# Patient Record
Sex: FEMALE | Race: WHITE | NOT HISPANIC OR LATINO | URBAN - METROPOLITAN AREA
[De-identification: names, ages, dates, MRNs, and addresses within clinical notes are randomized per-mention and may not be internally consistent; named-entity substitution may affect disease eponyms.]

---

## 2023-08-14 ENCOUNTER — INPATIENT (INPATIENT)
Facility: HOSPITAL | Age: 83
LOS: 3 days | Discharge: HOME HEALTH | DRG: 177 | End: 2023-08-18
Attending: STUDENT IN AN ORGANIZED HEALTH CARE EDUCATION/TRAINING PROGRAM | Admitting: STUDENT IN AN ORGANIZED HEALTH CARE EDUCATION/TRAINING PROGRAM
Payer: MEDICARE

## 2023-08-14 VITALS
SYSTOLIC BLOOD PRESSURE: 94 MMHG | OXYGEN SATURATION: 86 % | RESPIRATION RATE: 19 BRPM | TEMPERATURE: 98 F | DIASTOLIC BLOOD PRESSURE: 69 MMHG | WEIGHT: 125 LBS | HEIGHT: 60 IN | HEART RATE: 100 BPM

## 2023-08-14 LAB
ALBUMIN SERPL ELPH-MCNC: 3.3 G/DL — LOW (ref 3.4–5)
ALP SERPL-CCNC: 51 U/L — SIGNIFICANT CHANGE UP (ref 40–120)
ALT FLD-CCNC: 14 U/L — SIGNIFICANT CHANGE UP (ref 12–42)
ANION GAP SERPL CALC-SCNC: 13 MMOL/L — SIGNIFICANT CHANGE UP (ref 9–16)
APPEARANCE UR: ABNORMAL
APTT BLD: 34 SEC — SIGNIFICANT CHANGE UP (ref 24.5–35.6)
AST SERPL-CCNC: 22 U/L — SIGNIFICANT CHANGE UP (ref 15–37)
BASOPHILS # BLD AUTO: 0.02 K/UL — SIGNIFICANT CHANGE UP (ref 0–0.2)
BASOPHILS NFR BLD AUTO: 0.4 % — SIGNIFICANT CHANGE UP (ref 0–2)
BILIRUB SERPL-MCNC: 0.3 MG/DL — SIGNIFICANT CHANGE UP (ref 0.2–1.2)
BILIRUB UR-MCNC: NEGATIVE — SIGNIFICANT CHANGE UP
BUN SERPL-MCNC: 14 MG/DL — SIGNIFICANT CHANGE UP (ref 7–23)
CALCIUM SERPL-MCNC: 9.3 MG/DL — SIGNIFICANT CHANGE UP (ref 8.5–10.5)
CHLORIDE SERPL-SCNC: 101 MMOL/L — SIGNIFICANT CHANGE UP (ref 96–108)
CO2 SERPL-SCNC: 22 MMOL/L — SIGNIFICANT CHANGE UP (ref 22–31)
COLOR SPEC: YELLOW — SIGNIFICANT CHANGE UP
CREAT SERPL-MCNC: 1.1 MG/DL — SIGNIFICANT CHANGE UP (ref 0.5–1.3)
DIFF PNL FLD: ABNORMAL
EGFR: 50 ML/MIN/1.73M2 — LOW
EOSINOPHIL # BLD AUTO: 0 K/UL — SIGNIFICANT CHANGE UP (ref 0–0.5)
EOSINOPHIL NFR BLD AUTO: 0 % — SIGNIFICANT CHANGE UP (ref 0–6)
GLUCOSE SERPL-MCNC: 114 MG/DL — HIGH (ref 70–99)
GLUCOSE UR QL: NEGATIVE MG/DL — SIGNIFICANT CHANGE UP
HCT VFR BLD CALC: 34.3 % — LOW (ref 34.5–45)
HGB BLD-MCNC: 9.7 G/DL — LOW (ref 11.5–15.5)
IMM GRANULOCYTES NFR BLD AUTO: 0.6 % — SIGNIFICANT CHANGE UP (ref 0–0.9)
INR BLD: 1.14 — SIGNIFICANT CHANGE UP (ref 0.85–1.18)
KETONES UR-MCNC: ABNORMAL MG/DL
LACTATE BLDV-MCNC: 1.1 MMOL/L — SIGNIFICANT CHANGE UP (ref 0.5–2)
LACTATE BLDV-MCNC: 2.1 MMOL/L — HIGH (ref 0.5–2)
LEUKOCYTE ESTERASE UR-ACNC: ABNORMAL
LYMPHOCYTES # BLD AUTO: 0.49 K/UL — LOW (ref 1–3.3)
LYMPHOCYTES # BLD AUTO: 9.8 % — LOW (ref 13–44)
MCHC RBC-ENTMCNC: 21 PG — LOW (ref 27–34)
MCHC RBC-ENTMCNC: 28.3 GM/DL — LOW (ref 32–36)
MCV RBC AUTO: 74.1 FL — LOW (ref 80–100)
MONOCYTES # BLD AUTO: 0.77 K/UL — SIGNIFICANT CHANGE UP (ref 0–0.9)
MONOCYTES NFR BLD AUTO: 15.4 % — HIGH (ref 2–14)
NEUTROPHILS # BLD AUTO: 3.68 K/UL — SIGNIFICANT CHANGE UP (ref 1.8–7.4)
NEUTROPHILS NFR BLD AUTO: 73.8 % — SIGNIFICANT CHANGE UP (ref 43–77)
NITRITE UR-MCNC: NEGATIVE — SIGNIFICANT CHANGE UP
NRBC # BLD: 0 /100 WBCS — SIGNIFICANT CHANGE UP (ref 0–0)
NT-PROBNP SERPL-SCNC: 525 PG/ML — HIGH
PCO2 BLDV: 42 MMHG — SIGNIFICANT CHANGE UP (ref 39–42)
PH BLDV: 7.36 — SIGNIFICANT CHANGE UP (ref 7.32–7.43)
PH UR: 6 — SIGNIFICANT CHANGE UP (ref 5–8)
PLATELET # BLD AUTO: 291 K/UL — SIGNIFICANT CHANGE UP (ref 150–400)
PO2 BLDV: <35 MMHG — SIGNIFICANT CHANGE UP (ref 25–45)
POTASSIUM SERPL-MCNC: 3.4 MMOL/L — LOW (ref 3.5–5.3)
POTASSIUM SERPL-SCNC: 3.4 MMOL/L — LOW (ref 3.5–5.3)
PROT SERPL-MCNC: 7.9 G/DL — SIGNIFICANT CHANGE UP (ref 6.4–8.2)
PROT UR-MCNC: 30 MG/DL
PROTHROM AB SERPL-ACNC: 12.5 SEC — SIGNIFICANT CHANGE UP (ref 9.5–13)
RBC # BLD: 4.63 M/UL — SIGNIFICANT CHANGE UP (ref 3.8–5.2)
RBC # FLD: 17.3 % — HIGH (ref 10.3–14.5)
SAO2 % BLDV: 48.6 % — LOW (ref 67–88)
SARS-COV-2 RNA SPEC QL NAA+PROBE: DETECTED
SODIUM SERPL-SCNC: 136 MMOL/L — SIGNIFICANT CHANGE UP (ref 132–145)
SP GR SPEC: 1.01 — SIGNIFICANT CHANGE UP (ref 1–1.03)
TROPONIN I, HIGH SENSITIVITY RESULT: 10.3 NG/L — SIGNIFICANT CHANGE UP
UROBILINOGEN FLD QL: 1 MG/DL — SIGNIFICANT CHANGE UP (ref 0.2–1)
WBC # BLD: 4.99 K/UL — SIGNIFICANT CHANGE UP (ref 3.8–10.5)
WBC # FLD AUTO: 4.99 K/UL — SIGNIFICANT CHANGE UP (ref 3.8–10.5)

## 2023-08-14 PROCEDURE — 99285 EMERGENCY DEPT VISIT HI MDM: CPT

## 2023-08-14 PROCEDURE — 71045 X-RAY EXAM CHEST 1 VIEW: CPT | Mod: 26

## 2023-08-14 RX ORDER — DEXAMETHASONE 0.5 MG/5ML
6 ELIXIR ORAL ONCE
Refills: 0 | Status: COMPLETED | OUTPATIENT
Start: 2023-08-14 | End: 2023-08-14

## 2023-08-14 RX ORDER — ACETAMINOPHEN 500 MG
975 TABLET ORAL ONCE
Refills: 0 | Status: COMPLETED | OUTPATIENT
Start: 2023-08-14 | End: 2023-08-14

## 2023-08-14 RX ORDER — SODIUM CHLORIDE 9 MG/ML
1750 INJECTION INTRAMUSCULAR; INTRAVENOUS; SUBCUTANEOUS ONCE
Refills: 0 | Status: COMPLETED | OUTPATIENT
Start: 2023-08-14 | End: 2023-08-14

## 2023-08-14 RX ADMIN — SODIUM CHLORIDE 1750 MILLILITER(S): 9 INJECTION INTRAMUSCULAR; INTRAVENOUS; SUBCUTANEOUS at 19:59

## 2023-08-14 RX ADMIN — Medication 975 MILLIGRAM(S): at 19:59

## 2023-08-14 RX ADMIN — Medication 100 MILLIGRAM(S): at 20:32

## 2023-08-14 RX ADMIN — Medication 6 MILLIGRAM(S): at 22:24

## 2023-08-14 NOTE — ED ADULT NURSE REASSESSMENT NOTE - NS ED NURSE REASSESS COMMENT FT1
While at the bedside assisting Pt to bedside commode I observed cardiac monitor showing -170 lasting appx 30-45 seconds, then againg -190 lasting appx 30-45 seconds resolving to SR 86-90. MD made aware and at the bedside. Pt denies any complaints during episodes.

## 2023-08-14 NOTE — ED PROVIDER NOTE - OBJECTIVE STATEMENT
81 y/o F w/hx CVA (residual gait disturbance and LLE weakness, ambulates with walker), HTN, HLD, hypothyroidism, p/w cough, severe fatigue/malaise and poor appetite/reduced PO intake for 3 days with night sweats, no documented fever. Cough is productive with some thick sputum. Denies CP/SOB. No abd pain. Normal stooling. No urinary sx. + home COVID test today and + known contact of son. Pt is unclear whether she's been vaccinated against COVID but has had COVID 1x prior, which pt states was a mild case and did not require hospitalization. No hx lung disease, inhaler use, smoking, or prior O2 requirement. Denies LE pain/swelling.

## 2023-08-14 NOTE — ED PROVIDER NOTE - CARE PLAN
1 Principal Discharge DX:	Acute respiratory failure, unspecified whether with hypoxia or hypercapnia  Secondary Diagnosis:	2019 novel coronavirus disease (COVID-19)   Principal Discharge DX:	Acute respiratory failure, unspecified whether with hypoxia or hypercapnia  Secondary Diagnosis:	2019 novel coronavirus disease (COVID-19)  Secondary Diagnosis:	Supraventricular arrhythmia

## 2023-08-14 NOTE — ED PROVIDER NOTE - CLINICAL SUMMARY MEDICAL DECISION MAKING FREE TEXT BOX
+ COVID w/hypoxic respiratory failure, responding well to 3L O2 via NC, not working for breath, lactate downtrending, admitting to [facility] at [facility]. + COVID w/hypoxic respiratory failure, responding well to 3L O2 via NC, not working for breath, lactate downtrending, admitting to RM[facility] at [facility].    ** After admission to [facility], pt developed a tachy narrow-complex arrhythmia with a rate in the 170s for approximately 1 minute that broke with pt bearing down. Unable to capture on EKG. Repeat EKG shows pt back in normal sinus rhythm. Pt has no known hx of arrhythmia or cardiovascular disease beyond HTN. However, pt does indicate that from time to time she will get a strange chest discomfort (often at rest) with a feeling of lightheadedness that will last for 1-2min for approximately the last 6 months. Discussed with both hospitalist and intensivist at [facility], dispo changed to med-tele.

## 2023-08-14 NOTE — ED ADULT NURSE NOTE - NSFALLRISKINTERV_ED_ALL_ED

## 2023-08-14 NOTE — ED ADULT NURSE NOTE - OBJECTIVE STATEMENT
Pt with at home covid test, reports decreased PO intake x3-4 days and inability to take home meds. Pt with productive cough and SOB. Pt placed on 2L NC for hypoxia, reports this is her second time with covid and denies any vaccinations. Placed on all monitors.

## 2023-08-14 NOTE — ED PROVIDER NOTE - PHYSICAL EXAMINATION
VITAL SIGNS: I have reviewed nursing notes and confirm.  CONSTITUTIONAL: thin elderly female lying calmly in stretcher on 3L O2 via NC, speaking clearly in complete sentences, in no acute distress.  SKIN: Skin exam is warm and dry, no acute rash.  HEAD: Normocephalic; atraumatic.  EYES: PERRL, EOM intact; conjunctiva and sclera clear.  ENT: No nasal discharge; airway clear.  NECK: Supple; non tender.  CARD: S1, S2 normal; no murmurs, gallops, or rubs. Regular rate and rhythm.  RESP: Unlabored. + scattered rhonchi that clear with cough, no wheezing, good excursion  ABD: soft; non-distended; non-tender  EXT: Normal ROM. No cyanosis or edema. Non-ttp all ext, distal pulses intact  NEURO: Alert, oriented. Grossly unremarkable.  PSYCH: Cooperative, appropriate.

## 2023-08-15 DIAGNOSIS — D50.9 IRON DEFICIENCY ANEMIA, UNSPECIFIED: ICD-10-CM

## 2023-08-15 DIAGNOSIS — J96.01 ACUTE RESPIRATORY FAILURE WITH HYPOXIA: ICD-10-CM

## 2023-08-15 DIAGNOSIS — U07.1 COVID-19: ICD-10-CM

## 2023-08-15 DIAGNOSIS — I47.1 SUPRAVENTRICULAR TACHYCARDIA: ICD-10-CM

## 2023-08-15 DIAGNOSIS — R82.71 BACTERIURIA: ICD-10-CM

## 2023-08-15 DIAGNOSIS — I10 ESSENTIAL (PRIMARY) HYPERTENSION: ICD-10-CM

## 2023-08-15 DIAGNOSIS — E03.9 HYPOTHYROIDISM, UNSPECIFIED: ICD-10-CM

## 2023-08-15 DIAGNOSIS — N39.0 URINARY TRACT INFECTION, SITE NOT SPECIFIED: ICD-10-CM

## 2023-08-15 DIAGNOSIS — E78.5 HYPERLIPIDEMIA, UNSPECIFIED: ICD-10-CM

## 2023-08-15 DIAGNOSIS — Z98.890 OTHER SPECIFIED POSTPROCEDURAL STATES: Chronic | ICD-10-CM

## 2023-08-15 DIAGNOSIS — Z29.9 ENCOUNTER FOR PROPHYLACTIC MEASURES, UNSPECIFIED: ICD-10-CM

## 2023-08-15 DIAGNOSIS — I63.9 CEREBRAL INFARCTION, UNSPECIFIED: ICD-10-CM

## 2023-08-15 DIAGNOSIS — Z90.710 ACQUIRED ABSENCE OF BOTH CERVIX AND UTERUS: Chronic | ICD-10-CM

## 2023-08-15 DIAGNOSIS — R10.9 UNSPECIFIED ABDOMINAL PAIN: ICD-10-CM

## 2023-08-15 DIAGNOSIS — M81.0 AGE-RELATED OSTEOPOROSIS WITHOUT CURRENT PATHOLOGICAL FRACTURE: ICD-10-CM

## 2023-08-15 LAB
ALBUMIN SERPL ELPH-MCNC: 3.3 G/DL — SIGNIFICANT CHANGE UP (ref 3.3–5)
ALP SERPL-CCNC: 50 U/L — SIGNIFICANT CHANGE UP (ref 40–120)
ALT FLD-CCNC: 9 U/L — LOW (ref 10–45)
ANION GAP SERPL CALC-SCNC: 10 MMOL/L — SIGNIFICANT CHANGE UP (ref 5–17)
AST SERPL-CCNC: 14 U/L — SIGNIFICANT CHANGE UP (ref 10–40)
BILIRUB SERPL-MCNC: 0.2 MG/DL — SIGNIFICANT CHANGE UP (ref 0.2–1.2)
BUN SERPL-MCNC: 18 MG/DL — SIGNIFICANT CHANGE UP (ref 7–23)
CALCIUM SERPL-MCNC: 9.2 MG/DL — SIGNIFICANT CHANGE UP (ref 8.4–10.5)
CHLORIDE SERPL-SCNC: 109 MMOL/L — HIGH (ref 96–108)
CO2 SERPL-SCNC: 21 MMOL/L — LOW (ref 22–31)
CREAT SERPL-MCNC: 0.61 MG/DL — SIGNIFICANT CHANGE UP (ref 0.5–1.3)
EGFR: 88 ML/MIN/1.73M2 — SIGNIFICANT CHANGE UP
FERRITIN SERPL-MCNC: 14 NG/ML — SIGNIFICANT CHANGE UP (ref 13–330)
GLUCOSE SERPL-MCNC: 127 MG/DL — HIGH (ref 70–99)
HCT VFR BLD CALC: 33.1 % — LOW (ref 34.5–45)
HGB BLD-MCNC: 9.5 G/DL — LOW (ref 11.5–15.5)
IRON SATN MFR SERPL: 17 UG/DL — LOW (ref 30–160)
IRON SATN MFR SERPL: 6 % — LOW (ref 14–50)
MAGNESIUM SERPL-MCNC: 2 MG/DL — SIGNIFICANT CHANGE UP (ref 1.6–2.6)
MCHC RBC-ENTMCNC: 21.4 PG — LOW (ref 27–34)
MCHC RBC-ENTMCNC: 28.7 GM/DL — LOW (ref 32–36)
MCV RBC AUTO: 74.7 FL — LOW (ref 80–100)
NRBC # BLD: 0 /100 WBCS — SIGNIFICANT CHANGE UP (ref 0–0)
PHOSPHATE SERPL-MCNC: 3 MG/DL — SIGNIFICANT CHANGE UP (ref 2.5–4.5)
PLATELET # BLD AUTO: 291 K/UL — SIGNIFICANT CHANGE UP (ref 150–400)
POTASSIUM SERPL-MCNC: 3.3 MMOL/L — LOW (ref 3.5–5.3)
POTASSIUM SERPL-SCNC: 3.3 MMOL/L — LOW (ref 3.5–5.3)
PROT SERPL-MCNC: 6.3 G/DL — SIGNIFICANT CHANGE UP (ref 6–8.3)
RBC # BLD: 4.43 M/UL — SIGNIFICANT CHANGE UP (ref 3.8–5.2)
RBC # FLD: 17.3 % — HIGH (ref 10.3–14.5)
SODIUM SERPL-SCNC: 140 MMOL/L — SIGNIFICANT CHANGE UP (ref 135–145)
TIBC SERPL-MCNC: 272 UG/DL — SIGNIFICANT CHANGE UP (ref 220–430)
TRANSFERRIN SERPL-MCNC: 225 MG/DL — SIGNIFICANT CHANGE UP (ref 200–360)
UIBC SERPL-MCNC: 255 UG/DL — SIGNIFICANT CHANGE UP (ref 110–370)
WBC # BLD: 3.64 K/UL — LOW (ref 3.8–10.5)
WBC # FLD AUTO: 3.64 K/UL — LOW (ref 3.8–10.5)

## 2023-08-15 PROCEDURE — 99233 SBSQ HOSP IP/OBS HIGH 50: CPT | Mod: GC

## 2023-08-15 RX ORDER — ENOXAPARIN SODIUM 100 MG/ML
40 INJECTION SUBCUTANEOUS EVERY 24 HOURS
Refills: 0 | Status: DISCONTINUED | OUTPATIENT
Start: 2023-08-15 | End: 2023-08-18

## 2023-08-15 RX ORDER — ASPIRIN/CALCIUM CARB/MAGNESIUM 324 MG
81 TABLET ORAL DAILY
Refills: 0 | Status: DISCONTINUED | OUTPATIENT
Start: 2023-08-15 | End: 2023-08-18

## 2023-08-15 RX ORDER — LISINOPRIL 2.5 MG/1
1 TABLET ORAL
Refills: 0 | DISCHARGE

## 2023-08-15 RX ORDER — LEVOTHYROXINE SODIUM 125 MCG
88 TABLET ORAL EVERY 24 HOURS
Refills: 0 | Status: DISCONTINUED | OUTPATIENT
Start: 2023-08-15 | End: 2023-08-18

## 2023-08-15 RX ORDER — SODIUM CHLORIDE 9 MG/ML
1000 INJECTION, SOLUTION INTRAVENOUS
Refills: 0 | Status: DISCONTINUED | OUTPATIENT
Start: 2023-08-15 | End: 2023-08-18

## 2023-08-15 RX ORDER — FAMOTIDINE 10 MG/ML
1 INJECTION INTRAVENOUS
Refills: 0 | DISCHARGE

## 2023-08-15 RX ORDER — CEFTRIAXONE 500 MG/1
1000 INJECTION, POWDER, FOR SOLUTION INTRAMUSCULAR; INTRAVENOUS EVERY 24 HOURS
Refills: 0 | Status: COMPLETED | OUTPATIENT
Start: 2023-08-15 | End: 2023-08-17

## 2023-08-15 RX ORDER — RALOXIFENE HYDROCHLORIDE 60 MG/1
1 TABLET, COATED ORAL
Refills: 0 | DISCHARGE

## 2023-08-15 RX ORDER — LISINOPRIL 2.5 MG/1
5 TABLET ORAL DAILY
Refills: 0 | Status: DISCONTINUED | OUTPATIENT
Start: 2023-08-15 | End: 2023-08-15

## 2023-08-15 RX ORDER — DEXTROSE 50 % IN WATER 50 %
15 SYRINGE (ML) INTRAVENOUS ONCE
Refills: 0 | Status: DISCONTINUED | OUTPATIENT
Start: 2023-08-15 | End: 2023-08-18

## 2023-08-15 RX ORDER — ROSUVASTATIN CALCIUM 5 MG/1
1 TABLET ORAL
Refills: 0 | DISCHARGE

## 2023-08-15 RX ORDER — DEXTROSE 50 % IN WATER 50 %
25 SYRINGE (ML) INTRAVENOUS ONCE
Refills: 0 | Status: DISCONTINUED | OUTPATIENT
Start: 2023-08-15 | End: 2023-08-18

## 2023-08-15 RX ORDER — AMITRIPTYLINE HCL 25 MG
1 TABLET ORAL
Refills: 0 | DISCHARGE

## 2023-08-15 RX ORDER — LISINOPRIL 2.5 MG/1
5 TABLET ORAL EVERY 24 HOURS
Refills: 0 | Status: DISCONTINUED | OUTPATIENT
Start: 2023-08-15 | End: 2023-08-18

## 2023-08-15 RX ORDER — FAMOTIDINE 10 MG/ML
20 INJECTION INTRAVENOUS DAILY
Refills: 0 | Status: DISCONTINUED | OUTPATIENT
Start: 2023-08-15 | End: 2023-08-18

## 2023-08-15 RX ORDER — GLUCAGON INJECTION, SOLUTION 0.5 MG/.1ML
1 INJECTION, SOLUTION SUBCUTANEOUS ONCE
Refills: 0 | Status: DISCONTINUED | OUTPATIENT
Start: 2023-08-15 | End: 2023-08-18

## 2023-08-15 RX ORDER — DEXAMETHASONE 0.5 MG/5ML
6 ELIXIR ORAL DAILY
Refills: 0 | Status: DISCONTINUED | OUTPATIENT
Start: 2023-08-15 | End: 2023-08-17

## 2023-08-15 RX ORDER — ASPIRIN/CALCIUM CARB/MAGNESIUM 324 MG
1 TABLET ORAL
Refills: 0 | DISCHARGE

## 2023-08-15 RX ORDER — DEXTROSE 50 % IN WATER 50 %
12.5 SYRINGE (ML) INTRAVENOUS ONCE
Refills: 0 | Status: DISCONTINUED | OUTPATIENT
Start: 2023-08-15 | End: 2023-08-18

## 2023-08-15 RX ORDER — REMDESIVIR 5 MG/ML
100 INJECTION INTRAVENOUS EVERY 24 HOURS
Refills: 0 | Status: DISCONTINUED | OUTPATIENT
Start: 2023-08-16 | End: 2023-08-18

## 2023-08-15 RX ORDER — INSULIN LISPRO 100/ML
VIAL (ML) SUBCUTANEOUS
Refills: 0 | Status: DISCONTINUED | OUTPATIENT
Start: 2023-08-15 | End: 2023-08-18

## 2023-08-15 RX ORDER — LEVOTHYROXINE SODIUM 125 MCG
1 TABLET ORAL
Refills: 0 | DISCHARGE

## 2023-08-15 RX ORDER — REMDESIVIR 5 MG/ML
200 INJECTION INTRAVENOUS EVERY 24 HOURS
Refills: 0 | Status: COMPLETED | OUTPATIENT
Start: 2023-08-15 | End: 2023-08-15

## 2023-08-15 RX ORDER — ATORVASTATIN CALCIUM 80 MG/1
20 TABLET, FILM COATED ORAL AT BEDTIME
Refills: 0 | Status: DISCONTINUED | OUTPATIENT
Start: 2023-08-15 | End: 2023-08-18

## 2023-08-15 RX ORDER — REMDESIVIR 5 MG/ML
INJECTION INTRAVENOUS
Refills: 0 | Status: DISCONTINUED | OUTPATIENT
Start: 2023-08-16 | End: 2023-08-18

## 2023-08-15 RX ADMIN — CEFTRIAXONE 100 MILLIGRAM(S): 500 INJECTION, POWDER, FOR SOLUTION INTRAMUSCULAR; INTRAVENOUS at 18:57

## 2023-08-15 RX ADMIN — Medication 88 MICROGRAM(S): at 10:26

## 2023-08-15 RX ADMIN — ENOXAPARIN SODIUM 40 MILLIGRAM(S): 100 INJECTION SUBCUTANEOUS at 10:27

## 2023-08-15 RX ADMIN — FAMOTIDINE 20 MILLIGRAM(S): 10 INJECTION INTRAVENOUS at 10:26

## 2023-08-15 RX ADMIN — LISINOPRIL 5 MILLIGRAM(S): 2.5 TABLET ORAL at 10:26

## 2023-08-15 RX ADMIN — REMDESIVIR 200 MILLIGRAM(S): 5 INJECTION INTRAVENOUS at 17:17

## 2023-08-15 RX ADMIN — ATORVASTATIN CALCIUM 20 MILLIGRAM(S): 80 TABLET, FILM COATED ORAL at 21:05

## 2023-08-15 RX ADMIN — Medication 81 MILLIGRAM(S): at 10:26

## 2023-08-15 RX ADMIN — Medication 6 MILLIGRAM(S): at 21:03

## 2023-08-15 NOTE — H&P ADULT - HISTORY OF PRESENT ILLNESS
HPI:    Pt is a 81 y/o F w/hx CVA (residual gait disturbance and LLE weakness, ambulates with walker), HTN, HLD, hypothyroidism, p/w cough, severe fatigue/malaise and poor appetite/reduced PO intake for 3 days with night sweats, no documented fever. Cough is productive with some thick sputum. Denies CP/SOB. No abd pain. Normal stooling. No urinary sx. + home COVID test today and + known contact of son. Pt is unclear whether she's been vaccinated against COVID but has had COVID 1x prior, which pt states was a mild case and did not require hospitalization. No hx lung disease, inhaler use, smoking, or prior O2 requirement. Denies LE pain/swelling.  While in the ED, pt developed a tachy narrow-complex arrhythmia with a rate in the 170s for approximately 1 minute that broke with pt bearing down. Unable to capture on EKG. Repeat EKG shows pt back in normal sinus rhythm. Pt has no known hx of arrhythmia or cardiovascular disease beyond HTN. However, pt does indicate that from time to time she will get a strange chest discomfort (often at rest) with a feeling of lightheadedness that will last for 1-2min for approximately the last 6 months.    In the ED:  vitals: T 98.4, , BP 94/69, RR 19, O2 86% on RA  labs: Pro-, lactate 2.1  UA positive  COVID positive  Imaging: EKG: tachy narrow complex arrhythmia with rate in the 170s for approximately 1 minute that broke with pt doing valsava manuver. Repeat EKG showing NSR  CXR: Left basilar focal atelectasis  Intervention: Acetaminophen 975mg, tessalon perle 100mg, dexamethasone 6mg, 1.75L NS     HPI:    Pt is a 81 y/o F w/hx CVA (2015 w/ residual gait disturbance and LLE weakness, ambulates with walker), HTN, HLD, hypothyroidism, p/w cough, severe fatigue/malaise and poor appetite/reduced PO intake for 3 days with night sweats, no documented fever. Cough is productive with some thick sputum. Denies CP/SOB. No abd pain. Normal stooling. No urinary sx. + home COVID test today and + known contact of son. Pt is unclear whether she's been vaccinated against COVID but has had COVID 1x prior, which pt states was a mild case and did not require hospitalization. No hx lung disease, inhaler use, smoking, or prior O2 requirement. Denies LE pain/swelling.  While in the ED, pt developed a tachy narrow-complex arrhythmia with a rate in the 170s for approximately 1 minute that broke with pt bearing down. Unable to capture on EKG. Repeat EKG shows pt back in normal sinus rhythm. Pt has no known hx of arrhythmia or cardiovascular disease beyond HTN. However, pt does indicate that from time to time she will get a strange chest discomfort (often at rest) with a feeling of lightheadedness that will last for 1-2min for approximately the last 6 months.    In the ED:  vitals: T 98.4, , BP 94/69, RR 19, O2 86% on RA  labs: Pro-, lactate 2.1  UA positive  COVID positive  Imaging: EKG: tachy narrow complex arrhythmia with rate in the 170s for approximately 1 minute that broke with pt doing valsava manuver. Repeat EKG showing NSR  CXR: Left basilar focal atelectasis  Intervention: Acetaminophen 975mg, tessalon perle 100mg, dexamethasone 6mg, 1.75L NS

## 2023-08-15 NOTE — PROGRESS NOTE ADULT - PROBLEM SELECTOR PLAN 9
H/o hypothyroidism. Home meds: Levothyroxine 88mg qd  - c/w home med H/o hypothyroidism. Home meds: Levothyroxine 88mg qd    Plan  - c/w home med

## 2023-08-15 NOTE — PROGRESS NOTE ADULT - PROBLEM SELECTOR PLAN 7
h/o HTN. Home meds: lisinopril 5mg qd  - c/w home med h/o HTN. Home meds: lisinopril 5mg qd    Plan  - c/w home med

## 2023-08-15 NOTE — H&P ADULT - PROBLEM SELECTOR PLAN 11
Fluids: s/p 1.75 L  Electrolytes: Mg >2, K >4  Nutrition: consistent carb diet  Prophylaxis: lovenox  Activity: fall risk  GI: none  C: FC  Dispo: 7lach

## 2023-08-15 NOTE — PROCEDURE NOTE - NSINFORMCONSENT_GEN_A_CORE
Benefits, risks, and possible complications of procedure explained to patient/caregiver who verbalized understanding and gave verbal consent. Niacinamide Counseling: I recommended taking niacin or niacinamide, also know as vitamin B3, twice daily. Recent evidence suggests that taking vitamin B3 (500 mg twice daily) can reduce the risk of actinic keratoses and non-melanoma skin cancers. Side effects of vitamin B3 include flushing and headache.

## 2023-08-15 NOTE — H&P ADULT - PROBLEM SELECTOR PLAN 4
Hgb 9.7 on admission. unknown baseline.  - f/u iron panel  - transfuse if <7  - active T&S UA positive, leukocyte esterase, WBC, and bacteria. Pt asymptomatic,  denies dysuria or increase in urinary frequency.  - continue to monitor

## 2023-08-15 NOTE — H&P ADULT - PROBLEM SELECTOR PLAN 10
Fluids: s/p 1.75 L  Electrolytes: Mg >2, K >4  Nutrition:   Prophylaxis: lovenox  Activity:  GI: none  C:   Dispo: Pt has h/o six abdominal surgeries including hysterectomy. She reports chronic pain for which she takes Amitriptyline 25mg  - f/u EKG and consider restarting if QTc normal and pt has abdominal pain

## 2023-08-15 NOTE — H&P ADULT - PROBLEM SELECTOR PLAN 5
UA positive, leukocyte esterase, WBC, and bacteria. Pt c/o UA positive, leukocyte esterase, WBC, and bacteria. Pt asymptomatic,  denies dysuria or increase in urinary frequency.  - continue to monitor Hgb 9.7 on admission. unknown baseline.  - f/u iron panel  - transfuse if <7  - active T&S

## 2023-08-15 NOTE — PHYSICAL THERAPY INITIAL EVALUATION ADULT - ADDITIONAL COMMENTS
As per pt, PTA she was mostly independent with functional mobility, ADLs, and IADLs with use of rollator. Pt lives with son and he cooks/cleans for her. Son accompanies pt to all appointments. Pt was previously doing OPPT for LLE residual CVA deficits (CVA 6 years ago). Has a shower tub with a shower chair and grab bars.

## 2023-08-15 NOTE — PROGRESS NOTE ADULT - PROBLEM SELECTOR PLAN 5
gb 9.7 on admission. Unknown baseline.  -    Plan  - f/u iron panel  - transfuse if <7  - active T&S Hgb 9.7 on admission. Unknown baseline.    Plan  - f/u iron panel- likely DEANNA   - transfuse if <7  - active T&S

## 2023-08-15 NOTE — H&P ADULT - PROBLEM SELECTOR PLAN 6
Pt with residual gait disturbance and LLE weakness, ambulates with walker. Home meds:  - c/w home med Pt with residual gait disturbance and LLE weakness, ambulates with walker. Home meds: Rosuvastain 20 and aspirin 81  - c/w home med

## 2023-08-15 NOTE — PROGRESS NOTE ADULT - PROBLEM SELECTOR PLAN 10
Pt has h/o six abdominal surgeries including hysterectomy. She reports chronic pain for which she takes Amitriptyline 25mg  - f/u EKG and consider restarting if QTc normal and pt has abdominal pain Pt has h/o six abdominal surgeries including hysterectomy. She reports chronic pain for which she takes Amitriptyline 25mg  Pt. currently not complaining of any abdominal pain     Plan  - f/u EKG and consider restarting if QTc normal and pt has abdominal pain

## 2023-08-15 NOTE — H&P ADULT - NSHPPHYSICALEXAM_GEN_ALL_CORE
GENERAL: NAD, lying in bed comfortably  HEAD:  Atraumatic, normocephalic  EYES: EOMI, PERRLA, conjunctiva and sclera clear  NECK: Supple, trachea midline, no JVD  HEART: Regular rate and rhythm, no murmurs, rubs, or gallops  LUNGS: Unlabored respirations.  Clear to auscultation bilaterally, no crackles, wheezing, or rhonchi  ABDOMEN: Soft, nontender, nondistended, +BS  EXTREMITIES: 2+ peripheral pulses bilaterally. No clubbing, cyanosis, or edema  NERVOUS SYSTEM:  A&Ox3, moving all extremities, no focal deficits   SKIN: No rashes or lesions

## 2023-08-15 NOTE — H&P ADULT - NSHPLABSRESULTS_GEN_ALL_CORE
LABS:                         9.7    4.99  )-----------( 291      ( 14 Aug 2023 19:38 )             34.3     08-14    136  |  101  |  14  ----------------------------<  114<H>  3.4<L>   |  22  |  1.10    Ca    9.3      14 Aug 2023 19:38    TPro  7.9  /  Alb  3.3<L>  /  TBili  0.3  /  DBili  x   /  AST  22  /  ALT  14  /  AlkPhos  51  08-14    PT/INR - ( 14 Aug 2023 19:38 )   PT: 12.5 sec;   INR: 1.14          PTT - ( 14 Aug 2023 19:38 )  PTT:34.0 sec  Urinalysis Basic - ( 14 Aug 2023 19:38 )    Color: Yellow / Appearance: Cloudy / S.010 / pH: x  Gluc: 114 mg/dL / Ketone: Trace mg/dL  / Bili: Negative / Urobili: 1.0 mg/dL   Blood: x / Protein: 30 mg/dL / Nitrite: Negative   Leuk Esterase: Large / RBC: 3 /HPF / WBC >30 /HPF   Sq Epi: x / Non Sq Epi: x / Bacteria: Too Numerous to count /HPF                RADIOLOGY, EKG & ADDITIONAL TESTS: Reviewed.

## 2023-08-15 NOTE — PROGRESS NOTE ADULT - PROBLEM SELECTOR PLAN 3
EKG in ED showing tachy narrow complex arrhythmia with rate in the 170s for approximately 1 minute that broke with pt doing valsava manuver. Repeat EKG showing NSR.  Possibly 2/2 COVID infection and hypoxia     Plan   - f/u TTE  - consider EP consult if patient experiences arrhythmia again

## 2023-08-15 NOTE — H&P ADULT - ASSESSMENT
Pt is a 81 y/o F w/hx CVA (residual gait disturbance and LLE weakness, ambulates with walker), HTN, HLD, hypothyroidism, p/w cough, severe fatigue/malaise likely 2/2 to COVID infection. Pt also developed a tachy narrow complex arrhythmia at ED which broke with valsava maneuver. Pt being admitted to [facility] for closer monitoring of arrythmia and COVID infection.

## 2023-08-15 NOTE — PATIENT PROFILE ADULT - FUNCTIONAL ASSESSMENT - DAILY ACTIVITY 4.
3 = A little assistance
Comment: Improvement s/p 4 months on Accutane with no adverse s/e. Plan to continue at current dose.
Detail Level: Zone

## 2023-08-15 NOTE — PROGRESS NOTE ADULT - PROBLEM SELECTOR PLAN 1
Patient had a + home COVID test and known sick contact of her COVID + son. Pt is unsure if she ever got COVID vaccination   Pt. SpO2 86% on RA upon presentation to ED, tested positive for COVID  CXR: Left basilar focal atelectasis    Plan   - c/w Decadron 6mg   - c/w Remdesevir taper  - On 4L NC, wean as tolerated  - supportive care

## 2023-08-15 NOTE — PROGRESS NOTE ADULT - SUBJECTIVE AND OBJECTIVE BOX
**INCOMPLETE NOTE    OVERNIGHT EVENTS:    SUBJECTIVE:  Patient seen and examined at bedside.    Vital Signs Last 12 Hrs  T(F): 98.3 (08-15-23 @ 05:31), Max: 98.5 (08-15-23 @ 04:12)  HR: 70 (08-15-23 @ 05:31) (67 - 88)  BP: 151/66 (08-15-23 @ 05:31) (111/56 - 161/66)  BP(mean): 95 (08-15-23 @ 05:31) (95 - 95)  RR: 20 (08-15-23 @ 05:31) (16 - 20)  SpO2: 96% (08-15-23 @ 05:31) (93% - 96%)  I&O's Summary      PHYSICAL EXAM:  Constitutional: NAD, comfortable in bed.  HEENT: NC/AT, PERRLA, EOMI, no conjunctival pallor or scleral icterus, MMM  Neck: Supple, no JVD  Respiratory: CTA B/L. No w/r/r.   Cardiovascular: RRR, normal S1 and S2, no m/r/g.   Gastrointestinal: +BS, soft NTND, no guarding or rebound tenderness, no palpable masses   Extremities: wwp; no cyanosis, clubbing or edema.   Vascular: Pulses equal and strong throughout.   Neurological: AAOx3, no CN deficits, strength and sensation intact throughout.   Skin: No gross skin abnormalities or rashes        LABS:                        9.7    4.99  )-----------( 291      ( 14 Aug 2023 19:38 )             34.3     08-    136  |  101  |  14  ----------------------------<  114<H>  3.4<L>   |  22  |  1.10    Ca    9.3      14 Aug 2023 19:38    TPro  7.9  /  Alb  3.3<L>  /  TBili  0.3  /  DBili  x   /  AST  22  /  ALT  14  /  AlkPhos  51  08-14    PT/INR - ( 14 Aug 2023 19:38 )   PT: 12.5 sec;   INR: 1.14          PTT - ( 14 Aug 2023 19:38 )  PTT:34.0 sec  Urinalysis Basic - ( 14 Aug 2023 19:38 )    Color: Yellow / Appearance: Cloudy / S.010 / pH: x  Gluc: 114 mg/dL / Ketone: Trace mg/dL  / Bili: Negative / Urobili: 1.0 mg/dL   Blood: x / Protein: 30 mg/dL / Nitrite: Negative   Leuk Esterase: Large / RBC: 3 /HPF / WBC >30 /HPF   Sq Epi: x / Non Sq Epi: x / Bacteria: Too Numerous to count /HPF          RADIOLOGY & ADDITIONAL TESTS:    MEDICATIONS  (STANDING):  aspirin  chewable 81 milliGRAM(s) Oral daily  atorvastatin 20 milliGRAM(s) Oral at bedtime  dexAMETHasone     Tablet 6 milliGRAM(s) Oral daily  enoxaparin Injectable 40 milliGRAM(s) SubCutaneous every 24 hours  famotidine    Tablet 20 milliGRAM(s) Oral daily  levothyroxine 88 MICROGram(s) Oral every 24 hours  lisinopril 5 milliGRAM(s) Oral every 24 hours    MEDICATIONS  (PRN):   ****** TRANSFER FROM Located within [facility] TO [facility] ******    HOSPITAL COURSE:  Pt is a 83 y/o F w/hx CVA (residual gait disturbance and LLE weakness, ambulates with walker), HTN, HLD, hypothyroidism, p/w cough, severe fatigue/malaise and found to be COVID positive. Initial vitals vitals: T 98.4, , BP 94/69, RR 19, O2 86% on RA. CXR showed Left basilar focal atelectasis. Patient initially placed on NC 2L but increased to 4L NC.   Pt was admitted to [facility] for closer monitoring of arrythmia and COVID infection. She was started on Decadron and Remdesivir for severe COVID.     Course wa c/b Pt also developed a tachy narrow complex arrhythmia at ED which broke with valsava maneuver.      SUBJECTIVE:  Patient seen and examined at bedside.    Vital Signs Last 12 Hrs  T(F): 98.3 (08-15-23 @ 05:31), Max: 98.5 (08-15-23 @ 04:12)  HR: 70 (08-15-23 @ 05:31) (67 - 88)  BP: 151/66 (08-15-23 @ 05:31) (111/56 - 161/66)  BP(mean): 95 (08-15-23 @ 05:31) (95 - 95)  RR: 20 (08-15-23 @ 05:31) (16 - 20)  SpO2: 96% (08-15-23 @ 05:31) (93% - 96%)  I&O's Summary      PHYSICAL EXAM:  Constitutional: NAD, comfortable in bed.  HEENT: NC/AT, PERRLA, EOMI, no conjunctival pallor or scleral icterus, MMM  Neck: Supple, no JVD  Respiratory: CTA B/L. No w/r/r.   Cardiovascular: RRR, normal S1 and S2, no m/r/g.   Gastrointestinal: +BS, soft NTND, no guarding or rebound tenderness, no palpable masses   Extremities: wwp; no cyanosis, clubbing or edema.   Vascular: Pulses equal and strong throughout.   Neurological: AAOx3, no CN deficits, strength and sensation intact throughout.   Skin: No gross skin abnormalities or rashes        LABS:                        9.7    4.99  )-----------( 291      ( 14 Aug 2023 19:38 )             34.3     08-14    136  |  101  |  14  ----------------------------<  114<H>  3.4<L>   |  22  |  1.10    Ca    9.3      14 Aug 2023 19:38    TPro  7.9  /  Alb  3.3<L>  /  TBili  0.3  /  DBili  x   /  AST  22  /  ALT  14  /  AlkPhos  51  08-14    PT/INR - ( 14 Aug 2023 19:38 )   PT: 12.5 sec;   INR: 1.14          PTT - ( 14 Aug 2023 19:38 )  PTT:34.0 sec  Urinalysis Basic - ( 14 Aug 2023 19:38 )    Color: Yellow / Appearance: Cloudy / S.010 / pH: x  Gluc: 114 mg/dL / Ketone: Trace mg/dL  / Bili: Negative / Urobili: 1.0 mg/dL   Blood: x / Protein: 30 mg/dL / Nitrite: Negative   Leuk Esterase: Large / RBC: 3 /HPF / WBC >30 /HPF   Sq Epi: x / Non Sq Epi: x / Bacteria: Too Numerous to count /HPF          RADIOLOGY & ADDITIONAL TESTS:    MEDICATIONS  (STANDING):  aspirin  chewable 81 milliGRAM(s) Oral daily  atorvastatin 20 milliGRAM(s) Oral at bedtime  dexAMETHasone     Tablet 6 milliGRAM(s) Oral daily  enoxaparin Injectable 40 milliGRAM(s) SubCutaneous every 24 hours  famotidine    Tablet 20 milliGRAM(s) Oral daily  levothyroxine 88 MICROGram(s) Oral every 24 hours  lisinopril 5 milliGRAM(s) Oral every 24 hours    MEDICATIONS  (PRN):   HOSPITAL COURSE:  Pt is a 81 y/o F w/hx CVA (residual gait disturbance and LLE weakness, ambulates with walker), HTN, HLD, hypothyroidism, p/w cough, severe fatigue/malaise and found to be COVID positive. Initial vitals vitals: T 98.4, , BP 94/69, RR 19, O2 86% on RA. CXR showed Left basilar focal atelectasis. Patient initially placed on NC 2L but increased to 4L NC.   Pt was admitted to [facility] for closer monitoring of arrythmia and COVID infection. She was started on Decadron and Remdesivir for severe COVID. Course was c/b a tachy narrow complex arrhythmia at ED which broke with valsava maneuver.      SUBJECTIVE:  Patient seen and examined at bedside.    Vital Signs Last 12 Hrs  T(F): 98.3 (08-15-23 @ 05:31), Max: 98.5 (08-15-23 @ 04:12)  HR: 70 (08-15-23 @ 05:31) (67 - 88)  BP: 151/66 (08-15-23 @ 05:31) (111/56 - 161/66)  BP(mean): 95 (08-15-23 @ 05:31) (95 - 95)  RR: 20 (08-15-23 @ 05:31) (16 - 20)  SpO2: 96% (08-15-23 @ 05:31) (93% - 96%)  I&O's Summary      PHYSICAL EXAM:  Constitutional: NAD, comfortable in bed.  HEENT: NC/AT, PERRLA, EOMI, no conjunctival pallor or scleral icterus, MMM  Neck: Supple, no JVD  Respiratory: CTA B/L. No w/r/r.   Cardiovascular: RRR, normal S1 and S2, no m/r/g.   Gastrointestinal: +BS, soft NTND, no guarding or rebound tenderness, no palpable masses   Extremities: wwp; no cyanosis, clubbing or edema.   Vascular: Pulses equal and strong throughout.   Neurological: AAOx3, no CN deficits, strength and sensation intact throughout.   Skin: No gross skin abnormalities or rashes        LABS:                        9.7    4.99  )-----------( 291      ( 14 Aug 2023 19:38 )             34.3     08-14    136  |  101  |  14  ----------------------------<  114<H>  3.4<L>   |  22  |  1.10    Ca    9.3      14 Aug 2023 19:38    TPro  7.9  /  Alb  3.3<L>  /  TBili  0.3  /  DBili  x   /  AST  22  /  ALT  14  /  AlkPhos  51  08-14    PT/INR - ( 14 Aug 2023 19:38 )   PT: 12.5 sec;   INR: 1.14          PTT - ( 14 Aug 2023 19:38 )  PTT:34.0 sec  Urinalysis Basic - ( 14 Aug 2023 19:38 )    Color: Yellow / Appearance: Cloudy / S.010 / pH: x  Gluc: 114 mg/dL / Ketone: Trace mg/dL  / Bili: Negative / Urobili: 1.0 mg/dL   Blood: x / Protein: 30 mg/dL / Nitrite: Negative   Leuk Esterase: Large / RBC: 3 /HPF / WBC >30 /HPF   Sq Epi: x / Non Sq Epi: x / Bacteria: Too Numerous to count /HPF          RADIOLOGY & ADDITIONAL TESTS:    MEDICATIONS  (STANDING):  aspirin  chewable 81 milliGRAM(s) Oral daily  atorvastatin 20 milliGRAM(s) Oral at bedtime  dexAMETHasone     Tablet 6 milliGRAM(s) Oral daily  enoxaparin Injectable 40 milliGRAM(s) SubCutaneous every 24 hours  famotidine    Tablet 20 milliGRAM(s) Oral daily  levothyroxine 88 MICROGram(s) Oral every 24 hours  lisinopril 5 milliGRAM(s) Oral every 24 hours    MEDICATIONS  (PRN):   HOSPITAL COURSE:  Pt is a 81 y/o F w/hx CVA (residual gait disturbance and LLE weakness, ambulates with walker), HTN, HLD, hypothyroidism, p/w cough, severe fatigue/malaise and found to be COVID positive. Initial vitals: T 98.4, , BP 94/69, RR 19, O2 86% on RA. CXR showed Left basilar focal atelectasis. Patient initially placed on NC 2L but increased to 4L NC. She was started on Decadron and Remdesivir for severe COVID. Course was c/b a tachy narrow complex arrhythmia at [facility] which broke with valsava maneuver.  Pt was admitted to [facility] for closer monitoring of arrythmia and COVID infection. Now in NSR and stable for transfer to [facility].       SUBJECTIVE:  Patient seen and examined at bedside. Endorsed no acute complaints.     Vital Signs Last 12 Hrs  T(F): 98.3 (08-15-23 @ 05:31), Max: 98.5 (08-15-23 @ 04:12)  HR: 70 (08-15-23 @ 05:31) (67 - 88)  BP: 151/66 (-15- @ 05:31) (111/56 - 161/66)  BP(mean): 95 (-15- @ 05:31) (95 - 95)  RR: 20 (08-15-23 @ 05:31) (16 - 20)  SpO2: 96% (08-15-23 @ 05:31) (93% - 96%)  I&O's Summary      PHYSICAL EXAM:  HEAD:  Atraumatic, normocephalic  EYES: EOMI, PERRLA, conjunctiva and sclera clear  NECK: Supple, trachea midline, no JVD  HEART: Regular rate and rhythm, no murmurs, rubs, or gallops  LUNGS: Unlabored respirations.  Clear to auscultation bilaterally, no crackles, wheezing, or rhonchi  ABDOMEN: Soft, nontender, nondistended, +BS  EXTREMITIES: 2+ peripheral pulses bilaterally. No clubbing, cyanosis, or edema  NERVOUS SYSTEM:  A&Ox3, moving all extremities, no focal deficits   SKIN: No rashes or lesions        LABS:                        9.7    4.99  )-----------( 291      ( 14 Aug 2023 19:38 )             34.3     08-14    136  |  101  |  14  ----------------------------<  114<H>  3.4<L>   |  22  |  1.10    Ca    9.3      14 Aug 2023 19:38    TPro  7.9  /  Alb  3.3<L>  /  TBili  0.3  /  DBili  x   /  AST  22  /  ALT  14  /  AlkPhos  51  08-14    PT/INR - ( 14 Aug 2023 19:38 )   PT: 12.5 sec;   INR: 1.14          PTT - ( 14 Aug 2023 19:38 )  PTT:34.0 sec  Urinalysis Basic - ( 14 Aug 2023 19:38 )    Color: Yellow / Appearance: Cloudy / S.010 / pH: x  Gluc: 114 mg/dL / Ketone: Trace mg/dL  / Bili: Negative / Urobili: 1.0 mg/dL   Blood: x / Protein: 30 mg/dL / Nitrite: Negative   Leuk Esterase: Large / RBC: 3 /HPF / WBC >30 /HPF   Sq Epi: x / Non Sq Epi: x / Bacteria: Too Numerous to count /HPF          RADIOLOGY & ADDITIONAL TESTS:    MEDICATIONS  (STANDING):  aspirin  chewable 81 milliGRAM(s) Oral daily  atorvastatin 20 milliGRAM(s) Oral at bedtime  dexAMETHasone     Tablet 6 milliGRAM(s) Oral daily  enoxaparin Injectable 40 milliGRAM(s) SubCutaneous every 24 hours  famotidine    Tablet 20 milliGRAM(s) Oral daily  levothyroxine 88 MICROGram(s) Oral every 24 hours  lisinopril 5 milliGRAM(s) Oral every 24 hours    MEDICATIONS  (PRN):   HOSPITAL COURSE:  Pt is a 81 y/o F w/hx CVA (residual gait disturbance and LLE weakness, ambulates with walker), HTN, HLD, hypothyroidism, p/w cough, severe fatigue/malaise and found to be COVID positive. Initial vitals: T 98.4, , BP 94/69, RR 19, O2 86% on RA. CXR showed Left basilar focal atelectasis. Patient initially placed on NC 2L but increased to 4L NC. She was started on Decadron and Remdesivir for severe COVID. Course was c/b a tachy narrow complex arrhythmia at [facility] which broke with valsava maneuver.  Pt was admitted to [facility] for closer monitoring of arrythmia and COVID infection. Now in NSR and stable for transfer to [facility].       SUBJECTIVE:  Patient seen and examined at bedside. Endorsed lower abdominal pain, worse with palpation, suprapubic fullness and inability to urinate. Denies chest pain, SOB, n/v/d, constipation.     Vital Signs Last 12 Hrs  T(F): 98.3 (08-15-23 @ 05:31), Max: 98.5 (08-15-23 @ 04:12)  HR: 70 (08-15-23 @ 05:31) (67 - 88)  BP: 151/66 (08-15-23 @ 05:31) (111/56 - 161/66)  BP(mean): 95 (08-15-23 @ 05:31) (95 - 95)  RR: 20 (08-15-23 @ 05:31) (16 - 20)  SpO2: 96% (08-15-23 @ 05:31) (93% - 96%)  I&O's Summary      PHYSICAL EXAM:  HEAD:  Atraumatic, normocephalic  EYES: EOMI, PERRLA, conjunctiva and sclera clear  NECK: Supple, trachea midline, no JVD  HEART: Regular rate and rhythm, no murmurs, rubs, or gallops  LUNGS: Unlabored respirations.  Clear to auscultation bilaterally, no crackles, wheezing, or rhonchi  ABDOMEN: Soft, nontender, nondistended, +BS  EXTREMITIES: 2+ peripheral pulses bilaterally. No clubbing, cyanosis, or edema  NERVOUS SYSTEM:  A&Ox3, moving all extremities, no focal deficits   SKIN: No rashes or lesions        LABS:                        9.7    4.99  )-----------( 291      ( 14 Aug 2023 19:38 )             34.3     08-14    136  |  101  |  14  ----------------------------<  114<H>  3.4<L>   |  22  |  1.10    Ca    9.3      14 Aug 2023 19:38    TPro  7.9  /  Alb  3.3<L>  /  TBili  0.3  /  DBili  x   /  AST  22  /  ALT  14  /  AlkPhos  51  08-14    PT/INR - ( 14 Aug 2023 19:38 )   PT: 12.5 sec;   INR: 1.14          PTT - ( 14 Aug 2023 19:38 )  PTT:34.0 sec  Urinalysis Basic - ( 14 Aug 2023 19:38 )    Color: Yellow / Appearance: Cloudy / S.010 / pH: x  Gluc: 114 mg/dL / Ketone: Trace mg/dL  / Bili: Negative / Urobili: 1.0 mg/dL   Blood: x / Protein: 30 mg/dL / Nitrite: Negative   Leuk Esterase: Large / RBC: 3 /HPF / WBC >30 /HPF   Sq Epi: x / Non Sq Epi: x / Bacteria: Too Numerous to count /HPF          RADIOLOGY & ADDITIONAL TESTS:    MEDICATIONS  (STANDING):  aspirin  chewable 81 milliGRAM(s) Oral daily  atorvastatin 20 milliGRAM(s) Oral at bedtime  dexAMETHasone     Tablet 6 milliGRAM(s) Oral daily  enoxaparin Injectable 40 milliGRAM(s) SubCutaneous every 24 hours  famotidine    Tablet 20 milliGRAM(s) Oral daily  levothyroxine 88 MICROGram(s) Oral every 24 hours  lisinopril 5 milliGRAM(s) Oral every 24 hours    MEDICATIONS  (PRN):   HOSPITAL COURSE:  Pt is a 83 y/o F w/hx CVA (residual gait disturbance and LLE weakness, ambulates with walker), HTN, HLD, hypothyroidism, p/w cough, severe fatigue/malaise and found to be COVID positive. Initial vitals: T 98.4, , BP 94/69, RR 19, O2 86% on RA. CXR showed Left basilar focal atelectasis. Patient initially placed on NC 2L but increased to 4L NC. She was started on Decadron and Remdesivir for severe COVID, though now tapered off oxygen to room air. Course was c/b a tachy narrow complex arrhythmia at ED which broke with valsava maneuver.  Pt was admitted to [facility] for closer monitoring of arrythmia and COVID infection. Now in NSR and stable for transfer to [facility]. Having urinary retention with + UA, unable to place guerrero. Urology consulted      SUBJECTIVE:  Patient seen and examined at bedside. Endorsed lower abdominal pain, worse with palpation, suprapubic fullness and inability to urinate. Denies chest pain, SOB, n/v/d, constipation.     Vital Signs Last 12 Hrs  T(F): 98.3 (08-15-23 @ 05:31), Max: 98.5 (08-15-23 @ 04:12)  HR: 70 (08-15-23 @ 05:31) (67 - 88)  BP: 151/66 (08-15-23 @ 05:31) (111/56 - 161/66)  BP(mean): 95 (08-15-23 @ 05:31) (95 - 95)  RR: 20 (08-15-23 @ 05:31) (16 - 20)  SpO2: 96% (08-15-23 @ 05:31) (93% - 96%)  I&O's Summary      PHYSICAL EXAM:  HEAD:  Atraumatic, normocephalic  EYES: EOMI, PERRLA, conjunctiva and sclera clear  NECK: Supple, trachea midline, no JVD  HEART: Regular rate and rhythm, no murmurs, rubs, or gallops  LUNGS: Unlabored respirations.  Clear to auscultation bilaterally, no crackles, wheezing, or rhonchi  ABDOMEN: Soft, nontender, nondistended, +BS  EXTREMITIES: 2+ peripheral pulses bilaterally. No clubbing, cyanosis, or edema  NERVOUS SYSTEM:  A&Ox3, moving all extremities, no focal deficits   SKIN: No rashes or lesions        LABS:                        9.7    4.99  )-----------( 291      ( 14 Aug 2023 19:38 )             34.3     08-14    136  |  101  |  14  ----------------------------<  114<H>  3.4<L>   |  22  |  1.10    Ca    9.3      14 Aug 2023 19:38    TPro  7.9  /  Alb  3.3<L>  /  TBili  0.3  /  DBili  x   /  AST  22  /  ALT  14  /  AlkPhos  51  08-14    PT/INR - ( 14 Aug 2023 19:38 )   PT: 12.5 sec;   INR: 1.14          PTT - ( 14 Aug 2023 19:38 )  PTT:34.0 sec  Urinalysis Basic - ( 14 Aug 2023 19:38 )    Color: Yellow / Appearance: Cloudy / S.010 / pH: x  Gluc: 114 mg/dL / Ketone: Trace mg/dL  / Bili: Negative / Urobili: 1.0 mg/dL   Blood: x / Protein: 30 mg/dL / Nitrite: Negative   Leuk Esterase: Large / RBC: 3 /HPF / WBC >30 /HPF   Sq Epi: x / Non Sq Epi: x / Bacteria: Too Numerous to count /HPF          RADIOLOGY & ADDITIONAL TESTS:    MEDICATIONS  (STANDING):  aspirin  chewable 81 milliGRAM(s) Oral daily  atorvastatin 20 milliGRAM(s) Oral at bedtime  dexAMETHasone     Tablet 6 milliGRAM(s) Oral daily  enoxaparin Injectable 40 milliGRAM(s) SubCutaneous every 24 hours  famotidine    Tablet 20 milliGRAM(s) Oral daily  levothyroxine 88 MICROGram(s) Oral every 24 hours  lisinopril 5 milliGRAM(s) Oral every 24 hours    MEDICATIONS  (PRN):

## 2023-08-15 NOTE — H&P ADULT - PROBLEM SELECTOR PLAN 9
H/o hypothyroidism. Home meds: Levothyroxine  - c/w home med H/o hypothyroidism. Home meds: Levothyroxine 88mg qd  - c/w home med

## 2023-08-15 NOTE — PROGRESS NOTE ADULT - PROBLEM SELECTOR PLAN 4
UA positive, leukocyte esterase, WBC, and bacteria.   Pt asymptomatic,  denies dysuria or increase in urinary frequency.    Plan  - continue to monitor

## 2023-08-15 NOTE — PHYSICAL THERAPY INITIAL EVALUATION ADULT - PERTINENT HX OF CURRENT PROBLEM, REHAB EVAL
81 y/o F w/hx CVA (2015 w/ residual gait disturbance and LLE weakness, ambulates with walker), HTN, HLD, hypothyroidism, p/w cough, severe fatigue/malaise and poor appetite/reduced PO intake for 3 days with night sweats, no documented fever. + home COVID test and + known contact of son. Pt is unclear whether she's been vaccinated against COVID but has had COVID 1x prior, which pt states was a mild case and did not require hospitalization. While in the ED, pt developed a tachy narrow-complex arrhythmia with a rate in the 170s for approximately 1 minute that broke with pt bearing down. Repeat EKG shows pt back in normal sinus rhythm.

## 2023-08-15 NOTE — PROGRESS NOTE ADULT - ASSESSMENT
Pt is a 81 y/o F w/hx CVA (residual gait disturbance and LLE weakness, ambulates with walker), HTN, HLD, hypothyroidism, p/w cough, severe fatigue/malaise likely 2/2 to COVID infection. Pt also developed a tachy narrow complex arrhythmia at ED which broke with valsava maneuver. Pt being admitted to [facility] for closer monitoring of arrythmia and COVID infection.

## 2023-08-15 NOTE — H&P ADULT - PROBLEM SELECTOR PLAN 3
i/s/o COVID infection and hypoxia. EKG showing tachy narrow complex arrhythmia with rate in the 170s for approximately 1 minute that broke with pt doing valsava manuver. Repeat EKG showing NSR.  - repeat EKG  - EP consult in the AM i/s/o COVID infection and hypoxia. EKG showing tachy narrow complex arrhythmia with rate in the 170s for approximately 1 minute that broke with pt doing valsava manuver. Repeat EKG showing NSR.  - repeat EKG  - EP consult in the AM  - f/u TTE

## 2023-08-15 NOTE — PHYSICAL THERAPY INITIAL EVALUATION ADULT - GAIT DEVIATIONS NOTED, PT EVAL
decreased cyndy/decreased velocity of limb motion/decreased step length/decreased weight-shifting ability

## 2023-08-15 NOTE — H&P ADULT - PROBLEM SELECTOR PLAN 8
H/o hyperlipidemia. Home meds:  - c/w home med H/o hyperlipidemia. Home meds: rosuvastatin 20mg  - c/w home med

## 2023-08-15 NOTE — PROGRESS NOTE ADULT - PROBLEM SELECTOR PLAN 8
H/o hyperlipidemia. Home meds: rosuvastatin 20mg  - c/w home med H/o hyperlipidemia. Home meds: rosuvastatin 20mg    Plan  - c/w home med

## 2023-08-15 NOTE — H&P ADULT - PROBLEM SELECTOR PLAN 1
COVID positive on admission. In the ED, satting 86% now on 2L NC. Pt had COVID previously but was not hospitalized. CXR: Left basilar focal atelectasis  - Decadron 6mg qd  - supportive care  - hold remdesivir/ as pt has non-severe COVID  - On 2L NC, wean off O2 as tolerated COVID positive on admission. In the ED, satting 86% now on 2L NC. Pt had COVID previously but was not hospitalized. Pt is unsure if she ever got vaccinated for COVID. CXR: Left basilar focal atelectasis  - Decadron 6mg qd  - supportive care  - hold remdesivir/ as pt has non-severe COVID  - On 2L NC, wean off O2 as tolerated

## 2023-08-15 NOTE — PATIENT PROFILE ADULT - FALL HARM RISK - HARM RISK INTERVENTIONS

## 2023-08-15 NOTE — PROGRESS NOTE ADULT - PROBLEM SELECTOR PLAN 6
Pt with residual gait disturbance and LLE weakness, ambulates with walker. Home meds: Rosuvastain 20 and aspirin 81  - c/w home med Pt with residual gait disturbance and LLE weakness, ambulates with walker.  Home meds: Rosuvastain 20 and aspirin 81    Plan  - c/w home med

## 2023-08-16 ENCOUNTER — TRANSCRIPTION ENCOUNTER (OUTPATIENT)
Age: 83
End: 2023-08-16

## 2023-08-16 DIAGNOSIS — N39.0 URINARY TRACT INFECTION, SITE NOT SPECIFIED: ICD-10-CM

## 2023-08-16 DIAGNOSIS — E87.6 HYPOKALEMIA: ICD-10-CM

## 2023-08-16 DIAGNOSIS — K59.00 CONSTIPATION, UNSPECIFIED: ICD-10-CM

## 2023-08-16 LAB
A1C WITH ESTIMATED AVERAGE GLUCOSE RESULT: 5.4 % — SIGNIFICANT CHANGE UP (ref 4–5.6)
ALBUMIN SERPL ELPH-MCNC: 3 G/DL — LOW (ref 3.3–5)
ALP SERPL-CCNC: 42 U/L — SIGNIFICANT CHANGE UP (ref 40–120)
ALT FLD-CCNC: 7 U/L — LOW (ref 10–45)
ANION GAP SERPL CALC-SCNC: 8 MMOL/L — SIGNIFICANT CHANGE UP (ref 5–17)
ANISOCYTOSIS BLD QL: SLIGHT — SIGNIFICANT CHANGE UP
AST SERPL-CCNC: 16 U/L — SIGNIFICANT CHANGE UP (ref 10–40)
BASOPHILS # BLD AUTO: 0 K/UL — SIGNIFICANT CHANGE UP (ref 0–0.2)
BASOPHILS NFR BLD AUTO: 0 % — SIGNIFICANT CHANGE UP (ref 0–2)
BILIRUB SERPL-MCNC: <0.2 MG/DL — SIGNIFICANT CHANGE UP (ref 0.2–1.2)
BUN SERPL-MCNC: 14 MG/DL — SIGNIFICANT CHANGE UP (ref 7–23)
BURR CELLS BLD QL SMEAR: PRESENT — SIGNIFICANT CHANGE UP
CALCIUM SERPL-MCNC: 8.9 MG/DL — SIGNIFICANT CHANGE UP (ref 8.4–10.5)
CHLORIDE SERPL-SCNC: 110 MMOL/L — HIGH (ref 96–108)
CO2 SERPL-SCNC: 22 MMOL/L — SIGNIFICANT CHANGE UP (ref 22–31)
CREAT SERPL-MCNC: 0.59 MG/DL — SIGNIFICANT CHANGE UP (ref 0.5–1.3)
EGFR: 89 ML/MIN/1.73M2 — SIGNIFICANT CHANGE UP
ELLIPTOCYTES BLD QL SMEAR: SLIGHT — SIGNIFICANT CHANGE UP
EOSINOPHIL # BLD AUTO: 0 K/UL — SIGNIFICANT CHANGE UP (ref 0–0.5)
EOSINOPHIL NFR BLD AUTO: 0 % — SIGNIFICANT CHANGE UP (ref 0–6)
ESTIMATED AVERAGE GLUCOSE: 108 MG/DL — SIGNIFICANT CHANGE UP (ref 68–114)
GIANT PLATELETS BLD QL SMEAR: PRESENT — SIGNIFICANT CHANGE UP
GLUCOSE BLDC GLUCOMTR-MCNC: 138 MG/DL — HIGH (ref 70–99)
GLUCOSE BLDC GLUCOMTR-MCNC: 94 MG/DL — SIGNIFICANT CHANGE UP (ref 70–99)
GLUCOSE SERPL-MCNC: 113 MG/DL — HIGH (ref 70–99)
HCT VFR BLD CALC: 30.1 % — LOW (ref 34.5–45)
HCT VFR BLD CALC: 32 % — LOW (ref 34.5–45)
HGB BLD-MCNC: 8.4 G/DL — LOW (ref 11.5–15.5)
HGB BLD-MCNC: 9.3 G/DL — LOW (ref 11.5–15.5)
HYPOCHROMIA BLD QL: SLIGHT — SIGNIFICANT CHANGE UP
LYMPHOCYTES # BLD AUTO: 0.32 K/UL — LOW (ref 1–3.3)
LYMPHOCYTES # BLD AUTO: 8.9 % — LOW (ref 13–44)
MAGNESIUM SERPL-MCNC: 2 MG/DL — SIGNIFICANT CHANGE UP (ref 1.6–2.6)
MANUAL SMEAR VERIFICATION: SIGNIFICANT CHANGE UP
MCHC RBC-ENTMCNC: 21 PG — LOW (ref 27–34)
MCHC RBC-ENTMCNC: 21.1 PG — LOW (ref 27–34)
MCHC RBC-ENTMCNC: 27.9 GM/DL — LOW (ref 32–36)
MCHC RBC-ENTMCNC: 29.1 GM/DL — LOW (ref 32–36)
MCV RBC AUTO: 72.7 FL — LOW (ref 80–100)
MCV RBC AUTO: 75.3 FL — LOW (ref 80–100)
MICROCYTES BLD QL: SLIGHT — SIGNIFICANT CHANGE UP
MONOCYTES # BLD AUTO: 0.06 K/UL — SIGNIFICANT CHANGE UP (ref 0–0.9)
MONOCYTES NFR BLD AUTO: 1.8 % — LOW (ref 2–14)
NEUTROPHILS # BLD AUTO: 3.19 K/UL — SIGNIFICANT CHANGE UP (ref 1.8–7.4)
NEUTROPHILS NFR BLD AUTO: 89.3 % — HIGH (ref 43–77)
NRBC # BLD: 0 /100 WBCS — SIGNIFICANT CHANGE UP (ref 0–0)
OVALOCYTES BLD QL SMEAR: SLIGHT — SIGNIFICANT CHANGE UP
PHOSPHATE SERPL-MCNC: 4.2 MG/DL — SIGNIFICANT CHANGE UP (ref 2.5–4.5)
PLAT MORPH BLD: NORMAL — SIGNIFICANT CHANGE UP
PLATELET # BLD AUTO: 262 K/UL — SIGNIFICANT CHANGE UP (ref 150–400)
PLATELET # BLD AUTO: 300 K/UL — SIGNIFICANT CHANGE UP (ref 150–400)
POIKILOCYTOSIS BLD QL AUTO: SIGNIFICANT CHANGE UP
POLYCHROMASIA BLD QL SMEAR: SLIGHT — SIGNIFICANT CHANGE UP
POTASSIUM SERPL-MCNC: 3.8 MMOL/L — SIGNIFICANT CHANGE UP (ref 3.5–5.3)
POTASSIUM SERPL-SCNC: 3.8 MMOL/L — SIGNIFICANT CHANGE UP (ref 3.5–5.3)
PROT SERPL-MCNC: 5.6 G/DL — LOW (ref 6–8.3)
RBC # BLD: 4 M/UL — SIGNIFICANT CHANGE UP (ref 3.8–5.2)
RBC # BLD: 4.4 M/UL — SIGNIFICANT CHANGE UP (ref 3.8–5.2)
RBC # FLD: 17.3 % — HIGH (ref 10.3–14.5)
RBC # FLD: 17.4 % — HIGH (ref 10.3–14.5)
RBC BLD AUTO: ABNORMAL
SMUDGE CELLS # BLD: PRESENT — SIGNIFICANT CHANGE UP
SODIUM SERPL-SCNC: 140 MMOL/L — SIGNIFICANT CHANGE UP (ref 135–145)
WBC # BLD: 3.57 K/UL — LOW (ref 3.8–10.5)
WBC # BLD: 4.38 K/UL — SIGNIFICANT CHANGE UP (ref 3.8–10.5)
WBC # FLD AUTO: 3.57 K/UL — LOW (ref 3.8–10.5)
WBC # FLD AUTO: 4.38 K/UL — SIGNIFICANT CHANGE UP (ref 3.8–10.5)

## 2023-08-16 PROCEDURE — 99232 SBSQ HOSP IP/OBS MODERATE 35: CPT | Mod: GC

## 2023-08-16 RX ORDER — POTASSIUM CHLORIDE 20 MEQ
40 PACKET (EA) ORAL ONCE
Refills: 0 | Status: DISCONTINUED | OUTPATIENT
Start: 2023-08-16 | End: 2023-08-16

## 2023-08-16 RX ORDER — SENNA PLUS 8.6 MG/1
2 TABLET ORAL AT BEDTIME
Refills: 0 | Status: DISCONTINUED | OUTPATIENT
Start: 2023-08-16 | End: 2023-08-18

## 2023-08-16 RX ORDER — ACETAMINOPHEN 500 MG
650 TABLET ORAL ONCE
Refills: 0 | Status: COMPLETED | OUTPATIENT
Start: 2023-08-16 | End: 2023-08-16

## 2023-08-16 RX ORDER — POTASSIUM CHLORIDE 20 MEQ
40 PACKET (EA) ORAL ONCE
Refills: 0 | Status: COMPLETED | OUTPATIENT
Start: 2023-08-16 | End: 2023-08-16

## 2023-08-16 RX ORDER — POLYETHYLENE GLYCOL 3350 17 G/17G
17 POWDER, FOR SOLUTION ORAL DAILY
Refills: 0 | Status: DISCONTINUED | OUTPATIENT
Start: 2023-08-16 | End: 2023-08-18

## 2023-08-16 RX ADMIN — SENNA PLUS 2 TABLET(S): 8.6 TABLET ORAL at 21:31

## 2023-08-16 RX ADMIN — Medication 88 MICROGRAM(S): at 11:14

## 2023-08-16 RX ADMIN — LISINOPRIL 5 MILLIGRAM(S): 2.5 TABLET ORAL at 07:17

## 2023-08-16 RX ADMIN — ATORVASTATIN CALCIUM 20 MILLIGRAM(S): 80 TABLET, FILM COATED ORAL at 21:31

## 2023-08-16 RX ADMIN — Medication 40 MILLIEQUIVALENT(S): at 07:17

## 2023-08-16 RX ADMIN — CEFTRIAXONE 100 MILLIGRAM(S): 500 INJECTION, POWDER, FOR SOLUTION INTRAMUSCULAR; INTRAVENOUS at 19:33

## 2023-08-16 RX ADMIN — FAMOTIDINE 20 MILLIGRAM(S): 10 INJECTION INTRAVENOUS at 18:52

## 2023-08-16 RX ADMIN — Medication 81 MILLIGRAM(S): at 18:52

## 2023-08-16 RX ADMIN — Medication 650 MILLIGRAM(S): at 20:20

## 2023-08-16 RX ADMIN — ENOXAPARIN SODIUM 40 MILLIGRAM(S): 100 INJECTION SUBCUTANEOUS at 18:52

## 2023-08-16 RX ADMIN — REMDESIVIR 200 MILLIGRAM(S): 5 INJECTION INTRAVENOUS at 19:34

## 2023-08-16 RX ADMIN — Medication 650 MILLIGRAM(S): at 19:33

## 2023-08-16 RX ADMIN — Medication 6 MILLIGRAM(S): at 07:17

## 2023-08-16 NOTE — PROGRESS NOTE ADULT - SUBJECTIVE AND OBJECTIVE BOX
***INCOMPLETE NOTE***    INTERVAL EVENTS:   No acute events overnight.    SUBJECTIVE / INTERVAL HPI:   Patient seen and examined at bedside.     ROS: negative unless otherwise stated above.    VITAL SIGNS:  Vital Signs Last 24 Hrs  T(C): 36.5 (16 Aug 2023 05:34), Max: 37.3 (15 Aug 2023 14:29)  T(F): 97.7 (16 Aug 2023 05:34), Max: 99.1 (15 Aug 2023 14:29)  HR: 63 (16 Aug 2023 05:34) (63 - 96)  BP: 150/71 (16 Aug 2023 05:34) (112/56 - 150/71)  BP(mean): 111 (15 Aug 2023 20:30) (75 - 111)  RR: 18 (16 Aug 2023 05:34) (18 - 37)  SpO2: 94% (16 Aug 2023 05:34) (93% - 100%)    Parameters below as of 16 Aug 2023 05:34  Patient On (Oxygen Delivery Method): room air          08-15-23 @ 07:01  -  08-16-23 @ 06:54  --------------------------------------------------------  IN: 562 mL / OUT: 1500 mL / NET: -938 mL        MEDICATIONS:  MEDICATIONS  (STANDING):  aspirin  chewable 81 milliGRAM(s) Oral daily  atorvastatin 20 milliGRAM(s) Oral at bedtime  cefTRIAXone   IVPB 1000 milliGRAM(s) IV Intermittent every 24 hours  dexAMETHasone     Tablet 6 milliGRAM(s) Oral daily  dextrose 5%. 1000 milliLiter(s) (50 mL/Hr) IV Continuous <Continuous>  dextrose 5%. 1000 milliLiter(s) (100 mL/Hr) IV Continuous <Continuous>  dextrose 50% Injectable 25 Gram(s) IV Push once  dextrose 50% Injectable 12.5 Gram(s) IV Push once  dextrose 50% Injectable 25 Gram(s) IV Push once  enoxaparin Injectable 40 milliGRAM(s) SubCutaneous every 24 hours  famotidine    Tablet 20 milliGRAM(s) Oral daily  glucagon  Injectable 1 milliGRAM(s) IntraMuscular once  insulin lispro (ADMELOG) corrective regimen sliding scale   SubCutaneous three times a day before meals  levothyroxine 88 MICROGram(s) Oral every 24 hours  lisinopril 5 milliGRAM(s) Oral every 24 hours  polyethylene glycol 3350 17 Gram(s) Oral daily  potassium chloride    Tablet ER 40 milliEquivalent(s) Oral once  remdesivir  IVPB   IV Intermittent   remdesivir  IVPB 100 milliGRAM(s) IV Intermittent every 24 hours  senna 2 Tablet(s) Oral at bedtime    MEDICATIONS  (PRN):  dextrose Oral Gel 15 Gram(s) Oral once PRN Blood Glucose LESS THAN 70 milliGRAM(s)/deciliter      ALLERGIES:  Allergies    No Known Allergies    Intolerances        LABS:                        9.5    3.64  )-----------( 291      ( 15 Aug 2023 12:10 )             33.1     08-15    140  |  109<H>  |  18  ----------------------------<  127<H>  3.3<L>   |  21<L>  |  0.61    Ca    9.2      15 Aug 2023 12:10  Phos  3.0     08-15  Mg     2.0     08-15    TPro  6.3  /  Alb  3.3  /  TBili  0.2  /  DBili  x   /  AST  14  /  ALT  9<L>  /  AlkPhos  50  08-15    PT/INR - ( 14 Aug 2023 19:38 )   PT: 12.5 sec;   INR: 1.14          PTT - ( 14 Aug 2023 19:38 )  PTT:34.0 sec  Urinalysis Basic - ( 15 Aug 2023 12:10 )    Color: x / Appearance: x / SG: x / pH: x  Gluc: 127 mg/dL / Ketone: x  / Bili: x / Urobili: x   Blood: x / Protein: x / Nitrite: x   Leuk Esterase: x / RBC: x / WBC x   Sq Epi: x / Non Sq Epi: x / Bacteria: x      CAPILLARY BLOOD GLUCOSE          RADIOLOGY & ADDITIONAL TESTS: Reviewed. ***INCOMPLETE NOTE***    INTERVAL EVENTS:    • Developed symptomatic bacteriuria, started on CTX   • UCx ordered, results pending   • EKG ordered to monitor SVT, pending    SUBJECTIVE / INTERVAL HPI:   Patient seen and examined at bedside.     ROS: negative unless otherwise stated above.    VITAL SIGNS:  Vital Signs Last 24 Hrs  T(C): 36.5 (16 Aug 2023 05:34), Max: 37.3 (15 Aug 2023 14:29)  T(F): 97.7 (16 Aug 2023 05:34), Max: 99.1 (15 Aug 2023 14:29)  HR: 63 (16 Aug 2023 05:34) (63 - 96)  BP: 150/71 (16 Aug 2023 05:34) (112/56 - 150/71)  BP(mean): 111 (15 Aug 2023 20:30) (75 - 111)  RR: 18 (16 Aug 2023 05:34) (18 - 37)  SpO2: 94% (16 Aug 2023 05:34) (93% - 100%)    Parameters below as of 16 Aug 2023 05:34  Patient On (Oxygen Delivery Method): room air          08-15-23 @ 07:01  -  08-16-23 @ 06:54  --------------------------------------------------------  IN: 562 mL / OUT: 1500 mL / NET: -938 mL        MEDICATIONS:  MEDICATIONS  (STANDING):  aspirin  chewable 81 milliGRAM(s) Oral daily  atorvastatin 20 milliGRAM(s) Oral at bedtime  cefTRIAXone   IVPB 1000 milliGRAM(s) IV Intermittent every 24 hours  dexAMETHasone     Tablet 6 milliGRAM(s) Oral daily  dextrose 5%. 1000 milliLiter(s) (50 mL/Hr) IV Continuous <Continuous>  dextrose 5%. 1000 milliLiter(s) (100 mL/Hr) IV Continuous <Continuous>  dextrose 50% Injectable 25 Gram(s) IV Push once  dextrose 50% Injectable 12.5 Gram(s) IV Push once  dextrose 50% Injectable 25 Gram(s) IV Push once  enoxaparin Injectable 40 milliGRAM(s) SubCutaneous every 24 hours  famotidine    Tablet 20 milliGRAM(s) Oral daily  glucagon  Injectable 1 milliGRAM(s) IntraMuscular once  insulin lispro (ADMELOG) corrective regimen sliding scale   SubCutaneous three times a day before meals  levothyroxine 88 MICROGram(s) Oral every 24 hours  lisinopril 5 milliGRAM(s) Oral every 24 hours  polyethylene glycol 3350 17 Gram(s) Oral daily  potassium chloride    Tablet ER 40 milliEquivalent(s) Oral once  remdesivir  IVPB   IV Intermittent   remdesivir  IVPB 100 milliGRAM(s) IV Intermittent every 24 hours  senna 2 Tablet(s) Oral at bedtime    MEDICATIONS  (PRN):  dextrose Oral Gel 15 Gram(s) Oral once PRN Blood Glucose LESS THAN 70 milliGRAM(s)/deciliter      ALLERGIES:  Allergies    No Known Allergies    Intolerances        LABS:                        9.5    3.64  )-----------( 291      ( 15 Aug 2023 12:10 )             33.1     08-15    140  |  109<H>  |  18  ----------------------------<  127<H>  3.3<L>   |  21<L>  |  0.61    Ca    9.2      15 Aug 2023 12:10  Phos  3.0     08-15  Mg     2.0     08-15    TPro  6.3  /  Alb  3.3  /  TBili  0.2  /  DBili  x   /  AST  14  /  ALT  9<L>  /  AlkPhos  50  08-15    PT/INR - ( 14 Aug 2023 19:38 )   PT: 12.5 sec;   INR: 1.14          PTT - ( 14 Aug 2023 19:38 )  PTT:34.0 sec  Urinalysis Basic - ( 15 Aug 2023 12:10 )    Color: x / Appearance: x / SG: x / pH: x  Gluc: 127 mg/dL / Ketone: x  / Bili: x / Urobili: x   Blood: x / Protein: x / Nitrite: x   Leuk Esterase: x / RBC: x / WBC x   Sq Epi: x / Non Sq Epi: x / Bacteria: x      CAPILLARY BLOOD GLUCOSE          RADIOLOGY & ADDITIONAL TESTS: Reviewed. INTERVAL EVENTS:    • Developed symptomatic bacteriuria, started on CTX   • UCx ordered, results pending   • EKG ordered to monitor SVT, pending    SUBJECTIVE / INTERVAL HPI:   Patient seen and examined at bedside.     ROS: negative unless otherwise stated above.    VITAL SIGNS:  Vital Signs Last 24 Hrs  T(C): 36.5 (16 Aug 2023 05:34), Max: 37.3 (15 Aug 2023 14:29)  T(F): 97.7 (16 Aug 2023 05:34), Max: 99.1 (15 Aug 2023 14:29)  HR: 63 (16 Aug 2023 05:34) (63 - 96)  BP: 150/71 (16 Aug 2023 05:34) (112/56 - 150/71)  BP(mean): 111 (15 Aug 2023 20:30) (75 - 111)  RR: 18 (16 Aug 2023 05:34) (18 - 37)  SpO2: 94% (16 Aug 2023 05:34) (93% - 100%)    Parameters below as of 16 Aug 2023 05:34  Patient On (Oxygen Delivery Method): room air          08-15-23 @ 07:01  -  08-16-23 @ 06:54  --------------------------------------------------------  IN: 562 mL / OUT: 1500 mL / NET: -938 mL        MEDICATIONS:  MEDICATIONS  (STANDING):  aspirin  chewable 81 milliGRAM(s) Oral daily  atorvastatin 20 milliGRAM(s) Oral at bedtime  cefTRIAXone   IVPB 1000 milliGRAM(s) IV Intermittent every 24 hours  dexAMETHasone     Tablet 6 milliGRAM(s) Oral daily  dextrose 5%. 1000 milliLiter(s) (50 mL/Hr) IV Continuous <Continuous>  dextrose 5%. 1000 milliLiter(s) (100 mL/Hr) IV Continuous <Continuous>  dextrose 50% Injectable 25 Gram(s) IV Push once  dextrose 50% Injectable 12.5 Gram(s) IV Push once  dextrose 50% Injectable 25 Gram(s) IV Push once  enoxaparin Injectable 40 milliGRAM(s) SubCutaneous every 24 hours  famotidine    Tablet 20 milliGRAM(s) Oral daily  glucagon  Injectable 1 milliGRAM(s) IntraMuscular once  insulin lispro (ADMELOG) corrective regimen sliding scale   SubCutaneous three times a day before meals  levothyroxine 88 MICROGram(s) Oral every 24 hours  lisinopril 5 milliGRAM(s) Oral every 24 hours  polyethylene glycol 3350 17 Gram(s) Oral daily  potassium chloride    Tablet ER 40 milliEquivalent(s) Oral once  remdesivir  IVPB   IV Intermittent   remdesivir  IVPB 100 milliGRAM(s) IV Intermittent every 24 hours  senna 2 Tablet(s) Oral at bedtime    MEDICATIONS  (PRN):  dextrose Oral Gel 15 Gram(s) Oral once PRN Blood Glucose LESS THAN 70 milliGRAM(s)/deciliter      ALLERGIES:  Allergies    No Known Allergies    Intolerances        LABS:                        9.5    3.64  )-----------( 291      ( 15 Aug 2023 12:10 )             33.1     08-15    140  |  109<H>  |  18  ----------------------------<  127<H>  3.3<L>   |  21<L>  |  0.61    Ca    9.2      15 Aug 2023 12:10  Phos  3.0     08-15  Mg     2.0     08-15    TPro  6.3  /  Alb  3.3  /  TBili  0.2  /  DBili  x   /  AST  14  /  ALT  9<L>  /  AlkPhos  50  08-15    PT/INR - ( 14 Aug 2023 19:38 )   PT: 12.5 sec;   INR: 1.14          PTT - ( 14 Aug 2023 19:38 )  PTT:34.0 sec  Urinalysis Basic - ( 15 Aug 2023 12:10 )    Color: x / Appearance: x / SG: x / pH: x  Gluc: 127 mg/dL / Ketone: x  / Bili: x / Urobili: x   Blood: x / Protein: x / Nitrite: x   Leuk Esterase: x / RBC: x / WBC x   Sq Epi: x / Non Sq Epi: x / Bacteria: x      CAPILLARY BLOOD GLUCOSE          RADIOLOGY & ADDITIONAL TESTS: Reviewed.

## 2023-08-16 NOTE — PROGRESS NOTE ADULT - PROBLEM SELECTOR PLAN 3
EKG in ED showing tachy narrow complex arrhythmia with rate in the 170s for approximately 1 minute that broke with pt doing valsava manuver. Repeat EKG showing NSR.  Possibly 2/2 COVID infection and hypoxia     Plan   - f/u TTE  - consider EP consult if patient experiences arrhythmia again COVID positive at home and in the ED    Plan   - as above

## 2023-08-16 NOTE — DISCHARGE NOTE PROVIDER - CARE PROVIDERS DIRECT ADDRESSES
,[email or web address] ,DirectAddress_Unknown ,DirectAddress_Unknown,DirectAddress_Unknown

## 2023-08-16 NOTE — PROGRESS NOTE ADULT - PROBLEM SELECTOR PLAN 12
Fluids: s/p 1.75 L  Electrolytes: Replete to Mg >2, K >4  Nutrition: consistent carb diet  Prophylaxis: lovenox  Activity: fall risk  GI: none  C: FC  Dispo: [facility] Pt has h/o six abdominal surgeries including hysterectomy. She reports chronic pain for which she takes Amitriptyline 25mg  Pt. currently not complaining of any abdominal pain     Plan  - f/u EKG and consider restarting if QTc normal and pt has abdominal pain

## 2023-08-16 NOTE — DISCHARGE NOTE PROVIDER - NSDCMRMEDTOKEN_GEN_ALL_CORE_FT
amitriptyline 25 mg oral tablet: 1 orally once a day  aspirin 81 mg oral capsule: 1 orally once a day  famotidine 20 mg oral tablet: 1 orally once a day  levothyroxine 88 mcg (0.088 mg) oral tablet: 1 orally once a day  lisinopril 5 mg oral tablet: 1 tab(s) orally once a day  raloxifene 60 mg oral tablet: 1 tab(s) orally once a day  rosuvastatin 20 mg oral tablet: 1 orally once a day   amitriptyline 25 mg oral tablet: 1 orally once a day  aspirin 81 mg oral capsule: 1 orally once a day  cephalexin 500 mg oral capsule: 1 cap(s) orally every 12 hours  famotidine 20 mg oral tablet: 1 orally once a day  levothyroxine 88 mcg (0.088 mg) oral tablet: 1 orally once a day  lisinopril 5 mg oral tablet: 1 tab(s) orally once a day  raloxifene 60 mg oral tablet: 1 tab(s) orally once a day  rosuvastatin 20 mg oral tablet: 1 orally once a day

## 2023-08-16 NOTE — PROGRESS NOTE ADULT - PROBLEM SELECTOR PLAN 8
H/o hyperlipidemia. Home meds: rosuvastatin 20mg  - c/w home med h/o HTN. Home meds: lisinopril 5mg qd  - c/w home med

## 2023-08-16 NOTE — PROGRESS NOTE ADULT - PROBLEM SELECTOR PLAN 10
Pt has h/o six abdominal surgeries including hysterectomy. She reports chronic pain for which she takes Amitriptyline 25mg  - f/u EKG and consider restarting if QTc normal and pt has abdominal pain H/o hypothyroidism. Home meds: Levothyroxine 88mg qd  - c/w home med

## 2023-08-16 NOTE — PROGRESS NOTE ADULT - ASSESSMENT
Pt is a 83 y/o F w/hx CVA (residual gait disturbance and LLE weakness, ambulates with walker), HTN, HLD, hypothyroidism, p/w cough, severe fatigue/malaise likely 2/2 to COVID infection. Pt also developed a tachy narrow complex arrhythmia at ED which broke with valsava maneuver. Pt being admitted to [facility] for closer monitoring of arrythmia and COVID infection.   Pt is a 81 y/o F w/hx CVA (residual gait disturbance and LLE weakness, ambulates with walker), HTN, HLD, hypothyroidism, p/w cough, severe fatigue/malaise found to be COVID+. Pt also developed a tachy narrow complex arrhythmia at [facility] which broke with valsava maneuver and did not recur. Pt now in NSR and with symptoms of dysuria and increased urinary frequency. Transferred to [facility] for management of urinary tract infection and continued treatment of COVID.

## 2023-08-16 NOTE — DISCHARGE NOTE PROVIDER - PROVIDER TOKENS
PROVIDER:[TOKEN:[[id number]]] PROVIDER:[TOKEN:[[id number]],SCHEDULEDAPPT:[08/23/2023],SCHEDULEDAPPTTIME:[01:00 PM]] PROVIDER:[TOKEN:[[id number]],SCHEDULEDAPPT:[08/23/2023],SCHEDULEDAPPTTIME:[01:00 PM]],PROVIDER:[TOKEN:[[id number]],SCHEDULEDAPPT:[08/30/2023],SCHEDULEDAPPTTIME:[12:20 PM]]

## 2023-08-16 NOTE — PROGRESS NOTE ADULT - PROBLEM SELECTOR PLAN 8
H/o hyperlipidemia. Home meds: rosuvastatin 20mg    Plan  - c/w home med h/o HTN. Home meds: lisinopril 5mg qd. Creatinine 0.61.     Plan  - Continue with home meds Pt with residual gait disturbance and LLE weakness, ambulates with walker.  Home meds: Rosuvastatin 20 mg and aspirin 81 mg    Plan  - Continue with home meds

## 2023-08-16 NOTE — PROGRESS NOTE ADULT - PROBLEM SELECTOR PLAN 7
h/o HTN. Home meds: lisinopril 5mg qd    Plan  - c/w home med Pt with residual gait disturbance and LLE weakness, ambulates with walker.  Home meds: Rosuvastatin 20 mg and aspirin 81 mg    Plan  - Continue with home meds EKG in ED showing tachy narrow complex arrhythmia with rate in the 170s for approximately 1 minute that broke with pt doing valsava manuver. Repeat EKG showing NSR. Possibly 2/2 COVID infection and hypoxia     Plan   - consider EP consult if patient experiences arrhythmia again

## 2023-08-16 NOTE — PROGRESS NOTE ADULT - PROBLEM SELECTOR PLAN 4
UA positive, leukocyte esterase, WBC, and bacteria.   Pt asymptomatic,  denies dysuria or increase in urinary frequency.    Plan  - continue to monitor Hgb 9.7 on admission. Unknown baseline. Iron studies WNL.     Plan  - Continue to monitor H/H  - transfuse if <7  - active T&S Hgb 9.7 on admission. Unknown baseline. Iron studies obtained- transferrin, ferritin, and TIBC WNL. Total iron level low at 17. Patient not complaining of fatigue, dizziness.     Plan  - Continue to monitor H/H  - transfuse if <7  - active T&S

## 2023-08-16 NOTE — PROGRESS NOTE ADULT - PROBLEM SELECTOR PLAN 9
H/o hypothyroidism. Home meds: Levothyroxine 88mg qd  - c/w home med H/o hyperlipidemia. Home meds: rosuvastatin 20mg  - c/w home med

## 2023-08-16 NOTE — DISCHARGE NOTE PROVIDER - NSDCFUADDAPPT_GEN_ALL_CORE_FT
Please follow up with your primary care provider within the next two weeks as we discussed.  (1) Please follow up with your primary care provider within the next two weeks as we discussed.     Please bring your Insurance card, Photo ID and Discharge paperwork to the following appointment:    (2) Please follow up with your Urology Provider, Dr. Brayden Silva at [address] [place] [address] on 08/23/2023 at 1:00pm.    Appointment was scheduled by Ms. MAIA Zamora, Referral Coordinator.   (1) Please follow up with your primary care provider within the next two weeks as we discussed.     Please confirm with your Insurance if you are required to obtain a referral from your PCP to follow up with your specialty appointments listed below.    Please bring your Insurance card, Current List of Medication, Photo ID and Discharge paperwork to the following appointments:      (2) Please follow up with your Urology Provider, Dr. Brayden Silva at [address], [facility] [address] [address] on 08/23/2023 at 1:00pm.    Appointment was scheduled by Ms. MAIA Zamora, Referral Coordinator.    (3) Please follow up with your Cardiology Provider, Dr. Manny Davis at [address] [place] [address] on 08/30/2023 at 12:20pm.    Appointment was scheduled by Ms. MAIA Zamora, Referral Coordinator.

## 2023-08-16 NOTE — DISCHARGE NOTE PROVIDER - HOSPITAL COURSE
Pt is a 81 y/o F w/hx CVA (residual gait disturbance and LLE weakness, ambulates with walker), HTN, HLD, hypothyroidism, p/w cough, severe fatigue/malaise likely 2/2 to COVID infection. Pt also developed a tachy narrow complex arrhythmia at ED which broke with valsava maneuver. Pt being admitted to [facility] for closer monitoring of arrythmia and COVID infection.      #Acute respiratory failure with hypoxia.   ·COVID positive on admission. In the ED, satting 86% now on 2L NC. Pt had COVID previously but was not hospitalized. Pt is unsure if she ever got vaccinated for COVID. CXR: Left basilar focal atelectasis  -Decadron 6mg qd  - supportive care  - hold remdesivir/ as pt has non-severe COVID  - On 2L NC, wean off O2 as tolerated.    #COVID-19 virus infection.   ·  Plan: COVID positive at home and in the ED  -plan as above.    #Narrow complex tachycardia.   ·i/s/o COVID infection and hypoxia. EKG showing tachy narrow complex arrhythmia with rate in the 170s for approximately 1 minute that broke with pt doing valsava manuver. Repeat EKG showing NSR.  - repeat EKG  - EP consult in the AM  - f/u TTE.    #Asymptomatic bacteriuria.   ·UA positive, leukocyte esterase, WBC, and bacteria. Pt asymptomatic,  denies dysuria or increase in urinary frequency.  - continue to monitor.    #Microcytic anemia.   ·Hgb 9.7 on admission. unknown baseline.  - f/u iron panel  - transfuse if <7  - active T&S.    #CVA (cerebrovascular accident).   ·Pt with residual gait disturbance and LLE weakness, ambulates with walker. Home meds: Rosuvastain 20 and aspirin 81  - c/w home med.    #Hypertension.   · h/o HTN. Home meds: lisinopril 5mg qd  - c/w home med.    #Hyperlipidemia.   ·H/o hyperlipidemia. Home meds: rosuvastatin 20mg  - c/w home med.    #Hypothyroidism.   ·H/o hypothyroidism. Home meds: Levothyroxine 88mg qd  - c/w home med.    #Abdominal pain.   ·Pt has h/o six abdominal surgeries including hysterectomy. She reports chronic pain for which she takes Amitriptyline 25mg  - f/u EKG and consider restarting if QTc normal and pt has abdominal pain.    Patient was discharged to:    New medications:   Changes to old medications:  Medications that were stopped:    Items to follow up as outpatient:    Physical exam at the time of discharge:              Pt is a 83 y/o F w/hx CVA (residual gait disturbance and LLE weakness, ambulates with walker), HTN, HLD, hypothyroidism, p/w cough, severe fatigue/malaise likely 2/2 to COVID infection. Pt also developed a tachy narrow complex arrhythmia at ED which broke with valsava maneuver. Pt being admitted to [facility] for closer monitoring of arrythmia and COVID infection.      #Acute respiratory failure with hypoxia.   ·COVID positive on admission. In the ED, satting 86% now on 2L NC. Pt had COVID previously but was not hospitalized. Pt is unsure if she ever got vaccinated for COVID. CXR: Left basilar focal atelectasis. Decadron 6mg. Held remdesivir as pt has non-severe COVID. Was on 6L weaned downed to RA. Ambulatory saturations 94-97%.     #COVID-19 virus infection.   COVID positive at home and in the ED    #Narrow complex tachycardia.   ·i/s/o COVID infection and hypoxia. EKG showing tachy narrow complex arrhythmia with rate in the 170s for approximately 1 minute that broke with pt doing valsava manuver. Repeat EKG showing NSR    #Urinary Retention  Patient with urinary retention, guerrero was placed by urology secondarily to difficult anatomy. TOV completed on day prior to discharge as to which was passed.     #Asymptomatic bacteriuria.   ·UA positive, leukocyte esterase, WBC, and bacteria. Pt asymptomatic,  denies dysuria or increase in urinary frequency.     #Microcytic anemia.   ·Hgb 9.7 on admission. unknown baseline. Seemingly iron deficiency anemia but iso of active infection, no iron supplementation provided.     #CVA (cerebrovascular accident).   ·Pt with residual gait disturbance and LLE weakness, ambulates with walker. Home meds: Rosuvastain 20 and aspirin 81  - c/w home meds.    #Hypertension.   · h/o HTN. Home meds: lisinopril 5mg qd  - c/w home meds.    #Hyperlipidemia.   ·H/o hyperlipidemia. Home meds: rosuvastatin 20mg  - c/w home meds.    #Hypothyroidism.   ·H/o hypothyroidism. Home meds: Levothyroxine 88mg qd  - c/w home med.    #Abdominal pain.   ·Pt has h/o six abdominal surgeries including hysterectomy. She reports chronic pain for which she takes Amitriptyline 25mg  - Continue with home med    Patient was discharged to: Home    New medications: None    Changes to old medications: None    Medications that were stopped: None    Items to follow up as outpatient: Primary Care Follow up     Physical exam at the time of discharge:   GENERAL: NAD, lying in bed comfortably  HEAD:  Atraumatic, normocephalic  EYES: EOMI, PERRLA, conjunctiva and sclera clear  NECK: Supple, trachea midline, no JVD  HEART: Regular rate and rhythm, no murmurs, rubs, or gallops  LUNGS: Unlabored respirations.  Clear to auscultation bilaterally, no crackles, wheezing, or rhonchi  ABDOMEN: Soft, nontender, nondistended, +BS  EXTREMITIES: 2+ peripheral pulses bilaterally. No clubbing, cyanosis, or edema  NERVOUS SYSTEM:  A&Ox3, moving all extremities, no focal deficits   SKIN: No rashes or lesions             Pt is a 81 y/o F w/hx CVA (residual gait disturbance and LLE weakness, ambulates with walker), HTN, HLD, hypothyroidism, p/w cough, severe fatigue/malaise likely 2/2 to COVID infection. Pt also developed a tachy narrow complex arrhythmia at ED which broke with valsava maneuver. Pt being admitted to [facility] for closer monitoring of arrythmia and COVID infection.      #Acute respiratory failure with hypoxia.   ·COVID positive on admission. In the ED, satting 86% now on 2L NC. Pt had COVID previously but was not hospitalized. Pt is unsure if she ever got vaccinated for COVID. CXR: Left basilar focal atelectasis. Decadron 6mg. Held remdesivir as pt has non-severe COVID. Was on 6L weaned downed to RA. Ambulatory saturations 94-97%.     #COVID-19 virus infection.   COVID positive at home and in the ED    #Narrow complex tachycardia.   ·i/s/o COVID infection and hypoxia. EKG showing tachy narrow complex arrhythmia with rate in the 170s for approximately 1 minute that broke with pt doing valsava manuver. Repeat EKG showing NSR    #Urinary Retention  Patient with urinary retention, guerrero was placed by urology secondarily to difficult anatomy. T    #Asymptomatic bacteriuria.   ·UA positive, leukocyte esterase, WBC, and bacteria. Pt asymptomatic,  denies dysuria or increase in urinary frequency.     #Microcytic anemia.   ·Hgb 9.7 on admission. unknown baseline. Seemingly iron deficiency anemia but iso of active infection, no iron supplementation provided.     #CVA (cerebrovascular accident).   ·Pt with residual gait disturbance and LLE weakness, ambulates with walker. Home meds: Rosuvastain 20 and aspirin 81  - c/w home meds.    #Hypertension.   · h/o HTN. Home meds: lisinopril 5mg qd  - c/w home meds.    #Hyperlipidemia.   ·H/o hyperlipidemia. Home meds: rosuvastatin 20mg  - c/w home meds.    #Hypothyroidism.   ·H/o hypothyroidism. Home meds: Levothyroxine 88mg qd  - c/w home med.    #Abdominal pain.   ·Pt has h/o six abdominal surgeries including hysterectomy. She reports chronic pain for which she takes Amitriptyline 25mg  - Continue with home med    Patient was discharged to: Home    New medications: None    Changes to old medications: None    Medications that were stopped: None    Items to follow up as outpatient: Primary Care Follow up     Physical exam at the time of discharge:   GENERAL: NAD, lying in bed comfortably  HEAD:  Atraumatic, normocephalic  EYES: EOMI, PERRLA, conjunctiva and sclera clear  NECK: Supple, trachea midline, no JVD  HEART: Regular rate and rhythm, no murmurs, rubs, or gallops  LUNGS: Unlabored respirations.  Clear to auscultation bilaterally, no crackles, wheezing, or rhonchi  ABDOMEN: Soft, nontender, nondistended, +BS  EXTREMITIES: 2+ peripheral pulses bilaterally. No clubbing, cyanosis, or edema  NERVOUS SYSTEM:  A&Ox3, moving all extremities, no focal deficits   SKIN: No rashes or lesions             Pt is a 83 y/o F w/hx CVA (residual gait disturbance and LLE weakness, ambulates with walker), HTN, HLD, hypothyroidism, p/w cough, severe fatigue/malaise likely 2/2 to COVID infection. Pt also developed a tachy narrow complex arrhythmia at ED which broke with valsava maneuver. Pt being admitted to [facility] for closer monitoring of arrythmia and COVID infection.      #Acute respiratory failure with hypoxia.   ·COVID positive on admission. In the ED, satting 86% now on 2L NC. Pt had COVID previously but was not hospitalized. Pt is unsure if she ever got vaccinated for COVID. CXR: Left basilar focal atelectasis. Decadron 6mg. Held remdesivir as pt has non-severe COVID. Was on 6L weaned downed to RA. Ambulatory saturations 94-97%.     #COVID-19 virus infection.   COVID positive at home and in the ED    #Narrow complex tachycardia.   ·i/s/o COVID infection and hypoxia. EKG showing tachy narrow complex arrhythmia with rate in the 170s for approximately 1 minute that broke with pt doing valsava manuver. Repeat EKG showing NSR    #Urinary Retention  Patient with urinary retention, guerrero was placed by urology secondarily to difficult anatomy. T    #Asymptomatic bacteriuria.   UA positive, leukocyte esterase, WBC, and bacteria. Pt asymptomatic,  denies dysuria or increase in urinary frequency.     #Microcytic anemia.   Hgb 9.7 on admission. unknown baseline. Seemingly iron deficiency anemia but iso of active infection, no iron supplementation provided.     #CVA (cerebrovascular accident).   ·Pt with residual gait disturbance and LLE weakness, ambulates with walker. Home meds: Rosuvastain 20 and aspirin 81  - c/w home meds.    #Hypertension.   · h/o HTN. Home meds: lisinopril 5mg qd  - c/w home meds.    #Hyperlipidemia.   ·H/o hyperlipidemia. Home meds: rosuvastatin 20mg  - c/w home meds.    #Hypothyroidism.   ·H/o hypothyroidism. Home meds: Levothyroxine 88mg qd  - c/w home med.    #Abdominal pain.   ·Pt has h/o six abdominal surgeries including hysterectomy. She reports chronic pain for which she takes Amitriptyline 25mg  - Continue with home med    Patient was discharged to: Home    New medications: None    Changes to old medications: None    Medications that were stopped: None    Items to follow up as outpatient: Primary Care Follow up     Physical exam at the time of discharge:   GENERAL: NAD, lying in bed comfortably  HEAD:  Atraumatic, normocephalic  EYES: EOMI, PERRLA, conjunctiva and sclera clear  NECK: Supple, trachea midline, no JVD  HEART: Regular rate and rhythm, no murmurs, rubs, or gallops  LUNGS: Unlabored respirations.  Clear to auscultation bilaterally, no crackles, wheezing, or rhonchi  ABDOMEN: Soft, nontender, nondistended, +BS  EXTREMITIES: 2+ peripheral pulses bilaterally. No clubbing, cyanosis, or edema  NERVOUS SYSTEM:  A&Ox3, moving all extremities, no focal deficits   SKIN: No rashes or lesions

## 2023-08-16 NOTE — DISCHARGE NOTE PROVIDER - CARE PROVIDER_API CALL
Yuliya Ventura  Urology  [address] [address]  Phone: [phone]  Fax: [phone]  Follow Up Time:    Brayden Silva  Urology  [address] [place] [address]  Phone: [phone]  Fax: [phone]  Scheduled Appointment: 08/23/2023 01:00 PM   Brayden Silva  Urology  [address], [address]  [place] [address]  Phone: [phone]  Fax: [phone]  Scheduled Appointment: 08/23/2023 01:00 PM    Manny Davis  Cardiology  [address] [address]  Phone: [phone]  Fax: [phone]  Scheduled Appointment: 08/30/2023 12:20 PM

## 2023-08-16 NOTE — PROGRESS NOTE ADULT - PROBLEM SELECTOR PLAN 11
Fluids: s/p 1.75 L  Electrolytes: Mg >2, K >4  Nutrition: consistent carb diet  Prophylaxis: lovenox  Activity: fall risk  GI: none  C: FC  Dispo: 7[facility]ach Fluids: s/p 1.75 L  Electrolytes: Mg >2, K >4  Nutrition: consistent carb diet  Prophylaxis: lovenox  Activity: fall risk  GI: none  C: FC  Dispo: Pt has h/o six abdominal surgeries including hysterectomy. She reports chronic pain for which she takes Amitriptyline 25mg  - f/u EKG and consider restarting if QTc normal and pt has abdominal pain

## 2023-08-16 NOTE — PROGRESS NOTE ADULT - PROBLEM SELECTOR PLAN 4
UA positive, leukocyte esterase, WBC, and bacteria. Pt asymptomatic,  denies dysuria or increase in urinary frequency.  - continue to monitor UA positive, leukocyte esterase, WBC, and bacteria. Pt asymptomatic,  denies dysuria, but experienced worsening incontinence in recent days.   - Flores placed for obstructive symptoms i/s/o COVID infection and hypoxia. EKG showing tachy narrow complex arrhythmia with rate in the 170s for approximately 1 minute that broke with pt doing valsava manuver. Repeat EKG showing NSR.  - Patient w/o arrhythmia since initial episode.

## 2023-08-16 NOTE — PROGRESS NOTE ADULT - PROBLEM SELECTOR PLAN 2
COVID positive at home and in the ED  -plan as above COVID positive at home and in the ED.   - Assess pt's functional status with ambulatory vitals COVID positive on admission. In the ED, satting 86% now on 2L NC. Pt had COVID previously but was not hospitalized. Pt is unsure if she ever got vaccinated for COVID. CXR: Left basilar focal atelectasis  - C/w Decadron 6mg qd  - supportive care  - Hold remdesivir/ as pt has non-severe COVID  - On 2L NC, wean off O2 as tolerated

## 2023-08-16 NOTE — PROGRESS NOTE ADULT - NSPROGADDITIONALINFOA_GEN_ALL_CORE
#Prophylactic measure  Fluids: s/p 1.75 L  Electrolytes: Mg >2, K >4  Nutrition: consistent carb diet  Prophylaxis: lovenox  Activity: fall risk  GI: none  C: FC  Dispo: 7[facility]ach. #Prophylactic measure  Fluids: s/p 1.75 L  Electrolytes: Mg >2, K >4  Nutrition: consistent carb diet  Prophylaxis: lovenox  Activity: fall risk  GI: none  C: FC  Dispo: [facility]OMI

## 2023-08-16 NOTE — PROGRESS NOTE ADULT - PROBLEM SELECTOR PLAN 7
h/o HTN. Home meds: lisinopril 5mg qd  - c/w home med Pt with residual gait disturbance and LLE weakness, ambulates with walker. Home meds: Rosuvastain 20 and aspirin 81  - c/w home med

## 2023-08-16 NOTE — PROGRESS NOTE ADULT - PROBLEM SELECTOR PLAN 9
H/o hypothyroidism. Home meds: Levothyroxine 88mg qd    Plan  - c/w home med H/o hyperlipidemia. Home meds: rosuvastatin 20mg    Plan  - Continue with home meds h/o HTN. Home meds: lisinopril 5mg qd. Creatinine 0.61.     Plan  - Continue with home meds

## 2023-08-16 NOTE — PROGRESS NOTE ADULT - PROBLEM SELECTOR PLAN 12
Fluids: s/p 1.75 L  Electrolytes: Mg >2, K >4  Nutrition: consistent carb diet  Prophylaxis: lovenox  Activity: fall risk  GI: none  C: FC  Dispo:

## 2023-08-16 NOTE — PROGRESS NOTE ADULT - PROBLEM SELECTOR PLAN 11
Fluids: s/p 1.75 L  Electrolytes: Mg >2, K >4  Nutrition: consistent carb diet  Prophylaxis: lovenox  Activity: fall risk  GI: none  C: FC  Dispo: 7[facility] Pt has h/o six abdominal surgeries including hysterectomy. She reports chronic pain for which she takes Amitriptyline 25mg  Pt. currently not complaining of any abdominal pain     Plan  - f/u EKG and consider restarting if QTc normal and pt has abdominal pain H/o hypothyroidism. Home meds: Levothyroxine 88mg qd    Plan  - Continue with home meds

## 2023-08-16 NOTE — PROGRESS NOTE ADULT - PROBLEM SELECTOR PLAN 5
Hgb 9.7 on admission. Unknown baseline.    Plan  - f/u iron panel- likely DEANNA   - transfuse if <7  - active T&S Patient with K of 3.3 on 08/15.   - Replete with KCl Patient endorses constipation with last BM 3-4 days ago. Typically has a bowel movement daily. On exam, abdomen is soft, non-tender, and non-distended with bowel sounds present in all four quadrants. Started patient on Senna and Miralax.   - Continue with bowel regimen

## 2023-08-16 NOTE — PROGRESS NOTE ADULT - PROBLEM SELECTOR PLAN 2
COVID positive at home and in the ED    Plan   - as above Patient had a + home COVID test and known sick contact of her COVID + son. Pt is unsure if she ever got COVID vaccination. SpO2 86% on RA upon presentation to ED, tested positive for COVID  CXR: Left basilar focal atelectasis    Plan   - Continue with Decadron 6mg   - Continue with Remdesevir taper. Received 1 loading dose  mg on 08/15. Continue with IV Remdesivir 100 mg for subsequent 4 days (5 days total treatment)  - Continue with supportive care

## 2023-08-16 NOTE — DISCHARGE NOTE PROVIDER - NSDCCPCAREPLAN_GEN_ALL_CORE_FT
PRINCIPAL DISCHARGE DIAGNOSIS  Diagnosis: Acute respiratory failure, unspecified whether with hypoxia or hypercapnia  Assessment and Plan of Treatment: You presented to the hospital with COVID pneumonia. Secondarily to that you required incresaed oxygen as compared to your baseline. We provided you with supplemental oxygen as well as steroids to help reduce the inflammation in your lungs that is caused by COVID and you improved back to your baseline, no longer requiring any supplemental oxygen.      SECONDARY DISCHARGE DIAGNOSES  Diagnosis: 2019 novel coronavirus disease (COVID-19)  Assessment and Plan of Treatment: You presented to the hospital with COVID pneumonia. We treated you with steroids to help reduce the inflammation occurring inside of your lungs. You are going home on no supplemental oxygen.    Diagnosis: Supraventricular arrhythmia  Assessment and Plan of Treatment: As a result of COVID you developed an arrythmia that caused you to have an elevated heart rate. This was resolved through a natural technique called the valsalva maneuver. You were put on the telemetry unit secondarily to ensure that we monitored your heart and your condition closely. Once we saw that you were back in a normal rhythym for an extended period of time as the covid began resolving, we brought you down to the medical floors where you were treated.    Diagnosis: Urinary retention  Assessment and Plan of Treatment: You were found to have urinary retention during your stay and consequently had a guerrero that was placed by urology. We removed the guerrero and you passed our trial void which allowed us to comfortably send you home without a guerrero.    Diagnosis: Microcytic anemia  Assessment and Plan of Treatment: Anemia is the medical term for when a person has too few red blood cells. Red blood cells are the cells in your blood that carry oxygen. If you have too few red blood cells, your body does not get all the oxygen it needs. Most people with anemia have no symptoms. They find out they have it after their doctor does blood tests for another reason. People who do have symptoms might feel tired or weak, especially if they try to exercise or have headaches. If you experience these symptoms you should see your primary care provider for further evaluation.  You were found to have low iron levels within the blood which is contributing to your anemia. In the setting of infection we won't typically provide you with iron but advise that you follow up closely with your primary care provider to determine if iron supplementation is necessary.       Diagnosis: Hypertension  Assessment and Plan of Treatment: You have a known history of high blood pressure prior to your admission. To manage this you are on a medication called lisinopril at 5mg daily as to which we recommend that you continue with. High blood pressure can cause damage to your heart and kidneys and increases your risk of heart attack and stroke. To avoid this, It is important that you continue to take this medication when you are discharged so that you can continue to control your blood pressure. Additionally be sure to follow up with your primary care physician on a regular basis to make sure your blood pressure continues to be well controlled. If you experience symptoms such as but not limited to: sudden onset blurry vision, nausea, vomiting, chest pain, shortness of breath, or palpitations, please go to the nearest emergency room.      Diagnosis: CVA (cerebrovascular accident)  Assessment and Plan of Treatment: You have a history of CVA, aka stroke, and consequently are taking rosuvastatin 20 mg once at night and aspirin 81 mg daily. Please continue with these home medications.    Diagnosis: Hyperlipidemia  Assessment and Plan of Treatment: You have a known history of high cholesterol prior to your admission. To manage this you are on a medication called rosuvastatin at 20 mg to be taken once at night, please continue taking. High cholesterol is bad because it can cause damage to your heart and puts you at risk for heart attack and stroke.  It is important that you continue to take this medication when you are discharged so that you can continue to control your level of cholesterol. Additionally be sure to follow up with your primary care physician on a regular basis to make sure your triglyceride and cholesterol levels continue to be well controlled.      Diagnosis: Hypothyroidism  Assessment and Plan of Treatment: You were noted to have a history of hypothyroidism. Please continue to take your levothyroxine 88 mcg daily. It is important that you take this medication on an empty stomach, preferably 60 minutes before a meal.  Please follow-up with your primary care physician. Should you start to experience symptoms, such as, but not limited to:  fatigue/tiredness, feeling cold, poor memory/concentration, constipation, indigestion, dry skin, hair loss, slower heart rate, and weight gain with poor appetite, please let your primary care physician know. This may indicate your levothyroxine dosage may need to be readjusted.      Diagnosis: Abdominal pain  Assessment and Plan of Treatment: You have chronic abdominal pain secondarily to multiple surgeries. You are on amitryptilline 25 mg daily for pain control. Please continue taking this medication.     PRINCIPAL DISCHARGE DIAGNOSIS  Diagnosis: Acute respiratory failure, unspecified whether with hypoxia or hypercapnia  Assessment and Plan of Treatment: You presented to the hospital with COVID pneumonia. Secondarily to that you required incresaed oxygen as compared to your baseline. We provided you with supplemental oxygen as well as steroids to help reduce the inflammation in your lungs that is caused by COVID and you improved back to your baseline, no longer requiring any supplemental oxygen.      SECONDARY DISCHARGE DIAGNOSES  Diagnosis: 2019 novel coronavirus disease (COVID-19)  Assessment and Plan of Treatment: You presented to the hospital with COVID pneumonia. We treated you with steroids to help reduce the inflammation occurring inside of your lungs. You are going home on no supplemental oxygen.    Diagnosis: Supraventricular arrhythmia  Assessment and Plan of Treatment: As a result of COVID you developed an arrythmia that caused you to have an elevated heart rate. This was resolved through a natural technique called the valsalva maneuver. You were put on the telemetry unit secondarily to ensure that we monitored your heart and your condition closely. Once we saw that you were back in a normal rhythym for an extended period of time as the covid began resolving, we brought you down to the medical floors where you were treated. You had one incidental finding during your time on the floors that resolved spontaneously. Please follow up with the cardiology appointment that we provided you.    Diagnosis: Urinary retention  Assessment and Plan of Treatment: You were found to have urinary retention during your stay and consequently had a guerrero that was placed by urology. We removed the guerrero in hopes that you would pass our trial void however you were found to continue retaining urine. Urology came by and replaced the guerrero that you will be going home with. Nursing explained how to empty your bag at bedside. We scheduled an appointment [facility] on 8/23 for you to follow up which is necessary for further guidance and understanding of how long you might need the guerrero.  You also developed a UTI during your stay secondarily to urinary retention as to which we provided a three day course of antibiotics that you finished.    Diagnosis: Microcytic anemia  Assessment and Plan of Treatment: Anemia is the medical term for when a person has too few red blood cells. Red blood cells are the cells in your blood that carry oxygen. If you have too few red blood cells, your body does not get all the oxygen it needs. Most people with anemia have no symptoms. They find out they have it after their doctor does blood tests for another reason. People who do have symptoms might feel tired or weak, especially if they try to exercise or have headaches. If you experience these symptoms you should see your primary care provider for further evaluation.  You were found to have low iron levels within the blood which is contributing to your anemia. In the setting of infection we won't typically provide you with iron but advise that you follow up closely with your primary care provider to determine if iron supplementation is necessary.       Diagnosis: Hypertension  Assessment and Plan of Treatment: You have a known history of high blood pressure prior to your admission. To manage this you are on a medication called lisinopril at 5mg daily as to which we recommend that you continue with. High blood pressure can cause damage to your heart and kidneys and increases your risk of heart attack and stroke. To avoid this, It is important that you continue to take this medication when you are discharged so that you can continue to control your blood pressure. Additionally be sure to follow up with your primary care physician on a regular basis to make sure your blood pressure continues to be well controlled. If you experience symptoms such as but not limited to: sudden onset blurry vision, nausea, vomiting, chest pain, shortness of breath, or palpitations, please go to the nearest emergency room.      Diagnosis: CVA (cerebrovascular accident)  Assessment and Plan of Treatment: You have a history of CVA, aka stroke, and consequently are taking rosuvastatin 20 mg once at night and aspirin 81 mg daily. Please continue with these home medications.    Diagnosis: Hyperlipidemia  Assessment and Plan of Treatment: You have a known history of high cholesterol prior to your admission. To manage this you are on a medication called rosuvastatin at 20 mg to be taken once at night, please continue taking. High cholesterol is bad because it can cause damage to your heart and puts you at risk for heart attack and stroke.  It is important that you continue to take this medication when you are discharged so that you can continue to control your level of cholesterol. Additionally be sure to follow up with your primary care physician on a regular basis to make sure your triglyceride and cholesterol levels continue to be well controlled.      Diagnosis: Hypothyroidism  Assessment and Plan of Treatment: You were noted to have a history of hypothyroidism. Please continue to take your levothyroxine 88 mcg daily. It is important that you take this medication on an empty stomach, preferably 60 minutes before a meal.  Please follow-up with your primary care physician. Should you start to experience symptoms, such as, but not limited to:  fatigue/tiredness, feeling cold, poor memory/concentration, constipation, indigestion, dry skin, hair loss, slower heart rate, and weight gain with poor appetite, please let your primary care physician know. This may indicate your levothyroxine dosage may need to be readjusted.      Diagnosis: Abdominal pain  Assessment and Plan of Treatment: You have chronic abdominal pain secondarily to multiple surgeries. You are on amitryptilline 25 mg daily for pain control. Please continue taking this medication.

## 2023-08-16 NOTE — DISCHARGE NOTE PROVIDER - NSDCCPGOAL_GEN_ALL_CORE_FT
To get better and follow your care plan as instructed. [Poor] : ~his/her~ current health as poor [Good] : ~his/her~  mood as  good [One fall no injury in past year] : Patient reported one fall in the past year without injury [0] : 1) Little interest or pleasure doing things: Not at all (0) [Patient reported mammogram was normal] : Patient reported mammogram was normal [None] : None [Alone] : lives alone [Retired] : retired [College] : College [Significant Other] : lives with significant other [Feels Safe at Home] : Feels safe at home [Fully functional (bathing, dressing, toileting, transferring, walking, feeding)] : Fully functional (bathing, dressing, toileting, transferring, walking, feeding) [Reports changes in hearing] : Reports changes in hearing [Reports changes in vision] : Reports changes in vision [Reports changes in dental health] : Reports changes in dental health [Smoke Detector] : smoke detector [Carbon Monoxide Detector] : carbon monoxide detector [Safety elements used in home] : safety elements used in home [Seat Belt] :  uses seat belt [Sunscreen] : uses sunscreen [Designated Healthcare Proxy] : Designated healthcare proxy [Name: ___] : Health Care Proxy's Name: [unfilled]  [Relationship: ___] : Relationship: [unfilled] [1] : 2) Feeling down, depressed, or hopeless for several days (1) [# Of Children ___] : has [unfilled] children [With Patient/Caregiver] : With Patient/Caregiver [Aggressive treatment] : aggressive treatment [I will adhere to the patient's wishes as expressed in the advance directive except as noted below.] : I will adhere to the patient's wishes as expressed in the advance directive except as noted below [FreeTextEntry1] : Falling, Pt reports not feeling well. 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 [Change in mental status noted] : No change in mental status noted [[id number]] : 1 [Language] : denies difficulty with language [Behavior] : denies difficulty with behavior [Learning/Retaining New Information] : denies difficulty learning/retaining new information [Reasoning] : denies difficulty with reasoning [Handling Complex Tasks] : denies difficulty handling complex tasks [Reports normal functional visual acuity (ie: able to read med bottle)] : Reports poor functional visual acuity.  [Spatial Ability and Orientation] : denies difficulty with spatial ability and orientation [Travel to Developing Areas] : does not  travel to developing areas [TB Exposure] : is not being exposed to tuberculosis [Guns at Home] : no guns at home [MammogramDate] : 11/18 [Caregiver Concerns] : does not have caregiver concerns [BoneDensityComments] : years ago [ColonoscopyComments] : years ago [PapSmearComments] : years ago [AdvancecareDate] : 05/19

## 2023-08-16 NOTE — PROGRESS NOTE ADULT - PROBLEM SELECTOR PLAN 1
COVID positive on admission. In the ED, satting 86% now on 2L NC. Pt had COVID previously but was not hospitalized. Pt is unsure if she ever got vaccinated for COVID. CXR: Left basilar focal atelectasis  - Decadron 6mg qd  - supportive care  - hold remdesivir/ as pt has non-severe COVID  - On 2L NC, wean off O2 as tolerated COVID positive on admission. In the ED, satting 86% now on 2L NC. Pt had COVID previously but was not hospitalized. Pt is unsure if she ever got vaccinated for COVID. CXR: Left basilar focal atelectasis  - C/w Decadron 6mg qd  - supportive care  - Hold remdesivir/ as pt has non-severe COVID  - On 2L NC, wean off O2 as tolerated Flores placed for retention after resistance on straight cath. Removed per urology. Passed TOV attempted prior to discharge on 8/16, f/u bladder scan showed 114mL retention.     • Straight cath in AM

## 2023-08-16 NOTE — PROGRESS NOTE ADULT - SUBJECTIVE AND OBJECTIVE BOX
***TRANSFER FROM [facility]***  HOSPITAL COURSE:  Pt is a 81 y/o F w/hx CVA (residual gait disturbance and LLE weakness, ambulates with walker), HTN, HLD, hypothyroidism, p/w cough, severe fatigue/malaise and found to be COVID positive. Initial vitals: T 98.4, , BP 94/69, RR 19, O2 86% on RA. CXR showed Left basilar focal atelectasis. Patient initially placed on NC 2L but increased to 4L NC, now tapered off oxygen to room air. She was started on Decadron and Remdesivir for severe COVID. Course was c/b a tachy narrow complex arrhythmia at [facility] which broke with valsava maneuver.  Pt was admitted to [facility] for closer monitoring of arrythmia and COVID infection. This morning, patient with +UA leukocyte esterase, WBCs, and bacteria but asymptomatic. As day progressed, she started having urinary retention and complaints of increased urinary frequency and dysuria. Urology was consulted to place a Flores catheter and she was started on CTX for management of UTI.  Now in NSR and stable, ready for transfer to [facility].     SUBJECTIVE:  Patient seen and examined at bedside. Endorsed lower abdominal pain, worse with palpation, suprapubic fullness and inability to urinate. Denies chest pain, SOB, n/v/d, constipation.     Vital Signs Last 24 Hrs  T(C): 36.4 (15 Aug 2023 23:04), Max: 37.3 (15 Aug 2023 14:29)  T(F): 97.6 (15 Aug 2023 23:04), Max: 99.1 (15 Aug 2023 14:29)  HR: 63 (15 Aug 2023 23:04) (63 - 96)  BP: 140/58 (15 Aug 2023 23:04) (112/56 - 151/66)  BP(mean): 111 (15 Aug 2023 20:30) (75 - 111)  RR: 18 (15 Aug 2023 23:04) (16 - 37)  SpO2: 94% (15 Aug 2023 23:04) (93% - 100%)    Parameters below as of 15 Aug 2023 23:04  Patient On (Oxygen Delivery Method): room air      I&O's Summary    15 Aug 2023 07:01  -  16 Aug 2023 01:43  --------------------------------------------------------  IN: 562 mL / OUT: 700 mL / NET: -138 mL      PHYSICAL EXAM:  HEAD:  Atraumatic, normocephalic  EYES: EOMI, PERRLA, conjunctiva and sclera clear  NECK: Supple, trachea midline, no JVD  HEART: Regular rate and rhythm, no murmurs, rubs, or gallops  LUNGS: Unlabored respirations.  Clear to auscultation bilaterally, no crackles, wheezing, or rhonchi  ABDOMEN: Soft, nontender, nondistended, +BS  GENITOURINARY:   EXTREMITIES: 2+ peripheral pulses bilaterally. No clubbing, cyanosis, or edema  NERVOUS SYSTEM:  A&Ox3, moving all extremities, no focal deficits   SKIN: No rashes or lesions        LABS:             .  LABS:                         9.5    3.64  )-----------( 291      ( 15 Aug 2023 12:10 )             33.1     08-15    140  |  109<H>  |  18  ----------------------------<  127<H>  3.3<L>   |  21<L>  |  0.61    Ca    9.2      15 Aug 2023 12:10  Phos  3.0     08-15  Mg     2.0     08-15    TPro  6.3  /  Alb  3.3  /  TBili  0.2  /  DBili  x   /  AST  14  /  ALT  9<L>  /  AlkPhos  50  08-15    PT/INR - ( 14 Aug 2023 19:38 )   PT: 12.5 sec;   INR: 1.14          PTT - ( 14 Aug 2023 19:38 )  PTT:34.0 sec  Urinalysis Basic - ( 15 Aug 2023 12:10 )    Color: x / Appearance: x / SG: x / pH: x  Gluc: 127 mg/dL / Ketone: x  / Bili: x / Urobili: x   Blood: x / Protein: x / Nitrite: x   Leuk Esterase: x / RBC: x / WBC x   Sq Epi: x / Non Sq Epi: x / Bacteria: x                RADIOLOGY, EKG & ADDITIONAL TESTS: Reviewed.           RADIOLOGY & ADDITIONAL TESTS:    MEDICATIONS  (STANDING):  aspirin  chewable 81 milliGRAM(s) Oral daily  atorvastatin 20 milliGRAM(s) Oral at bedtime  dexAMETHasone     Tablet 6 milliGRAM(s) Oral daily  enoxaparin Injectable 40 milliGRAM(s) SubCutaneous every 24 hours  famotidine    Tablet 20 milliGRAM(s) Oral daily  levothyroxine 88 MICROGram(s) Oral every 24 hours  lisinopril 5 milliGRAM(s) Oral every 24 hours    MEDICATIONS  (PRN):   ***TRANSFER FROM [facility]***  HOSPITAL COURSE:  Pt is a 81 y/o F w/hx CVA (residual gait disturbance and LLE weakness, ambulates with walker), HTN, HLD, hypothyroidism, p/w cough, severe fatigue/malaise and found to be COVID positive. Initial vitals: T 98.4, , BP 94/69, RR 19, O2 86% on RA. CXR showed Left basilar focal atelectasis. Patient initially placed on NC 2L but increased to 4L NC, now tapered off oxygen to room air. She was started on Decadron and Remdesivir for severe COVID. Course was c/b a tachy narrow complex arrhythmia at [facility] which broke with valsava maneuver.  Pt was admitted to [facility] for closer monitoring of arrythmia and COVID infection. This morning, patient with +UA leukocyte esterase, WBCs, and bacteria but asymptomatic. As day progressed, she started having urinary retention and complaints of increased urinary frequency and dysuria. Urology was consulted to place a Flores catheter and she was started on CTX for management of UTI.  Now in NSR and stable, ready for transfer to [facility].     SUBJECTIVE:  Patient seen and examined at bedside. States she is feeling much better since Flores catheter placement. Denies any abdominal or suprapubic pain. Endorses constipation. States her last bowel movement was 3-4 days ago which is atypical for her as she usually has a bowel movement once daily. Denies headache, chest pain, SOB, n/v/d. Has a history of bilateral ankle tenderness for which she has gone through PT in the past and uses compression stockings at home. Not bothering her currently.     Vital Signs Last 24 Hrs  T(C): 36.4 (15 Aug 2023 23:04), Max: 37.3 (15 Aug 2023 14:29)  T(F): 97.6 (15 Aug 2023 23:04), Max: 99.1 (15 Aug 2023 14:29)  HR: 63 (15 Aug 2023 23:04) (63 - 96)  BP: 140/58 (15 Aug 2023 23:04) (112/56 - 151/66)  BP(mean): 111 (15 Aug 2023 20:30) (75 - 111)  RR: 18 (15 Aug 2023 23:04) (16 - 37)  SpO2: 94% (15 Aug 2023 23:04) (93% - 100%)    Parameters below as of 15 Aug 2023 23:04  Patient On (Oxygen Delivery Method): room air      I&O's Summary    15 Aug 2023 07:01  -  16 Aug 2023 01:43  --------------------------------------------------------  IN: 562 mL / OUT: 700 mL / NET: -138 mL      PHYSICAL EXAM:  HEAD:  Atraumatic, normocephalic  EYES: EOMI, PERRLA, conjunctiva and sclera clear  NECK: Supple, trachea midline, no JVD  HEART: Regular rate and rhythm, no murmurs, rubs, or gallops  LUNGS: Unlabored respirations.  Clear to auscultation bilaterally, no crackles, wheezing, or rhonchi  ABDOMEN: Soft, nontender, nondistended, +BS  GENITOURINARY: No suprapubic tenderness. Flores catheter in place  EXTREMITIES: 2+ peripheral pulses bilaterally. No clubbing, cyanosis, or edema; Mild tenderness of bilateral ankle joints   NERVOUS SYSTEM:  A&Ox3, moving all extremities, no focal deficits   SKIN: No rashes or lesions    LABS:                         9.5    3.64  )-----------( 291      ( 15 Aug 2023 12:10 )             33.1     08-15    140  |  109<H>  |  18  ----------------------------<  127<H>  3.3<L>   |  21<L>  |  0.61    Ca    9.2      15 Aug 2023 12:10  Phos  3.0     08-15  Mg     2.0     08-15    TPro  6.3  /  Alb  3.3  /  TBili  0.2  /  DBili  x   /  AST  14  /  ALT  9<L>  /  AlkPhos  50  08-15    PT/INR - ( 14 Aug 2023 19:38 )   PT: 12.5 sec;   INR: 1.14          PTT - ( 14 Aug 2023 19:38 )  PTT:34.0 sec  Urinalysis Basic - ( 15 Aug 2023 12:10 )    Color: x / Appearance: x / SG: x / pH: x  Gluc: 127 mg/dL / Ketone: x  / Bili: x / Urobili: x   Blood: x / Protein: x / Nitrite: x   Leuk Esterase: x / RBC: x / WBC x   Sq Epi: x / Non Sq Epi: x / Bacteria: x                RADIOLOGY, EKG & ADDITIONAL TESTS: Reviewed.           RADIOLOGY & ADDITIONAL TESTS:    MEDICATIONS  (STANDING):  aspirin  chewable 81 milliGRAM(s) Oral daily  atorvastatin 20 milliGRAM(s) Oral at bedtime  dexAMETHasone     Tablet 6 milliGRAM(s) Oral daily  enoxaparin Injectable 40 milliGRAM(s) SubCutaneous every 24 hours  famotidine    Tablet 20 milliGRAM(s) Oral daily  levothyroxine 88 MICROGram(s) Oral every 24 hours  lisinopril 5 milliGRAM(s) Oral every 24 hours    MEDICATIONS  (PRN):

## 2023-08-16 NOTE — PROGRESS NOTE ADULT - PROBLEM SELECTOR PLAN 6
Pt with residual gait disturbance and LLE weakness, ambulates with walker. Home meds: Rosuvastain 20 and aspirin 81  - c/w home med Hgb 9.7 on admission. unknown baseline.  - f/u iron panel  - transfuse if <7  - active T&S

## 2023-08-16 NOTE — DISCHARGE NOTE PROVIDER - NSDCFUSCHEDAPPT_GEN_ALL_CORE_FT
Brayden Silva  [place] Physician [facility]  UROLOGY [address]  Scheduled Appointment: 08/23/2023     Brayden Silva  [place] Physician [facility]  UROLOGY [address]  Scheduled Appointment: 08/23/2023    Manny Davis  [place] [facility]  HEARTVASC [address]  Scheduled Appointment: 08/30/2023

## 2023-08-16 NOTE — PROGRESS NOTE ADULT - PROBLEM SELECTOR PLAN 6
Pt with residual gait disturbance and LLE weakness, ambulates with walker.  Home meds: Rosuvastain 20 and aspirin 81    Plan  - c/w home med EKG in ED showing tachy narrow complex arrhythmia with rate in the 170s for approximately 1 minute that broke with pt doing valsava manuver. Repeat EKG showing NSR. Possibly 2/2 COVID infection and hypoxia     Plan   - consider EP consult if patient experiences arrhythmia again Patient with K of 3.3 on 08/15.   - Replete with KCl

## 2023-08-16 NOTE — PROGRESS NOTE ADULT - PROBLEM SELECTOR PLAN 1
Patient had a + home COVID test and known sick contact of her COVID + son. Pt is unsure if she ever got COVID vaccination   Pt. SpO2 86% on RA upon presentation to ED, tested positive for COVID  CXR: Left basilar focal atelectasis    Plan   - c/w Decadron 6mg   - c/w Remdesevir taper  - On 4L NC, wean as tolerated  - supportive care UA positive, leukocyte esterase, WBC, and bacteria.   Pt complaining of dysuria and increased urinary frequency. Started on IV Ceftriaxone.   -Continue with IV Ceftriaxone 1g q24h   -Order urine culture for AM

## 2023-08-16 NOTE — PROGRESS NOTE ADULT - PROBLEM SELECTOR PLAN 3
i/s/o COVID infection and hypoxia. EKG showing tachy narrow complex arrhythmia with rate in the 170s for approximately 1 minute that broke with pt doing valsava manuver. Repeat EKG showing NSR.  - repeat EKG  - EP consult in the AM  - f/u TTE i/s/o COVID infection and hypoxia. EKG showing tachy narrow complex arrhythmia with rate in the 170s for approximately 1 minute that broke with pt doing valsava manuver. Repeat EKG showing NSR.  - Patient w/o arrhythmia since initial episode. COVID positive at home and in the ED.   - Assess pt's functional status with ambulatory vitals

## 2023-08-16 NOTE — PROGRESS NOTE ADULT - ASSESSMENT
Pt is a 83 y/o F w/hx CVA (residual gait disturbance and LLE weakness, ambulates with walker), HTN, HLD, hypothyroidism, p/w cough, severe fatigue/malaise likely 2/2 to COVID infection. Pt also developed a tachy narrow complex arrhythmia at ED which broke with valsava maneuver. Pt being admitted to [facility] for closer monitoring of arrythmia and COVID infection.

## 2023-08-16 NOTE — PROGRESS NOTE ADULT - PROBLEM SELECTOR PLAN 10
Pt has h/o six abdominal surgeries including hysterectomy. She reports chronic pain for which she takes Amitriptyline 25mg  Pt. currently not complaining of any abdominal pain     Plan  - f/u EKG and consider restarting if QTc normal and pt has abdominal pain H/o hypothyroidism. Home meds: Levothyroxine 88mg qd    Plan  - Continue with home meds H/o hyperlipidemia. Home meds: rosuvastatin 20mg    Plan  - Continue with home meds

## 2023-08-16 NOTE — PROGRESS NOTE ADULT - PROBLEM SELECTOR PLAN 5
Hgb 9.7 on admission. unknown baseline.  - f/u iron panel  - transfuse if <7  - active T&S UA positive, leukocyte esterase, WBC, and bacteria. Pt asymptomatic,  denies dysuria, but experienced worsening incontinence in recent days.   - Flores placed for obstructive symptoms

## 2023-08-17 DIAGNOSIS — Z97.8 PRESENCE OF OTHER SPECIFIED DEVICES: ICD-10-CM

## 2023-08-17 DIAGNOSIS — R33.9 RETENTION OF URINE, UNSPECIFIED: ICD-10-CM

## 2023-08-17 PROBLEM — E03.9 HYPOTHYROIDISM, UNSPECIFIED: Chronic | Status: ACTIVE | Noted: 2023-08-15

## 2023-08-17 PROBLEM — E78.5 HYPERLIPIDEMIA, UNSPECIFIED: Chronic | Status: ACTIVE | Noted: 2023-08-15

## 2023-08-17 PROBLEM — I10 ESSENTIAL (PRIMARY) HYPERTENSION: Chronic | Status: ACTIVE | Noted: 2023-08-15

## 2023-08-17 PROBLEM — I63.9 CEREBRAL INFARCTION, UNSPECIFIED: Chronic | Status: ACTIVE | Noted: 2023-08-15

## 2023-08-17 LAB
-  AMIKACIN: SIGNIFICANT CHANGE UP
-  AMIKACIN: SIGNIFICANT CHANGE UP
-  AMOXICILLIN/CLAVULANIC ACID: SIGNIFICANT CHANGE UP
-  AMOXICILLIN/CLAVULANIC ACID: SIGNIFICANT CHANGE UP
-  AMPICILLIN/SULBACTAM: SIGNIFICANT CHANGE UP
-  AMPICILLIN/SULBACTAM: SIGNIFICANT CHANGE UP
-  AMPICILLIN: SIGNIFICANT CHANGE UP
-  AMPICILLIN: SIGNIFICANT CHANGE UP
-  AZTREONAM: SIGNIFICANT CHANGE UP
-  AZTREONAM: SIGNIFICANT CHANGE UP
-  CEFAZOLIN: SIGNIFICANT CHANGE UP
-  CEFAZOLIN: SIGNIFICANT CHANGE UP
-  CEFEPIME: SIGNIFICANT CHANGE UP
-  CEFEPIME: SIGNIFICANT CHANGE UP
-  CEFOXITIN: SIGNIFICANT CHANGE UP
-  CEFOXITIN: SIGNIFICANT CHANGE UP
-  CEFTRIAXONE: SIGNIFICANT CHANGE UP
-  CEFTRIAXONE: SIGNIFICANT CHANGE UP
-  CEFUROXIME: SIGNIFICANT CHANGE UP
-  CEFUROXIME: SIGNIFICANT CHANGE UP
-  CIPROFLOXACIN: SIGNIFICANT CHANGE UP
-  CIPROFLOXACIN: SIGNIFICANT CHANGE UP
-  ERTAPENEM: SIGNIFICANT CHANGE UP
-  ERTAPENEM: SIGNIFICANT CHANGE UP
-  GENTAMICIN: SIGNIFICANT CHANGE UP
-  GENTAMICIN: SIGNIFICANT CHANGE UP
-  IMIPENEM: SIGNIFICANT CHANGE UP
-  IMIPENEM: SIGNIFICANT CHANGE UP
-  LEVOFLOXACIN: SIGNIFICANT CHANGE UP
-  LEVOFLOXACIN: SIGNIFICANT CHANGE UP
-  MEROPENEM: SIGNIFICANT CHANGE UP
-  MEROPENEM: SIGNIFICANT CHANGE UP
-  NITROFURANTOIN: SIGNIFICANT CHANGE UP
-  NITROFURANTOIN: SIGNIFICANT CHANGE UP
-  PIPERACILLIN/TAZOBACTAM: SIGNIFICANT CHANGE UP
-  PIPERACILLIN/TAZOBACTAM: SIGNIFICANT CHANGE UP
-  TOBRAMYCIN: SIGNIFICANT CHANGE UP
-  TOBRAMYCIN: SIGNIFICANT CHANGE UP
-  TRIMETHOPRIM/SULFAMETHOXAZOLE: SIGNIFICANT CHANGE UP
-  TRIMETHOPRIM/SULFAMETHOXAZOLE: SIGNIFICANT CHANGE UP
ANION GAP SERPL CALC-SCNC: 8 MMOL/L — SIGNIFICANT CHANGE UP (ref 5–17)
BASOPHILS # BLD AUTO: 0.01 K/UL — SIGNIFICANT CHANGE UP (ref 0–0.2)
BASOPHILS NFR BLD AUTO: 0.1 % — SIGNIFICANT CHANGE UP (ref 0–2)
BUN SERPL-MCNC: 24 MG/DL — HIGH (ref 7–23)
CALCIUM SERPL-MCNC: 9.2 MG/DL — SIGNIFICANT CHANGE UP (ref 8.4–10.5)
CHLORIDE SERPL-SCNC: 110 MMOL/L — HIGH (ref 96–108)
CO2 SERPL-SCNC: 23 MMOL/L — SIGNIFICANT CHANGE UP (ref 22–31)
CREAT SERPL-MCNC: 0.66 MG/DL — SIGNIFICANT CHANGE UP (ref 0.5–1.3)
CULTURE RESULTS: SIGNIFICANT CHANGE UP
EGFR: 86 ML/MIN/1.73M2 — SIGNIFICANT CHANGE UP
EOSINOPHIL # BLD AUTO: 0 K/UL — SIGNIFICANT CHANGE UP (ref 0–0.5)
EOSINOPHIL NFR BLD AUTO: 0 % — SIGNIFICANT CHANGE UP (ref 0–6)
GLUCOSE BLDC GLUCOMTR-MCNC: 111 MG/DL — HIGH (ref 70–99)
GLUCOSE BLDC GLUCOMTR-MCNC: 152 MG/DL — HIGH (ref 70–99)
GLUCOSE SERPL-MCNC: 104 MG/DL — HIGH (ref 70–99)
HCT VFR BLD CALC: 30.3 % — LOW (ref 34.5–45)
HGB BLD-MCNC: 9 G/DL — LOW (ref 11.5–15.5)
IMM GRANULOCYTES NFR BLD AUTO: 1 % — HIGH (ref 0–0.9)
LYMPHOCYTES # BLD AUTO: 0.68 K/UL — LOW (ref 1–3.3)
LYMPHOCYTES # BLD AUTO: 9.5 % — LOW (ref 13–44)
MAGNESIUM SERPL-MCNC: 1.9 MG/DL — SIGNIFICANT CHANGE UP (ref 1.6–2.6)
MCHC RBC-ENTMCNC: 21.4 PG — LOW (ref 27–34)
MCHC RBC-ENTMCNC: 29.7 GM/DL — LOW (ref 32–36)
MCV RBC AUTO: 72 FL — LOW (ref 80–100)
METHOD TYPE: SIGNIFICANT CHANGE UP
METHOD TYPE: SIGNIFICANT CHANGE UP
MONOCYTES # BLD AUTO: 0.52 K/UL — SIGNIFICANT CHANGE UP (ref 0–0.9)
MONOCYTES NFR BLD AUTO: 7.3 % — SIGNIFICANT CHANGE UP (ref 2–14)
NEUTROPHILS # BLD AUTO: 5.89 K/UL — SIGNIFICANT CHANGE UP (ref 1.8–7.4)
NEUTROPHILS NFR BLD AUTO: 82.1 % — HIGH (ref 43–77)
NRBC # BLD: 0 /100 WBCS — SIGNIFICANT CHANGE UP (ref 0–0)
ORGANISM # SPEC MICROSCOPIC CNT: SIGNIFICANT CHANGE UP
PHOSPHATE SERPL-MCNC: 3.9 MG/DL — SIGNIFICANT CHANGE UP (ref 2.5–4.5)
PLATELET # BLD AUTO: 278 K/UL — SIGNIFICANT CHANGE UP (ref 150–400)
POTASSIUM SERPL-MCNC: 3.7 MMOL/L — SIGNIFICANT CHANGE UP (ref 3.5–5.3)
POTASSIUM SERPL-SCNC: 3.7 MMOL/L — SIGNIFICANT CHANGE UP (ref 3.5–5.3)
RBC # BLD: 4.21 M/UL — SIGNIFICANT CHANGE UP (ref 3.8–5.2)
RBC # FLD: 17.8 % — HIGH (ref 10.3–14.5)
SODIUM SERPL-SCNC: 141 MMOL/L — SIGNIFICANT CHANGE UP (ref 135–145)
SPECIMEN SOURCE: SIGNIFICANT CHANGE UP
WBC # BLD: 7.17 K/UL — SIGNIFICANT CHANGE UP (ref 3.8–10.5)
WBC # FLD AUTO: 7.17 K/UL — SIGNIFICANT CHANGE UP (ref 3.8–10.5)

## 2023-08-17 PROCEDURE — 93308 TTE F-UP OR LMTD: CPT | Mod: 26

## 2023-08-17 PROCEDURE — 99233 SBSQ HOSP IP/OBS HIGH 50: CPT | Mod: GC

## 2023-08-17 RX ORDER — ACETAMINOPHEN 500 MG
650 TABLET ORAL ONCE
Refills: 0 | Status: COMPLETED | OUTPATIENT
Start: 2023-08-17 | End: 2023-08-17

## 2023-08-17 RX ORDER — METOPROLOL TARTRATE 50 MG
2.5 TABLET ORAL ONCE
Refills: 0 | Status: COMPLETED | OUTPATIENT
Start: 2023-08-17 | End: 2023-08-17

## 2023-08-17 RX ORDER — CEPHALEXIN 500 MG
1 CAPSULE ORAL
Qty: 6 | Refills: 0
Start: 2023-08-17 | End: 2023-08-19

## 2023-08-17 RX ADMIN — ATORVASTATIN CALCIUM 20 MILLIGRAM(S): 80 TABLET, FILM COATED ORAL at 21:30

## 2023-08-17 RX ADMIN — ENOXAPARIN SODIUM 40 MILLIGRAM(S): 100 INJECTION SUBCUTANEOUS at 11:21

## 2023-08-17 RX ADMIN — LISINOPRIL 5 MILLIGRAM(S): 2.5 TABLET ORAL at 06:25

## 2023-08-17 RX ADMIN — Medication 88 MICROGRAM(S): at 06:24

## 2023-08-17 RX ADMIN — Medication 2.5 MILLIGRAM(S): at 14:18

## 2023-08-17 RX ADMIN — Medication 650 MILLIGRAM(S): at 15:31

## 2023-08-17 RX ADMIN — REMDESIVIR 200 MILLIGRAM(S): 5 INJECTION INTRAVENOUS at 14:37

## 2023-08-17 RX ADMIN — FAMOTIDINE 20 MILLIGRAM(S): 10 INJECTION INTRAVENOUS at 11:22

## 2023-08-17 RX ADMIN — Medication 650 MILLIGRAM(S): at 14:37

## 2023-08-17 RX ADMIN — Medication 81 MILLIGRAM(S): at 11:22

## 2023-08-17 RX ADMIN — Medication 2: at 12:52

## 2023-08-17 RX ADMIN — Medication 6 MILLIGRAM(S): at 06:24

## 2023-08-17 RX ADMIN — CEFTRIAXONE 100 MILLIGRAM(S): 500 INJECTION, POWDER, FOR SOLUTION INTRAMUSCULAR; INTRAVENOUS at 18:05

## 2023-08-17 NOTE — PROGRESS NOTE ADULT - PROBLEM SELECTOR PLAN 6
Pt with residual gait disturbance and LLE weakness, ambulates with walker. Home meds: Rosuvastain 20 and aspirin 81  - c/w home med

## 2023-08-17 NOTE — PROGRESS NOTE ADULT - PROBLEM SELECTOR PLAN 4
UA positive, leukocyte esterase, WBC, and bacteria. Pt asymptomatic,  denies dysuria, but experienced worsening incontinence in recent days.   - Flores placed for obstructive symptoms UA positive, leukocyte esterase, WBC, and bacteria. Pt asymptomatic,  denies dysuria, but experienced worsening incontinence in recent days. Flores placed by uro for resistance on straight cath. Passed TOV, but began retaining >300 again.    - F/u uro recs for potential Flores re-insertion

## 2023-08-17 NOTE — PROGRESS NOTE ADULT - PROBLEM SELECTOR PLAN 10
Pt has h/o six abdominal surgeries including hysterectomy. She reports chronic pain for which she takes Amitriptyline 25mg  - f/u EKG and consider restarting if QTc normal and pt has abdominal pain

## 2023-08-17 NOTE — PROGRESS NOTE ADULT - PROBLEM SELECTOR PLAN 1
COVID positive on admission. In the ED, satting 86% now on 2L NC. Pt had COVID previously but was not hospitalized. Pt is unsure if she ever got vaccinated for COVID. CXR: Left basilar focal atelectasis  - C/w Decadron 6mg qd  - supportive care  - Hold remdesivir/ as pt has non-severe COVID  - On 2L NC, wean off O2 as tolerated [resolved] COVID positive on admission. In the ED, satting 86% now on 2L NC. Pt had COVID previously but was not hospitalized. Pt is unsure if she ever got vaccinated for COVID. CXR: Left basilar focal atelectasis  - C/w Decadron 6mg qd  - supportive care  - Hold remdesivir/ as pt has non-severe COVID  - On 2L NC, wean off O2 as tolerated

## 2023-08-17 NOTE — PROGRESS NOTE ADULT - PROBLEM SELECTOR PLAN 2
COVID positive at home and in the ED.   - Assess pt's functional status with ambulatory vitals COVID positive at home and in the ED. Patient showed symptomatic improvement on remdesivir and decadron.    - F/u pt's ambulatory vitals

## 2023-08-17 NOTE — CONSULT NOTE ADULT - SUBJECTIVE AND OBJECTIVE BOX
Patient is a 82y old  Female who presents with a chief complaint of cough (15 Aug 2023 07:33)      HPI:  HPI:    Pt is a 83 y/o F w/hx CVA (2015 w/ residual gait disturbance and LLE weakness, ambulates with walker), HTN, HLD, hypothyroidism, p/w cough, severe fatigue/malaise and poor appetite/reduced PO intake for 3 days with night sweats, no documented fever. Cough is productive with some thick sputum. Denies CP/SOB. No abd pain. Normal stooling. No urinary sx. + home COVID test today and + known contact of son. Pt is unclear whether she's been vaccinated against COVID but has had COVID 1x prior, which pt states was a mild case and did not require hospitalization. No hx lung disease, inhaler use, smoking, or prior O2 requirement. Denies LE pain/swelling.  While in the ED, pt developed a tachy narrow-complex arrhythmia with a rate in the 170s for approximately 1 minute that broke with pt bearing down. Unable to capture on EKG. Repeat EKG shows pt back in normal sinus rhythm. Pt has no known hx of arrhythmia or cardiovascular disease beyond HTN. However, pt does indicate that from time to time she will get a strange chest discomfort (often at rest) with a feeling of lightheadedness that will last for 1-2min for approximately the last 6 months.    In the ED:  vitals: T 98.4, , BP 94/69, RR 19, O2 86% on RA  labs: Pro-, lactate 2.1  UA positive  COVID positive  Imaging: EKG: tachy narrow complex arrhythmia with rate in the 170s for approximately 1 minute that broke with pt doing valsava manuver. Repeat EKG showing NSR  CXR: Left basilar focal atelectasis  Intervention: Acetaminophen 975mg, tessalon perle 100mg, dexamethasone 6mg, 1.75L NS     (15 Aug 2023 05:31)    PAST MEDICAL & SURGICAL HISTORY:  Hypothyroidism      Hypertension      Hyperlipidemia      CVA (cerebrovascular accident)      H/O abdominal surgery      H/O: hysterectomy        MEDICATIONS  (STANDING):  aspirin  chewable 81 milliGRAM(s) Oral daily  atorvastatin 20 milliGRAM(s) Oral at bedtime  dexAMETHasone     Tablet 6 milliGRAM(s) Oral daily  enoxaparin Injectable 40 milliGRAM(s) SubCutaneous every 24 hours  famotidine    Tablet 20 milliGRAM(s) Oral daily  levothyroxine 88 MICROGram(s) Oral every 24 hours  lisinopril 5 milliGRAM(s) Oral every 24 hours    MEDICATIONS  (PRN):             Home Living Status :  lives with her son in an elevator accessible apartment building          -  Prior Home Care Services :  none        Baseline Functional Level Prior to Admission :             - ADL's/ IADL's :  independent          - Ambulatory status prior to admission :   walked with no assistive devices          FAMILY HISTORY:      CBC Full  -  ( 15 Aug 2023 12:10 )  WBC Count : 3.64 K/uL  RBC Count : 4.43 M/uL  Hemoglobin : 9.5 g/dL  Hematocrit : 33.1 %  Platelet Count - Automated : 291 K/uL  Mean Cell Volume : 74.7 fl  Mean Cell Hemoglobin : 21.4 pg  Mean Cell Hemoglobin Concentration : 28.7 gm/dL  Auto Neutrophil # : x  Auto Lymphocyte # : x  Auto Monocyte # : x  Auto Eosinophil # : x  Auto Basophil # : x  Auto Neutrophil % : x  Auto Lymphocyte % : x  Auto Monocyte % : x  Auto Eosinophil % : x  Auto Basophil % : x      -    136  |  101  |  14  ----------------------------<  114<H>  3.4<L>   |  22  |  1.10    Ca    9.3      14 Aug 2023 19:38    TPro  7.9  /  Alb  3.3<L>  /  TBili  0.3  /  DBili  x   /  AST  22  /  ALT  14  /  AlkPhos  51  08-14      Urinalysis Basic - ( 14 Aug 2023 19:38 )    Color: Yellow / Appearance: Cloudy / S.010 / pH: x  Gluc: 114 mg/dL / Ketone: Trace mg/dL  / Bili: Negative / Urobili: 1.0 mg/dL   Blood: x / Protein: 30 mg/dL / Nitrite: Negative   Leuk Esterase: Large / RBC: 3 /HPF / WBC >30 /HPF   Sq Epi: x / Non Sq Epi: x / Bacteria: Too Numerous to count /HPF        Radiology :     < from: Xray Chest 1 View AP/PA (23 @ 20:23) >    ACC: [id number] EXAM:  XR CHEST AP OR PA 1V   ORDERED BY: DAVIDA DELACRUZ     PROCEDURE DATE:  2023          INTERPRETATION:  Clinical history reason for exam: Sepsis.    Frontal chest.    No comparison.    Findings/  impression: Left basilar focal atelectasis. Heart size within normal   limits, thoracic aortic calcification. Unremarkable mediastinum. Dextro   scoliosis.              Review of Systems : per HPI               Vital Signs Last 24 Hrs  T(C): 36.8 (15 Aug 2023 05:31), Max: 36.9 (14 Aug 2023 19:21)  T(F): 98.3 (15 Aug 2023 05:31), Max: 98.5 (15 Aug 2023 04:12)  HR: 70 (15 Aug 2023 12:29) (67 - 100)  BP: 139/61 (15 Aug 2023 12:29) (94/69 - 161/66)  BP(mean): 88 (15 Aug 2023 12:29) (75 - 95)  RR: 20 (15 Aug 2023 12:29) (16 - 37)  SpO2: 97% (15 Aug 2023 12:29) (86% - 100%)    Parameters below as of 15 Aug 2023 12:29  Patient On (Oxygen Delivery Method): nasal cannula  O2 Flow (L/min): 4          Physical Exam :  on [employer] COVID isolation ,  82 y o woman lying comfortably in semi Quinn's position , awake , alert , no acute complaints , feels tired     Head : normocephalic , atraumatic    Eyes : PERRLA , EOMI , no nystagmus , sclera anicteric    ENT / FACE : neg nasal discharge , uvula midline , no oropharyngeal erythema / exudate    Neck : supple , negative JVD , negative carotid bruits , no thyromegaly    Chest : coarse bs L fields     Cardiovascular : regular rate and rhythm , neg murmurs / rubs / gallops    Abdomen : soft , non distended , non tender to palpation in all 4 quadrants ,  normal bowel sounds     Extremities : WWP , neg cyanosis /clubbing / edema     Neurologic Exam :    Alert and oriented to person , place , date/year     Motor Exam:          Right UE:               no focal weakness ,  > 3+/5 throughout     Left UE:                 no focal weakness ,  > 3+/5 throughout         Right LE:    no focal weakness ,  > 3+/5 throughout        Left LE:    no focal weakness ,  > 3+/5 throughout         Sensation :         intact to light touch x 4 extremities                            no neglect or extinction on double simultaneous testing      DTR :     biceps/brachioradialis : equal                      patella/ankle : equal     neg Babinski       Coordination :      Finger to Nose :  neg dysmetria bilaterally       Gait :  not tested          PM&R Impression :         - deconditioned    - mild residual LLE weakness , h/o CVA         Recommendations / Plan :           1) Physical / Occupational therapy focusing on therapeutic exercises , equipment evaluation , bed mobility/transfer out of bed evaluation , progressive ambulation with assistive devices prn .    2) Current disposition plan recommendation  :    d/c home with home physical therapy            
Patient is a 82y old  Female who presents with a chief complaint of cough (17 Aug 2023 07:00)    HPI:    Pt is a 81 y/o F w/hx CVA (2015 w/ residual gait disturbance and LLE weakness, ambulates with walker), HTN, HLD, hypothyroidism, p/w cough, severe fatigue/malaise and poor appetite/reduced PO intake for 3 days with night sweats, no documented fever. Cough is productive with some thick sputum. Denies CP/SOB. No abd pain. Normal stooling. No urinary sx. + home COVID test today and + known contact of son. Pt is unclear whether she's been vaccinated against COVID but has had COVID 1x prior, which pt states was a mild case and did not require hospitalization. No hx lung disease, inhaler use, smoking, or prior O2 requirement. Denies LE pain/swelling.  While in the ED, pt developed a tachy narrow-complex arrhythmia with a rate in the 170s for approximately 1 minute that broke with pt bearing down. Unable to capture on EKG. Repeat EKG shows pt back in normal sinus rhythm. Pt has no known hx of arrhythmia or cardiovascular disease beyond HTN. However, pt does indicate that from time to time she will get a strange chest discomfort (often at rest) with a feeling of lightheadedness that will last for 1-2min for approximately the last 6 months.    In the ED:  vitals: T 98.4, , BP 94/69, RR 19, O2 86% on RA  labs: Pro-, lactate 2.1  UA positive  COVID positive  Imaging: EKG: tachy narrow complex arrhythmia with rate in the 170s for approximately 1 minute that broke with pt doing valsava manuver. Repeat EKG showing NSR  CXR: Left basilar focal atelectasis  Intervention: Acetaminophen 975mg, tessalon perle 100mg, dexamethasone 6mg, 1.75L NS     (15 Aug 2023 05:31)    : As stated above. Urology consulted for urinary retention. During hospitalization patient required guerrero placement on 8/15 for elevated bladder scans, guerrero removed 08/16 and failed TOV requiring CIC x1. Patient states since she was straight cathed she had one episode of urinary incontinence. States she did not feel the urge to urinate but noted the bedding to be wet. Prior to CIC she did have the urge to urinate but could not start a stream. Currently does not have any acute complaints. States she does not feel her bladder is full right now and does not have the urge to urinate. Admits to dysuria when voiding yesterday. States she had a bowel movement during this admission. States at home she ambulates with a walker and can ambulate to the bathroom. States since being in the hospital she has only ambulated a few times with PT. On admission patient noted to have UTI. Admits to history of UTI 1 year ago.       Vital Signs Last 24 Hrs  T(C): 36.6 (17 Aug 2023 06:10), Max: 36.8 (16 Aug 2023 22:47)  T(F): 97.9 (17 Aug 2023 06:10), Max: 98.2 (16 Aug 2023 22:47)  HR: 67 (17 Aug 2023 06:10) (67 - 91)  BP: 149/83 (17 Aug 2023 06:10) (129/60 - 149/83)  BP(mean): --  RR: 18 (17 Aug 2023 06:10) (17 - 20)  SpO2: 94% (17 Aug 2023 06:10) (90% - 95%)    Parameters below as of 17 Aug 2023 06:10  Patient On (Oxygen Delivery Method): room air      I&O's Summary    17 Aug 2023 07:01  -  17 Aug 2023 12:06  --------------------------------------------------------  IN: 0 mL / OUT: 500 mL / NET: -500 mL        PE:  Gen: alert and oriented. no acute distress   Abd: soft, non-tender, non-distended   : no right CVAT, no left CVAT      LABS:                        9.0    7.17  )-----------( 278      ( 17 Aug 2023 08:36 )             30.3     08-17    141  |  110<H>  |  24<H>  ----------------------------<  104<H>  3.7   |  23  |  0.66    Ca    9.2      17 Aug 2023 08:38  Phos  3.9     08-17  Mg     1.9     08-17    TPro  5.6<L>  /  Alb  3.0<L>  /  TBili  <0.2  /  DBili  x   /  AST  16  /  ALT  7<L>  /  AlkPhos  42  08-16      Cultures  Culture Results:   No growth at 48 Hours (08-14 @ 19:38)  Culture Results:   No growth at 48 Hours (08-14 @ 19:38)  Culture Results:   >100,000 CFU/ml Escherichia coli  Multiple Morphological Strains (08-14 @ 19:38)

## 2023-08-17 NOTE — PROGRESS NOTE ADULT - SUBJECTIVE AND OBJECTIVE BOX
***INCOMPLETE NOTE***    INTERVAL EVENTS:   No acute events overnight.    SUBJECTIVE / INTERVAL HPI:   Patient seen and examined at bedside.     ROS: negative unless otherwise stated above.    VITAL SIGNS:  Vital Signs Last 24 Hrs  T(C): 36.6 (17 Aug 2023 06:10), Max: 36.8 (16 Aug 2023 22:47)  T(F): 97.9 (17 Aug 2023 06:10), Max: 98.2 (16 Aug 2023 22:47)  HR: 67 (17 Aug 2023 06:10) (65 - 91)  BP: 149/83 (17 Aug 2023 06:10) (129/60 - 149/83)  BP(mean): --  RR: 18 (17 Aug 2023 06:10) (17 - 20)  SpO2: 94% (17 Aug 2023 06:10) (90% - 95%)    Parameters below as of 17 Aug 2023 06:10  Patient On (Oxygen Delivery Method): room air            MEDICATIONS:  MEDICATIONS  (STANDING):  aspirin  chewable 81 milliGRAM(s) Oral daily  atorvastatin 20 milliGRAM(s) Oral at bedtime  cefTRIAXone   IVPB 1000 milliGRAM(s) IV Intermittent every 24 hours  dexAMETHasone     Tablet 6 milliGRAM(s) Oral daily  dextrose 5%. 1000 milliLiter(s) (50 mL/Hr) IV Continuous <Continuous>  dextrose 5%. 1000 milliLiter(s) (100 mL/Hr) IV Continuous <Continuous>  dextrose 50% Injectable 25 Gram(s) IV Push once  dextrose 50% Injectable 12.5 Gram(s) IV Push once  dextrose 50% Injectable 25 Gram(s) IV Push once  enoxaparin Injectable 40 milliGRAM(s) SubCutaneous every 24 hours  famotidine    Tablet 20 milliGRAM(s) Oral daily  glucagon  Injectable 1 milliGRAM(s) IntraMuscular once  insulin lispro (ADMELOG) corrective regimen sliding scale   SubCutaneous three times a day before meals  levothyroxine 88 MICROGram(s) Oral every 24 hours  lisinopril 5 milliGRAM(s) Oral every 24 hours  polyethylene glycol 3350 17 Gram(s) Oral daily  remdesivir  IVPB   IV Intermittent   remdesivir  IVPB 100 milliGRAM(s) IV Intermittent every 24 hours  senna 2 Tablet(s) Oral at bedtime    MEDICATIONS  (PRN):  dextrose Oral Gel 15 Gram(s) Oral once PRN Blood Glucose LESS THAN 70 milliGRAM(s)/deciliter      ALLERGIES:  Allergies    No Known Allergies    Intolerances        LABS:                        9.3    4.38  )-----------( 300      ( 16 Aug 2023 14:00 )             32.0     08-16    140  |  110<H>  |  14  ----------------------------<  113<H>  3.8   |  22  |  0.59    Ca    8.9      16 Aug 2023 05:30  Phos  4.2     08-16  Mg     2.0     08-16    TPro  5.6<L>  /  Alb  3.0<L>  /  TBili  <0.2  /  DBili  x   /  AST  16  /  ALT  7<L>  /  AlkPhos  42  08-16      Urinalysis Basic - ( 16 Aug 2023 05:30 )    Color: x / Appearance: x / SG: x / pH: x  Gluc: 113 mg/dL / Ketone: x  / Bili: x / Urobili: x   Blood: x / Protein: x / Nitrite: x   Leuk Esterase: x / RBC: x / WBC x   Sq Epi: x / Non Sq Epi: x / Bacteria: x      CAPILLARY BLOOD GLUCOSE      POCT Blood Glucose.: 138 mg/dL (16 Aug 2023 12:51)      RADIOLOGY & ADDITIONAL TESTS: Reviewed. INTERVAL EVENTS:   No acute events overnight.    SUBJECTIVE / INTERVAL HPI:   Patient seen and examined at bedside. Patient feels more energetic than yesterday. No acute complaints at this time.     ROS: negative unless otherwise stated above.    VITAL SIGNS:  Vital Signs Last 24 Hrs  T(C): 36.6 (17 Aug 2023 06:10), Max: 36.8 (16 Aug 2023 22:47)  T(F): 97.9 (17 Aug 2023 06:10), Max: 98.2 (16 Aug 2023 22:47)  HR: 67 (17 Aug 2023 06:10) (65 - 91)  BP: 149/83 (17 Aug 2023 06:10) (129/60 - 149/83)  BP(mean): --  RR: 18 (17 Aug 2023 06:10) (17 - 20)  SpO2: 94% (17 Aug 2023 06:10) (90% - 95%)    Parameters below as of 17 Aug 2023 06:10  Patient On (Oxygen Delivery Method): room air            MEDICATIONS:  MEDICATIONS  (STANDING):  aspirin  chewable 81 milliGRAM(s) Oral daily  atorvastatin 20 milliGRAM(s) Oral at bedtime  cefTRIAXone   IVPB 1000 milliGRAM(s) IV Intermittent every 24 hours  dexAMETHasone     Tablet 6 milliGRAM(s) Oral daily  dextrose 5%. 1000 milliLiter(s) (50 mL/Hr) IV Continuous <Continuous>  dextrose 5%. 1000 milliLiter(s) (100 mL/Hr) IV Continuous <Continuous>  dextrose 50% Injectable 25 Gram(s) IV Push once  dextrose 50% Injectable 12.5 Gram(s) IV Push once  dextrose 50% Injectable 25 Gram(s) IV Push once  enoxaparin Injectable 40 milliGRAM(s) SubCutaneous every 24 hours  famotidine    Tablet 20 milliGRAM(s) Oral daily  glucagon  Injectable 1 milliGRAM(s) IntraMuscular once  insulin lispro (ADMELOG) corrective regimen sliding scale   SubCutaneous three times a day before meals  levothyroxine 88 MICROGram(s) Oral every 24 hours  lisinopril 5 milliGRAM(s) Oral every 24 hours  polyethylene glycol 3350 17 Gram(s) Oral daily  remdesivir  IVPB   IV Intermittent   remdesivir  IVPB 100 milliGRAM(s) IV Intermittent every 24 hours  senna 2 Tablet(s) Oral at bedtime    MEDICATIONS  (PRN):  dextrose Oral Gel 15 Gram(s) Oral once PRN Blood Glucose LESS THAN 70 milliGRAM(s)/deciliter      ALLERGIES:  Allergies    No Known Allergies    Intolerances        LABS:                        9.3    4.38  )-----------( 300      ( 16 Aug 2023 14:00 )             32.0     08-16    140  |  110<H>  |  14  ----------------------------<  113<H>  3.8   |  22  |  0.59    Ca    8.9      16 Aug 2023 05:30  Phos  4.2     08-16  Mg     2.0     08-16    TPro  5.6<L>  /  Alb  3.0<L>  /  TBili  <0.2  /  DBili  x   /  AST  16  /  ALT  7<L>  /  AlkPhos  42  08-16      Urinalysis Basic - ( 16 Aug 2023 05:30 )    Color: x / Appearance: x / SG: x / pH: x  Gluc: 113 mg/dL / Ketone: x  / Bili: x / Urobili: x   Blood: x / Protein: x / Nitrite: x   Leuk Esterase: x / RBC: x / WBC x   Sq Epi: x / Non Sq Epi: x / Bacteria: x      CAPILLARY BLOOD GLUCOSE      POCT Blood Glucose.: 138 mg/dL (16 Aug 2023 12:51)      RADIOLOGY & ADDITIONAL TESTS: Reviewed.

## 2023-08-17 NOTE — PROGRESS NOTE ADULT - SUBJECTIVE AND OBJECTIVE BOX
Physical Medicine and Rehabilitation Progress Note :       Patient is a 82y old  Female who presents with a chief complaint of cough (17 Aug 2023 12:05)      HPI:  HPI:    Pt is a 81 y/o F w/hx CVA (2015 w/ residual gait disturbance and LLE weakness, ambulates with walker), HTN, HLD, hypothyroidism, p/w cough, severe fatigue/malaise and poor appetite/reduced PO intake for 3 days with night sweats, no documented fever. Cough is productive with some thick sputum. Denies CP/SOB. No abd pain. Normal stooling. No urinary sx. + home COVID test today and + known contact of son. Pt is unclear whether she's been vaccinated against COVID but has had COVID 1x prior, which pt states was a mild case and did not require hospitalization. No hx lung disease, inhaler use, smoking, or prior O2 requirement. Denies LE pain/swelling.  While in the ED, pt developed a tachy narrow-complex arrhythmia with a rate in the 170s for approximately 1 minute that broke with pt bearing down. Unable to capture on EKG. Repeat EKG shows pt back in normal sinus rhythm. Pt has no known hx of arrhythmia or cardiovascular disease beyond HTN. However, pt does indicate that from time to time she will get a strange chest discomfort (often at rest) with a feeling of lightheadedness that will last for 1-2min for approximately the last 6 months.    In the ED:  vitals: T 98.4, , BP 94/69, RR 19, O2 86% on RA  labs: Pro-, lactate 2.1  UA positive  COVID positive  Imaging: EKG: tachy narrow complex arrhythmia with rate in the 170s for approximately 1 minute that broke with pt doing valsava manuver. Repeat EKG showing NSR  CXR: Left basilar focal atelectasis  Intervention: Acetaminophen 975mg, tessalon perle 100mg, dexamethasone 6mg, 1.75L NS     (15 Aug 2023 05:31)                            9.0    7.17  )-----------( 278      ( 17 Aug 2023 08:36 )             30.3       08-17    141  |  110<H>  |  24<H>  ----------------------------<  104<H>  3.7   |  23  |  0.66    Ca    9.2      17 Aug 2023 08:38  Phos  3.9     08-17  Mg     1.9     08-17    TPro  5.6<L>  /  Alb  3.0<L>  /  TBili  <0.2  /  DBili  x   /  AST  16  /  ALT  7<L>  /  AlkPhos  42  08-16    Vital Signs Last 24 Hrs  T(C): 36.3 (17 Aug 2023 13:00), Max: 36.8 (16 Aug 2023 22:47)  T(F): 97.4 (17 Aug 2023 13:00), Max: 98.2 (16 Aug 2023 22:47)  HR: 100 (17 Aug 2023 13:00) (67 - 100)  BP: 107/71 (17 Aug 2023 13:00) (107/71 - 149/83)  BP(mean): --  RR: 17 (17 Aug 2023 13:00) (17 - 20)  SpO2: 93% (17 Aug 2023 13:00) (90% - 95%)    Parameters below as of 17 Aug 2023 13:00  Patient On (Oxygen Delivery Method): room air        MEDICATIONS  (STANDING):  aspirin  chewable 81 milliGRAM(s) Oral daily  atorvastatin 20 milliGRAM(s) Oral at bedtime  cefTRIAXone   IVPB 1000 milliGRAM(s) IV Intermittent every 24 hours  dexAMETHasone     Tablet 6 milliGRAM(s) Oral daily  dextrose 5%. 1000 milliLiter(s) (50 mL/Hr) IV Continuous <Continuous>  dextrose 5%. 1000 milliLiter(s) (100 mL/Hr) IV Continuous <Continuous>  dextrose 50% Injectable 25 Gram(s) IV Push once  dextrose 50% Injectable 12.5 Gram(s) IV Push once  dextrose 50% Injectable 25 Gram(s) IV Push once  enoxaparin Injectable 40 milliGRAM(s) SubCutaneous every 24 hours  famotidine    Tablet 20 milliGRAM(s) Oral daily  glucagon  Injectable 1 milliGRAM(s) IntraMuscular once  insulin lispro (ADMELOG) corrective regimen sliding scale   SubCutaneous three times a day before meals  levothyroxine 88 MICROGram(s) Oral every 24 hours  lisinopril 5 milliGRAM(s) Oral every 24 hours  polyethylene glycol 3350 17 Gram(s) Oral daily  remdesivir  IVPB   IV Intermittent   remdesivir  IVPB 100 milliGRAM(s) IV Intermittent every 24 hours  senna 2 Tablet(s) Oral at bedtime    MEDICATIONS  (PRN):  dextrose Oral Gel 15 Gram(s) Oral once PRN Blood Glucose LESS THAN 70 milliGRAM(s)/deciliter       Functional Status Assessment :   8/16/2023    Pain Assessment/Number Scale (0-10) Adult  Presence of Pain: denies pain/discomfort (Rating = 0)  Pain Rating (0-10): Rest: 0 (no pain/absence of nonverbal indicators of pain)  Pain Rating (0-10): Activity: 0 (no pain/absence of nonverbal indicators of pain)    Safety      AM-PAC Functional Assessment: Basic Mobility  Type of Assessment: Daily assessment  Turning from your back to your side while in a flat bed without using bedrails?: 3 = A little assistance  Moving from lying on your back to sitting on the flat side of a flat bed without using bedrails?: 3 = A little assistance  Moving to and from a bed to a chair (including a wheelchair)?: 3 = A little assistance  Standing up from a chair using your arms (e.g. wheelchair or bedside chair)?: 3 = A little assistance  Walking in hospital room?: 3 = A little assistance  Climbing 3-5 steps with a railing?: 3 = A little assistance  Score: 18   Row Comment: Ask the patient "How much help from another person do you currently need? (If the patient hasn't done an activity recently, how much help from another person do you think he/she needs if he/she tried?)    Cognitive/Neuro      Cognitive/Neuro/Behavioral  Cognitive/Neuro/Behavioral [WDL Definition: Alert; opens eyes spontaneously; arouses to voice or touch; oriented x 4; follows commands; speech spontaneous, logical; purposeful motor response; behavior appropriate to situation]: WDL    Language Assistance  Preferred Language to Address Healthcare Preferred Language to Address Healthcare: English    Therapeutic Interventions      Bed Mobility  Bed Mobility Training Rolling/Turning: supervsion  Bed Mobility Training Scooting: supervsion  Bed Mobility Training Bridging: supervsion  Bed Mobility Training Sit-to-Supine: supervsion  Bed Mobility Training Supine-to-Sit: supervsion  Bed Mobility Training Limitations: decreased ability to use legs for bridging/pushing;  decreased strength    Sit-Stand Transfer Training  Transfer Training Sit-to-Stand Transfer: contact guard;  rolling walker  Transfer Training Stand-to-Sit Transfer: contact guard;  rolling walker  Sit-to-Stand Transfer Training Transfer Safety Analysis: decreased weight-shifting ability;  decreased strength    Gait Training  Gait Training: contact guard;  rollator;  20 ft x 2  Gait Analysis: decreased cyndy;  decreased step length;  decreased weight-shifting ability;  decreased strength    Therapeutic Exercise  Therapeutic Exercise Detail: Seated LAQ, Seated ankle pumps               PM&R Impression : as above    Current Disposition Plan Recommendations :       d/c home with home physical therapy  ,  family assist

## 2023-08-17 NOTE — CONSULT NOTE ADULT - PROBLEM SELECTOR RECOMMENDATION 9
- no emergent urologic intervention indicated at this time  - have patient ambulate to restroom when she has urge to urinate. obtain PVR   - if patient has urge and cannot void can bladder scan. if elevated >300cc and patient uncomfortable can place guerrero  - continue antibiotics for UTI   - encourage PO hydration   - bowel regimem  - encourage ambulation with assistance   -  rest of care as per primary team - no emergent urologic intervention indicated at this time  - have patient ambulate to restroom when she has urge to urinate. obtain PVR   - continue antibiotics for UTI   - encourage PO hydration   - bowel regimem  - encourage ambulation with assistance   -  rest of care as per primary team  - can follow up outpatient with Dr. Ventura   - plan was discussed between Dr. Ta and Attending

## 2023-08-17 NOTE — CONSULT NOTE ADULT - ASSESSMENT
81 y/o F w/hx CVA (2015 w/ residual gait disturbance and LLE weakness, ambulates with walker), HTN, HLD, hypothyroidism, p/w cough, severe fatigue/malaise and poor appetite/reduced PO intake for 3 days with night sweats, no documented fever.   Urology consulted for urinary retention. Patient had guerrero, failed TOV and was straight cathed. Patient did have urge prior to [facility]IC but was unable to start stream. Since straight cath patient has had 1 episode of urinary incontinence. Denies history of retention. States she is feeling well. Today WBC 7.17, creatinine 0.66, urine culture from 8/14 E.coli patient on ceftriaxone. 
{\rtf1\vskdnu56748\ansi\pqabgew7171\ftnbj\uc1\deff0  {\fonttbl{\f0 \fnil Segoe UI;}{\f1 \fnil \fcharset0 Segoe UI;}{\f2 \fnil Times New Matthew;}}  {\colortbl ;\dik799\umyln624\xysn452 ;\red0\green0\blue0 ;\red0\green0\uqbf779 ;\red0\green0\blue0 ;}  {\stylesheet{\f0\fs20 Normal;}{\cs1 Default Paragraph Font;}{\cs2\f0\fs16 Line Number;}{\cs3\f2\fs24\ul\cf3 Hyperlink;}}  {\*\revtbl{Unknown;}}  \xeyeqz98433\iiwtmj05856\zaurc1084\gsggn5344\aamoa4556\trplp8797\llnlpxq334\ygjvayw719\nogrowautofit\nhpdcf462\formshade\nofeaturethrottle1\dntblnsbdb\fet4\aendnotes\aftnnrlc\pgbrdrhead\pgbrdrfoot  \sectd\wyvhpe19618\tzjkkc40224\guttersxn0\uyunirhs2218\qiuwqmcc5922\axqgludp1953\ydfyfwkx3348\pvohlmd074\tnuyatm895\sbkpage\pgncont\pgndec  \plain\plain\f0\fs24\ql\plain\f0\fs24\plain\f0\fs20\sllb0419\hich\f0\dbch\f0\loch\f0\fs20 I M\par  \par  82 y o F w/hx CVA (residual gait disturbance and LLE weakness, ambulates with walker), HTN, HLD, hypothyroidism, p/w cough, severe fatigue/malaise likely 2/2 to COVID infection. Pt also developed a tachy narrow complex arrhythmia at [facility] which broke with   valsava maneuver. Pt being admitted to [facility] for closer monitoring of arrythmia and COVID infection.\par  \par  \plain\f1\fs20\ohad3694\hich\f1\dbch\f1\loch\f1\cf2\fs20\ul{\field{\*\fldinst HYPERLINK [id number] }{\fldrslt Problem/Plan - 1:}}\plain\f0\fs20\dbwy5440\hich\f0\dbch\f0\loch\f0\fs20\ql\par  \'b7  {\*\bkmkstart bi10566591427}{\*\bkmkend br74306765399}Problem: {\*\bkmkstart uc90576171108}{\*\bkmkend vs89479885695}Acute respiratory failure with hypoxia. \par  \'b7  {\*\bkmkstart jw70315151208}{\*\bkmkend pi19557376681}Plan: {\*\bkmkstart zw38990062771}{\*\bkmkend at97875471074}COVID positive on admission. In the ED, satting 86% now on 2L NC. Pt had COVID previously but was not hospitalized. Pt is unsure if   she ever got vaccinated for COVID. CXR: Left basilar focal atelectasis\par  - Decadron 6mg qd\par  - supportive care\par  - hold remdesivir/ as pt has non-severe COVID\par  - On 2L NC, wean off O2 as tolerated.\par  \par  \plain\f1\fs20\gmky2712\hich\f1\dbch\f1\loch\f1\cf2\fs20\ul{\field{\*\fldinst HYPERLINK [id number] }{\fldrslt Problem/Plan - 2:}}\plain\f0\fs20\olxn1767\hich\f0\dbch\f0\loch\f0\fs20\ql\par  \'b7  {\*\bkmkstart dn61624612091}{\*\bkmkend pj42957775559}Problem: {\*\bkmkstart fp17539151298}{\*\bkmkend qe64822076749}COVID-19 virus infection. \par  \'b7  {\*\bkmkstart is23200409462}{\*\bkmkend tt10664628273}Plan: {\*\bkmkstart bk15009517613}{\*\bkmkend zd24086041141}COVID positive at home and in the ED\par  -plan as above.\par  \par  \plain\f1\fs20\tznw1175\hich\f1\dbch\f1\loch\f1\cf2\fs20\ul{\field{\*\fldinst HYPERLINK [id number] }{\fldrslt Problem/Plan - 3:}}\plain\f0\fs20\sdih7680\hich\f0\dbch\f0\loch\f0\fs20\ql\par  \'b7  {\*\bkmkstart if97051835071}{\*\bkmkend qd44396629420}Problem: {\*\bkmkstart fd90267477351}{\*\bkmkend sy24331522006}Narrow complex tachycardia. \par  \'b7  {\*\bkmkstart hx03922686815}{\*\bkmkend mz6291938}Plan: {\*\bkmkstart lx26253774912}{\*\bkmkend ov89900986355}i/s/o COVID infection and hypoxia. EKG showing tachy narrow complex arrhythmia with rate in the 170s for approximately 1 minute that   broke with pt doing valsava manuver. Repeat EKG showing NSR.\par  - repeat EKG\par  - EP consult in the AM\par  - f/u TTE.\plain\f1\fs20\yujb5642\hich\f1\dbch\f1\loch\f1\cf2\fs20\strike\plain\f0\fs20\xxic4422\hich\f0\dbch\f0\loch\f0\fs20\par  \par  \plain\f1\fs20\fszv4302\hich\f1\dbch\f1\loch\f1\cf2\fs20\ul{\field{\*\fldinst HYPERLINK [id number] }{\fldrslt Problem/Plan - 4:}}\plain\f0\fs20\nvnb3064\hich\f0\dbch\f0\loch\f0\fs20\ql\par  \'b7  {\*\bkmkstart kg78325242950}{\*\bkmkend vx65815788299}Problem: {\*\bkmkstart iv62541870780}{\*\bkmkend pv18952965711}Asymptomatic bacteriuria.\plain\f1\fs20\kows9583\hich\f1\dbch\f1\loch\f1\cf2\fs20\strike\plain\f0\fs20\pqho4188\hich\f0\dbch\f0\loch\f0\fs20\par  \'b7  {\*\bkmkstart jl65536362624}{\*\bkmkend wp53685128126}Plan: {\*\bkmkstart dk82124843576}{\*\bkmkend zp53680861893}UA positive, leukocyte esterase, WBC, and bacteria. Pt asymptomatic,  denies dysuria or increase in urinary frequency.\par  - continue to monitor.\plain\f1\fs20\zbdi9918\hich\f1\dbch\f1\loch\f1\cf2\fs20\strike\plain\f0\fs20\hssl2370\hich\f0\dbch\f0\loch\f0\fs20\par  \par  \plain\f1\fs20\iozf0084\hich\f1\dbch\f1\loch\f1\cf2\fs20\ul{\field{\*\fldinst HYPERLINK [id number] }{\fldrslt Problem/Plan - 5:}}\plain\f0\fs20\aefp4333\hich\f0\dbch\f0\loch\f0\fs20\ql\par  \'b7  {\*\bkmkstart ry85360608064}{\*\bkmkend gu58984682895}Problem: {\*\bkmkstart xu74555586087}{\*\bkmkend qk70356952347}Microcytic anemia.\plain\f1\fs20\bevw4304\hich\f1\dbch\f1\loch\f1\cf2\fs20\strike\plain\f0\fs20\yhxe6958\hich\f0\dbch\f0\loch\f0\fs20\par  \'b7  {\*\bkmkstart pp44893781829}{\*\bkmkend wp70594560991}Plan: {\*\bkmkstart ar95526712744}{\*\bkmkend ug21684302545}Hgb 9.7 on admission. unknown baseline.\par  - f/u iron panel\par  - transfuse if <7\par  - active T&S.\plain\f1\fs20\obsi9994\hich\f1\dbch\f1\loch\f1\cf2\fs20\strike\plain\f0\fs20\wjjm3491\hich\f0\dbch\f0\loch\f0\fs20\par  \par  \plain\f1\fs20\diez2490\hich\f1\dbch\f1\loch\f1\cf2\fs20\ul{\field{\*\fldinst HYPERLINK [id number] }{\fldrslt Problem/Plan - 6:}}\plain\f0\fs20\sulq9574\hich\f0\dbch\f0\loch\f0\fs20\ql\par  \'b7  {\*\bkmkstart ci93879508232}{\*\bkmkend ox79275763559}Problem: {\*\bkmkstart kf29785762585}{\*\bkmkend ok52373065223}CVA (cerebrovascular accident). \par  \'b7  {\*\bkmkstart qr03790038697}{\*\bkmkend nf27203946397}Plan: {\*\bkmkstart bh02173544438}{\*\bkmkend ne99907208428}Pt with residual gait disturbance and LLE weakness, ambulates with walker. Home meds: Rosuvastain 20 and aspirin 81\par  - c/w home med.\par  \par  \plain\f1\fs20\nche8016\hich\f1\dbch\f1\loch\f1\cf2\fs20\ul{\field{\*\fldinst HYPERLINK [id number] }{\fldrslt Problem/Plan - 7:}}\plain\f0\fs20\rszt1674\hich\f0\dbch\f0\loch\f0\fs20\ql\par  \'b7  {\*\bkmkstart op98282263624}{\*\bkmkend zl90585593014}Problem: {\*\bkmkstart zn62825143171}{\*\bkmkend jc20760865151}Hypertension. \par  \'b7  {\*\bkmkstart ea90430927641}{\*\bkmkend dt86153226097}Plan: {\*\bkmkstart is47673991276}{\*\bkmkend on79139887109}h/o HTN. Home meds: lisinopril 5mg qd\par  - c/w home med.\par  \par  \plain\f1\fs20\iibq8898\hich\f1\dbch\f1\loch\f1\cf2\fs20\ul{\field{\*\fldinst HYPERLINK [id number] }{\fldrslt Problem/Plan - 8:}}\plain\f0\fs20\brvu1873\hich\f0\dbch\f0\loch\f0\fs20\ql\par  \'b7  {\*\bkmkstart yx60739824317}{\*\bkmkend gi15734031000}Problem: {\*\bkmkstart fy02486082291}{\*\bkmkend ui71845783976}Hyperlipidemia. \par  \'b7  {\*\bkmkstart td20323401022}{\*\bkmkend fh97181209629}Plan: {\*\bkmkstart sj98478035172}{\*\bkmkend wv33444149139}H/o hyperlipidemia. Home meds: rosuvastatin 20mg\par  - c/w home med.\par  \par  \plain\f1\fs20\cbmx2668\hich\f1\dbch\f1\loch\f1\cf2\fs20\ul{\field{\*\fldinst HYPERLINK [id number] }{\fldrslt Problem/Plan - 9:}}\plain\f0\fs20\utmw0439\hich\f0\dbch\f0\loch\f0\fs20\ql\par  \'b7  {\*\bkmkstart dk44870645342}{\*\bkmkend uz58573278889}Problem: {\*\bkmkstart da94023169463}{\*\bkmkend gq38313027696}Hypothyroidism. \par  \'b7  {\*\bkmkstart tf69714395762}{\*\bkmkend zk51419778189}Plan: {\*\bkmkstart og73882152089}{\*\bkmkend ic50488802388}H/o hypothyroidism. Home meds: Levothyroxine 88mg qd\par  - c/w home med.\par  \par  \plain\f1\fs20\dwgp4378\hich\f1\dbch\f1\loch\f1\cf2\fs20\ul{\field{\*\fldinst HYPERLINK [id number] }{\fldrslt Problem/Plan - 10:}}\plain\f0\fs20\nyez9683\hich\f0\dbch\f0\loch\f0\fs20\ql\par  \'b7  {\*\bkmkstart pa41086764806}{\*\bkmkend uh37374864362}Problem: {\*\bkmkstart ic16892001677}{\*\bkmkend it28011162754}Abdominal pain. \par  \'b7  {\*\bkmkstart zk75576711731}{\*\bkmkend uu58165068413}Plan; {\*\bkmkstart or10079987793}{\*\bkmkend xz09556199084}Pt has h/o six abdominal surgeries including hysterectomy. She reports chronic pain for which she takes Amitriptyline 25mg\par  - f/u EKG and consider restarting if QTc normal and pt has abdominal pain.\par  \par  \plain\f1\fs20\tvyx2980\hich\f1\dbch\f1\loch\f1\cf2\fs20\ul{\field{\*\fldinst HYPERLINK [id number] }{\fldrslt Problem/Plan - 11:}}\plain\f0\fs20\jjrq3315\hich\f0\dbch\f0\loch\f0\fs20\ql\par  \'b7  {\*\bkmkstart sl878345626224}{\*\bkmkend qa648050451333}Problem: {\*\bkmkstart ao990621617262}{\*\bkmkend qh666085064893}Prophylactic measure. \par  \'b7  {\*\bkmkstart lt622813494393}{\*\bkmkend eg671735058690}Plan: {\*\bkmkstart cs358756405223}{\*\bkmkend xc821367329695}Fluids: s/p 1.75 L\par  Electrolytes: Mg >2, K >4\par  Nutrition: consistent carb diet\par  Prophylaxis: lovenox\par  Activity: fall risk\par  GI: none\par  C: FC\par  Dispo: 7lach.\par  \par  \par  }

## 2023-08-17 NOTE — PROGRESS NOTE ADULT - ASSESSMENT
I M    82 y o F w/hx CVA (residual gait disturbance and LLE weakness, ambulates with walker), HTN, HLD, hypothyroidism, p/w cough, severe fatigue/malaise likely 2/2 to COVID infection. Pt also developed a tachy narrow complex arrhythmia at ED which broke with valsava maneuver. Pt being admitted to [facility] for closer monitoring of arrythmia and COVID infection.    Problem/Plan - 1:  ·  Problem: Flores catheter status.   ·  Plan: Flores placed for retention after resistance on straight cath. Removed per urology. Passed TOV attempted prior to discharge on 8/16, f/u bladder scan showed 114mL retention.     • Straight cath in AM.    Problem/Plan - 2:  ·  Problem: Acute respiratory failure with hypoxia.   ·  Plan: COVID positive on admission. In the ED, satting 86% now on 2L NC. Pt had COVID previously but was not hospitalized. Pt is unsure if she ever got vaccinated for COVID. CXR: Left basilar focal atelectasis  - C/w Decadron 6mg qd  - supportive care  - Hold remdesivir/ as pt has non-severe COVID  - On 2L NC, wean off O2 as tolerated.    Problem/Plan - 3:  ·  Problem: COVID-19 virus infection.   ·  Plan: COVID positive at home and in the ED.   - Assess pt's functional status with ambulatory vitals.    Problem/Plan - 4:  ·  Problem: Narrow complex tachycardia.   ·  Plan: i/s/o COVID infection and hypoxia. EKG showing tachy narrow complex arrhythmia with rate in the 170s for approximately 1 minute that broke with pt doing valsava manuver. Repeat EKG showing NSR.  - Patient w/o arrhythmia since initial episode.    Problem/Plan - 5:  ·  Problem: Asymptomatic bacteriuria.   ·  Plan: UA positive, leukocyte esterase, WBC, and bacteria. Pt asymptomatic,  denies dysuria, but experienced worsening incontinence in recent days.   - Flores placed for obstructive symptoms.    Problem/Plan - 6:  ·  Problem: Microcytic anemia.   ·  Plan: Hgb 9.7 on admission. unknown baseline.  - f/u iron panel  - transfuse if <7  - active T&S.    Problem/Plan - 7:  ·  Problem: CVA (cerebrovascular accident).   ·  Plan: Pt with residual gait disturbance and LLE weakness, ambulates with walker. Home meds: Rosuvastain 20 and aspirin 81  - c/w home med.    Problem/Plan - 8:  ·  Problem: Hypertension.   ·  Plan: h/o HTN. Home meds: lisinopril 5mg qd  - c/w home med.    Problem/Plan - 9:  ·  Problem: Hyperlipidemia.   ·  Plan: H/o hyperlipidemia. Home meds: rosuvastatin 20mg  - c/w home med.    Problem/Plan - 10:  ·  Problem: Hypothyroidism.   ·  Plan; H/o hypothyroidism. Home meds: Levothyroxine 88mg qd  - c/w home med.    Problem/Plan - 11:  ·  Problem: Abdominal pain.   ·  Plan: Pt has h/o six abdominal surgeries including hysterectomy. She reports chronic pain for which she takes Amitriptyline 25mg  - f/u EKG and consider restarting if QTc normal and pt has abdominal pain.    Problem/Plan - 12:  ·  Problem: Prophylactic measure.   ·  Plan: Fluids: s/p 1.75 L  Electrolytes: Mg >2, K >4  Nutrition: consistent carb diet  Prophylaxis: lovenox  Activity: fall risk  GI: none  C: FC

## 2023-08-17 NOTE — PROGRESS NOTE ADULT - PROBLEM SELECTOR PLAN 3
i/s/o COVID infection and hypoxia. EKG showing tachy narrow complex arrhythmia with rate in the 170s for approximately 1 minute that broke with pt doing valsava manuver. Repeat EKG showing NSR.  - Patient w/o arrhythmia since initial episode.

## 2023-08-17 NOTE — PROGRESS NOTE ADULT - ASSESSMENT
Pt is a 83 y/o F w/hx CVA (residual gait disturbance and LLE weakness, ambulates with walker), HTN, HLD, hypothyroidism, p/w cough, severe fatigue/malaise likely 2/2 to COVID infection. Pt also developed a tachy narrow complex arrhythmia at ED which broke with valsava maneuver. Pt being admitted to [facility] for closer monitoring of arrythmia and COVID infection.

## 2023-08-18 ENCOUNTER — TRANSCRIPTION ENCOUNTER (OUTPATIENT)
Age: 83
End: 2023-08-18

## 2023-08-18 VITALS — DIASTOLIC BLOOD PRESSURE: 65 MMHG | SYSTOLIC BLOOD PRESSURE: 99 MMHG

## 2023-08-18 LAB
ANION GAP SERPL CALC-SCNC: 9 MMOL/L — SIGNIFICANT CHANGE UP (ref 5–17)
BUN SERPL-MCNC: 19 MG/DL — SIGNIFICANT CHANGE UP (ref 7–23)
CALCIUM SERPL-MCNC: 8.8 MG/DL — SIGNIFICANT CHANGE UP (ref 8.4–10.5)
CHLORIDE SERPL-SCNC: 111 MMOL/L — HIGH (ref 96–108)
CO2 SERPL-SCNC: 22 MMOL/L — SIGNIFICANT CHANGE UP (ref 22–31)
CREAT SERPL-MCNC: 0.62 MG/DL — SIGNIFICANT CHANGE UP (ref 0.5–1.3)
EGFR: 88 ML/MIN/1.73M2 — SIGNIFICANT CHANGE UP
GLUCOSE BLDC GLUCOMTR-MCNC: 112 MG/DL — HIGH (ref 70–99)
GLUCOSE BLDC GLUCOMTR-MCNC: 96 MG/DL — SIGNIFICANT CHANGE UP (ref 70–99)
GLUCOSE SERPL-MCNC: 81 MG/DL — SIGNIFICANT CHANGE UP (ref 70–99)
HCT VFR BLD CALC: 30.8 % — LOW (ref 34.5–45)
HGB BLD-MCNC: 8.8 G/DL — LOW (ref 11.5–15.5)
MAGNESIUM SERPL-MCNC: 2.1 MG/DL — SIGNIFICANT CHANGE UP (ref 1.6–2.6)
MCHC RBC-ENTMCNC: 21 PG — LOW (ref 27–34)
MCHC RBC-ENTMCNC: 28.6 GM/DL — LOW (ref 32–36)
MCV RBC AUTO: 73.3 FL — LOW (ref 80–100)
NRBC # BLD: 0 /100 WBCS — SIGNIFICANT CHANGE UP (ref 0–0)
PHOSPHATE SERPL-MCNC: 3.8 MG/DL — SIGNIFICANT CHANGE UP (ref 2.5–4.5)
PLATELET # BLD AUTO: 297 K/UL — SIGNIFICANT CHANGE UP (ref 150–400)
POTASSIUM SERPL-MCNC: 3.6 MMOL/L — SIGNIFICANT CHANGE UP (ref 3.5–5.3)
POTASSIUM SERPL-SCNC: 3.6 MMOL/L — SIGNIFICANT CHANGE UP (ref 3.5–5.3)
RBC # BLD: 4.2 M/UL — SIGNIFICANT CHANGE UP (ref 3.8–5.2)
RBC # FLD: 18.2 % — HIGH (ref 10.3–14.5)
SODIUM SERPL-SCNC: 142 MMOL/L — SIGNIFICANT CHANGE UP (ref 135–145)
WBC # BLD: 5.7 K/UL — SIGNIFICANT CHANGE UP (ref 3.8–10.5)
WBC # FLD AUTO: 5.7 K/UL — SIGNIFICANT CHANGE UP (ref 3.8–10.5)

## 2023-08-18 PROCEDURE — 99239 HOSP IP/OBS DSCHRG MGMT >30: CPT | Mod: GC

## 2023-08-18 RX ORDER — ACETAMINOPHEN 500 MG
650 TABLET ORAL EVERY 6 HOURS
Refills: 0 | Status: DISCONTINUED | OUTPATIENT
Start: 2023-08-18 | End: 2023-08-18

## 2023-08-18 RX ADMIN — Medication 81 MILLIGRAM(S): at 11:15

## 2023-08-18 RX ADMIN — Medication 88 MICROGRAM(S): at 06:46

## 2023-08-18 RX ADMIN — ENOXAPARIN SODIUM 40 MILLIGRAM(S): 100 INJECTION SUBCUTANEOUS at 11:15

## 2023-08-18 RX ADMIN — LISINOPRIL 5 MILLIGRAM(S): 2.5 TABLET ORAL at 06:45

## 2023-08-18 RX ADMIN — Medication 650 MILLIGRAM(S): at 13:11

## 2023-08-18 RX ADMIN — FAMOTIDINE 20 MILLIGRAM(S): 10 INJECTION INTRAVENOUS at 11:15

## 2023-08-18 RX ADMIN — Medication 650 MILLIGRAM(S): at 12:19

## 2023-08-18 NOTE — DISCHARGE NOTE NURSING/CASE MANAGEMENT/SOCIAL WORK - PATIENT PORTAL LINK FT
Updated Dr. Valdes's office regarding pt's elevated resting HR's. Pt's standing resting HR was 126 BPM, Had pt sit, HR came down to 94 BPM. Pt's BP was 106/80. Pt stated he did not eat and had only had Gatorade to drink. Writer gave pt 2 cups of water. Pt stated feeling a little lightheaded and could feel his fast HR. Pt admits to having anxiety, but is not on any anxiety medication. Pt took all medications as prescribed. After resting and drinking water, pt became asymptomatic and was allowed to exercise. Dr. Valdes's office encouraged pt to drink and eat before cardiac rehab. Will update Dr. Valdes's office on Thursday with HR's.   
You can access the FollowMyHealth Patient Portal offered by [facility] by registering at the following website: [email or web address]. By joining [employer]’s FollowMyHealth portal, you will also be able to view your health information using other applications (apps) compatible with our system.

## 2023-08-18 NOTE — PROGRESS NOTE ADULT - PROBLEM SELECTOR PLAN 4
UA positive, leukocyte esterase, WBC, and bacteria. Pt asymptomatic,  denies dysuria, but experienced worsening incontinence in recent days. Flores placed by uro for resistance on straight cath. Passed TOV, but began retaining >300 again.    - F/u uro recs for potential Flores re-insertion UA positive, leukocyte esterase, WBC, and bacteria. Pt asymptomatic,  denies dysuria, but experienced worsening incontinence in recent days. Flores placed by uro for resistance on straight cath. Passed TOV, but began retaining >300 again. Flores placed again on 08/18     • No acute interventions at this time

## 2023-08-18 NOTE — DISCHARGE NOTE NURSING/CASE MANAGEMENT/SOCIAL WORK - NSDCFUADDAPPT_GEN_ALL_CORE_FT
(1) Please follow up with your primary care provider within the next two weeks as we discussed.     Please confirm with your Insurance if you are required to obtain a referral from your PCP to follow up with your specialty appointments listed below.    Please bring your Insurance card, Current List of Medication, Photo ID and Discharge paperwork to the following appointments:      (2) Please follow up with your Urology Provider, Dr. Brayden Silva at [address], [facility] [place] [address] on 08/23/2023 at 1:00pm.    Appointment was scheduled by Ms. MAIA Zamora, Referral Coordinator.    (3) Please follow up with your Cardiology Provider, Dr. Manny Davis at [address] [place] [address] on 08/30/2023 at 12:20pm.    Appointment was scheduled by Ms. MAIA Zamora, Referral Coordinator.

## 2023-08-18 NOTE — PROGRESS NOTE ADULT - PROBLEM SELECTOR PLAN 1
[resolved] COVID positive on admission. In the ED, satting 86% now on 2L NC. Pt had COVID previously but was not hospitalized. Pt is unsure if she ever got vaccinated for COVID. CXR: Left basilar focal atelectasis  - C/w Decadron 6mg qd  - supportive care  - Hold remdesivir/ as pt has non-severe COVID  - On 2L NC, wean off O2 as tolerated [resolved] COVID positive on admission. In the ED, satting 86% now on 2L NC. Pt had COVID previously but was not hospitalized. Pt is unsure if she ever got vaccinated for COVID. CXR: Left basilar focal atelectasis.     • No acute interventions at this time

## 2023-08-18 NOTE — PROGRESS NOTE ADULT - PROVIDER SPECIALTY LIST ADULT
Internal Medicine
Internal Medicine
Rehab Medicine
Internal Medicine

## 2023-08-18 NOTE — PROGRESS NOTE ADULT - ATTENDING COMMENTS
Pt is a 83 y/o F w/hx CVA (residual gait disturbance and LLE weakness, ambulates with walker), HTN, HLD, hypothyroidism, p/w cough, severe fatigue/malaise likely 2/2 to COVID infection. Pt also developed a tachy narrow complex arrhythmia at ED which broke with valsava maneuver. Pt being admitted to [facility] for closer monitoring of arrythmia and COVID infection. Downgraded to [facility] 08/16.    Guerrero was placed by urology for difficult insertion. Plan was to discharge without guerrero after TOV but patient failed TOV.     Plan:   -D/C tomorrow morning with guerrero, f/u w/ Dr. Yuliya Ventura in 1 week  -Cephalexin on d/c for UTI
Patient admitted for acute hypoxic respiratory failure 2/2 COVID-19, improved with short course of steroids. Stable on room air. Course c/b UTI (POA, treated with ceftriaxone, transition to oral on discharge to complete 5-day course) and urinary retention with failed TOV(evaluated by urology - discharge with guerrero and follow up closely outpatient). Plan for discharge home today.
81 yo F w/ SVT, AHRF, and COVID-19 also found to have asymptomatic UA initially not treated as UTI, but started on decadron + remdesivir for 6L NC oxygen requirements. Has quickly been weaned to room air, question if hypoxia was COVID related but will continue with treatment. Found to have urinary retention and difficult to place straight cath a/w low grade oral temps (99.1) today. Given age and significant pyuria will start ceftriaxone, F/U UCx and urology consult. Had brief SVT in ER that resolved with vagal maneuvers, no further arrhythmias on telemetry. Stable for transfer to [facility].     Reviewd CXR, labs, medications. Decision making of highest complexity
#covid pna: initially hypoxic, now on RA satting well. Cont decadrom and remdesivir tx. Monitor resp symptoms    #SVT: brief run of SVT ~1min in ED, resolved w/ vagal maneuver. Trops neg. Now in nsr, non ischemic and asymptomatic. low c/f covid myocarditis. No further events on tele. Monitor and replete electrolytes    #UTI: c/w Ceft, f/up ucx.

## 2023-08-18 NOTE — PROGRESS NOTE ADULT - SUBJECTIVE AND OBJECTIVE BOX
***INCOMPLETE NOTE***    INTERVAL EVENTS:   No acute events overnight.    SUBJECTIVE / INTERVAL HPI:   Patient seen and examined at bedside. Condition largely unchanged from yesterday. No acute complaints at this time.    ROS: negative unless otherwise stated above.    VITAL SIGNS:  Vital Signs Last 24 Hrs  T(C): 36.7 (18 Aug 2023 06:26), Max: 36.7 (18 Aug 2023 06:26)  T(F): 98.1 (18 Aug 2023 06:26), Max: 98.1 (18 Aug 2023 06:26)  HR: 62 (18 Aug 2023 06:26) (62 - 100)  BP: 127/78 (18 Aug 2023 06:26) (107/71 - 127/78)  BP(mean): --  RR: 17 (18 Aug 2023 06:26) (17 - 18)  SpO2: 92% (18 Aug 2023 06:26) (91% - 94%)    Parameters below as of 18 Aug 2023 06:26  Patient On (Oxygen Delivery Method): room air          08-17-23 @ 07:01  -  08-18-23 @ 07:00  --------------------------------------------------------  IN: 150 mL / OUT: 800 mL / NET: -650 mL        MEDICATIONS:  MEDICATIONS  (STANDING):  aspirin  chewable 81 milliGRAM(s) Oral daily  atorvastatin 20 milliGRAM(s) Oral at bedtime  dextrose 5%. 1000 milliLiter(s) (50 mL/Hr) IV Continuous <Continuous>  dextrose 5%. 1000 milliLiter(s) (100 mL/Hr) IV Continuous <Continuous>  dextrose 50% Injectable 25 Gram(s) IV Push once  dextrose 50% Injectable 12.5 Gram(s) IV Push once  dextrose 50% Injectable 25 Gram(s) IV Push once  enoxaparin Injectable 40 milliGRAM(s) SubCutaneous every 24 hours  famotidine    Tablet 20 milliGRAM(s) Oral daily  glucagon  Injectable 1 milliGRAM(s) IntraMuscular once  insulin lispro (ADMELOG) corrective regimen sliding scale   SubCutaneous three times a day before meals  levothyroxine 88 MICROGram(s) Oral every 24 hours  lisinopril 5 milliGRAM(s) Oral every 24 hours  polyethylene glycol 3350 17 Gram(s) Oral daily  remdesivir  IVPB   IV Intermittent   remdesivir  IVPB 100 milliGRAM(s) IV Intermittent every 24 hours  senna 2 Tablet(s) Oral at bedtime    MEDICATIONS  (PRN):  dextrose Oral Gel 15 Gram(s) Oral once PRN Blood Glucose LESS THAN 70 milliGRAM(s)/deciliter      ALLERGIES:  Allergies    No Known Allergies    Intolerances        LABS:                        8.8    5.70  )-----------( 297      ( 18 Aug 2023 07:26 )             30.8     08-17    141  |  110<H>  |  24<H>  ----------------------------<  104<H>  3.7   |  23  |  0.66    Ca    9.2      17 Aug 2023 08:38  Phos  3.9     08-17  Mg     1.9     08-17        Urinalysis Basic - ( 17 Aug 2023 08:38 )    Color: x / Appearance: x / SG: x / pH: x  Gluc: 104 mg/dL / Ketone: x  / Bili: x / Urobili: x   Blood: x / Protein: x / Nitrite: x   Leuk Esterase: x / RBC: x / WBC x   Sq Epi: x / Non Sq Epi: x / Bacteria: x      CAPILLARY BLOOD GLUCOSE      POCT Blood Glucose.: 152 mg/dL (17 Aug 2023 12:38)      RADIOLOGY & ADDITIONAL TESTS: Reviewed. INTERVAL EVENTS:    • Flores placed yesterday by urology    SUBJECTIVE / INTERVAL HPI:   Patient seen and examined at bedside. Condition largely unchanged from yesterday, except for re-inserted Flores that is draining clear urine. No acute complaints at this time.     ROS: negative unless otherwise stated above.    VITAL SIGNS:  Vital Signs Last 24 Hrs  T(C): 36.7 (18 Aug 2023 06:26), Max: 36.7 (18 Aug 2023 06:26)  T(F): 98.1 (18 Aug 2023 06:26), Max: 98.1 (18 Aug 2023 06:26)  HR: 62 (18 Aug 2023 06:26) (62 - 100)  BP: 127/78 (18 Aug 2023 06:26) (107/71 - 127/78)  BP(mean): --  RR: 17 (18 Aug 2023 06:26) (17 - 18)  SpO2: 92% (18 Aug 2023 06:26) (91% - 94%)    Parameters below as of 18 Aug 2023 06:26  Patient On (Oxygen Delivery Method): room air          08-17-23 @ 07:01  -  08-18-23 @ 07:00  --------------------------------------------------------  IN: 150 mL / OUT: 800 mL / NET: -650 mL        MEDICATIONS:  MEDICATIONS  (STANDING):  aspirin  chewable 81 milliGRAM(s) Oral daily  atorvastatin 20 milliGRAM(s) Oral at bedtime  dextrose 5%. 1000 milliLiter(s) (50 mL/Hr) IV Continuous <Continuous>  dextrose 5%. 1000 milliLiter(s) (100 mL/Hr) IV Continuous <Continuous>  dextrose 50% Injectable 25 Gram(s) IV Push once  dextrose 50% Injectable 12.5 Gram(s) IV Push once  dextrose 50% Injectable 25 Gram(s) IV Push once  enoxaparin Injectable 40 milliGRAM(s) SubCutaneous every 24 hours  famotidine    Tablet 20 milliGRAM(s) Oral daily  glucagon  Injectable 1 milliGRAM(s) IntraMuscular once  insulin lispro (ADMELOG) corrective regimen sliding scale   SubCutaneous three times a day before meals  levothyroxine 88 MICROGram(s) Oral every 24 hours  lisinopril 5 milliGRAM(s) Oral every 24 hours  polyethylene glycol 3350 17 Gram(s) Oral daily  remdesivir  IVPB   IV Intermittent   remdesivir  IVPB 100 milliGRAM(s) IV Intermittent every 24 hours  senna 2 Tablet(s) Oral at bedtime    MEDICATIONS  (PRN):  dextrose Oral Gel 15 Gram(s) Oral once PRN Blood Glucose LESS THAN 70 milliGRAM(s)/deciliter      ALLERGIES:  Allergies    No Known Allergies    Intolerances        LABS:                        8.8    5.70  )-----------( 297      ( 18 Aug 2023 07:26 )             30.8     08-17    141  |  110<H>  |  24<H>  ----------------------------<  104<H>  3.7   |  23  |  0.66    Ca    9.2      17 Aug 2023 08:38  Phos  3.9     08-17  Mg     1.9     08-17        Urinalysis Basic - ( 17 Aug 2023 08:38 )    Color: x / Appearance: x / SG: x / pH: x  Gluc: 104 mg/dL / Ketone: x  / Bili: x / Urobili: x   Blood: x / Protein: x / Nitrite: x   Leuk Esterase: x / RBC: x / WBC x   Sq Epi: x / Non Sq Epi: x / Bacteria: x      CAPILLARY BLOOD GLUCOSE      POCT Blood Glucose.: 152 mg/dL (17 Aug 2023 12:38)      RADIOLOGY & ADDITIONAL TESTS: Reviewed.

## 2023-08-18 NOTE — PROGRESS NOTE ADULT - PROBLEM SELECTOR PLAN 3
i/s/o COVID infection and hypoxia. EKG showing tachy narrow complex arrhythmia with rate in the 170s for approximately 1 minute that broke with pt doing valsava manuver. Repeat EKG showing NSR.  - Patient w/o arrhythmia since initial episode. i/s/o COVID infection and hypoxia. EKG showing tachy narrow complex arrhythmia with rate in the 170s for approximately 1 minute that broke with pt doing valsava manuver. Repeat EKG showed NSR.  8/17: Repeat episode of SVT to 150, resolved without intervention.     - Patient w/o arrhythmia since initial episode.

## 2023-08-18 NOTE — PROGRESS NOTE ADULT - ASSESSMENT
Pt is a 81 y/o F w/hx CVA (residual gait disturbance and LLE weakness, ambulates with walker), HTN, HLD, hypothyroidism, p/w cough, severe fatigue/malaise likely 2/2 to COVID infection. Pt also developed a tachy narrow complex arrhythmia at ED which broke with valsava maneuver. Pt being admitted to [facility] for closer monitoring of arrythmia and COVID infection.

## 2023-08-18 NOTE — PROGRESS NOTE ADULT - RESPIRATORY
normal/clear to auscultation bilaterally/no wheezes/no rales/no rhonchi
normal/clear to auscultation bilaterally/no wheezes/no rales/no rhonchi
Occasional cough/clear to auscultation bilaterally/no wheezes/no rales/no rhonchi/no respiratory distress/no use of accessory muscles

## 2023-08-18 NOTE — PROGRESS NOTE ADULT - PROBLEM SELECTOR PLAN 2
COVID positive at home and in the ED. Patient showed symptomatic improvement on remdesivir and decadron.    - F/u pt's ambulatory vitals COVID positive at home and in the ED. Patient showed symptomatic improvement on remdesivir and decadron. Ambulatory vitals stable, per nurse. Off 2L NC, satting well on room air.    - C/w Decadron 6mg qd  - D/c remdesivir prior to discharge

## 2023-08-18 NOTE — DISCHARGE NOTE NURSING/CASE MANAGEMENT/SOCIAL WORK - NSPROEXTENSIONSOFSELF_GEN_A_NUR
Goal Outcome Evaluation:  Plan of Care Reviewed With: patient, family           Outcome Evaluation: Pt is an 85 yo F admitted following a fall resulting in a R femoral neck fx. Pt now POD 1 R posterior hip hemiarthroplasty and is WBAT. Pt with knee immobilizer on during eval, no orders for KI in system, d/w RN following session but kept KI on throughout. Pt lives alone and reports independence at BL with a cane as needed. Pt has no steps at home and denies other recent falls. Pt with recent LLE vascular surgery and reporting increased weakness, likely contributing to this fall. Pt presents to PT with impaired strength, endurance, and pain limiting overall mobility. Pt c/o more LLE pain d/t vascular surgery. Pt transferred to EOB with CGA, STS with min A x1, and ambulated 5ft to the chair with CGA and rwx. Pt cued for sequencing of steps with rwx and upright posture. Impaired RLE weightbearing, but tolerated mobility well. Pt declines further gait today, but agreeable to sitting UIC. Assisted with repositioning and left with needs met, RN present. PT will continue to follow to progress mobility as tolerated. Pt interested in SNF at DC, stating she feels generally much weaker and more unsteady than BL with now 2 recent LE surgeries. Anticipate DC to SNF pending progress.   walker

## 2023-08-20 LAB
CULTURE RESULTS: SIGNIFICANT CHANGE UP
CULTURE RESULTS: SIGNIFICANT CHANGE UP
SPECIMEN SOURCE: SIGNIFICANT CHANGE UP
SPECIMEN SOURCE: SIGNIFICANT CHANGE UP

## 2023-08-23 ENCOUNTER — APPOINTMENT (OUTPATIENT)
Dept: UROLOGY | Facility: CLINIC | Age: 83
End: 2023-08-23
Payer: MEDICARE

## 2023-08-23 VITALS
HEIGHT: 60 IN | TEMPERATURE: 97.7 F | WEIGHT: 125 LBS | HEART RATE: 104 BPM | OXYGEN SATURATION: 94 % | BODY MASS INDEX: 24.54 KG/M2

## 2023-08-23 DIAGNOSIS — E03.9 HYPOTHYROIDISM, UNSPECIFIED: ICD-10-CM

## 2023-08-23 DIAGNOSIS — N39.3 STRESS INCONTINENCE (FEMALE) (MALE): ICD-10-CM

## 2023-08-23 DIAGNOSIS — Z78.9 OTHER SPECIFIED HEALTH STATUS: ICD-10-CM

## 2023-08-23 PROBLEM — Z00.00 ENCOUNTER FOR PREVENTIVE HEALTH EXAMINATION: Status: ACTIVE | Noted: 2023-08-23

## 2023-08-23 PROCEDURE — 99204 OFFICE O/P NEW MOD 45 MIN: CPT

## 2023-08-23 RX ORDER — ROSUVASTATIN CALCIUM 20 MG/1
20 TABLET, FILM COATED ORAL
Refills: 0 | Status: ACTIVE | COMMUNITY

## 2023-08-23 RX ORDER — ASPIRIN 81 MG/1
81 TABLET, CHEWABLE ORAL
Refills: 0 | Status: ACTIVE | COMMUNITY

## 2023-08-23 RX ORDER — LEVOTHYROXINE SODIUM 150 UG/1
150 TABLET ORAL
Refills: 0 | Status: ACTIVE | COMMUNITY

## 2023-08-23 NOTE — PHYSICAL EXAM
[General Appearance - Well Developed] : well developed [General Appearance - Well Nourished] : well nourished [Normal Appearance] : normal appearance [Well Groomed] : well groomed [General Appearance - In No Acute Distress] : no acute distress [] : no respiratory distress [Respiration, Rhythm And Depth] : normal respiratory rhythm and effort [Exaggerated Use Of Accessory Muscles For Inspiration] : no accessory muscle use [Oriented To Time, Place, And Person] : oriented to person, place, and time [Heart Rate And Rhythm] : Heart rate and rhythm were normal [Abdomen Soft] : soft [Normal Station and Gait] : the gait and station were normal for the patient's age [FreeTextEntry1] : walker  [Skin Color & Pigmentation] : normal skin color and pigmentation [No Focal Deficits] : no focal deficits

## 2023-08-23 NOTE — ASSESSMENT
[FreeTextEntry1] : 82 yr old female with PMH CVA, hld, hypothyroid presents for hospital follow-up after COVID and urinary retention.   We discussed her retention and that her chronic constipation which has recently become more severe, is likely affecting her ability to urinate. We offered her the option of TOV today or going home with bowel regimen and returning on Friday for TOV after constipation has resolved. Patient elected to keep catheter today and returning on Friday.   Plan: 1. Bowel regimen: stool softener, MiraLAX and/or enema 2. RTC Friday for TOV   I, Dr. Silva, personally performed the evaluation and management (E/M) services for this new patient.  That E/M includes conducting the initial examination, assessing all conditions, and establishing the plan of care.  Today, my ACP, Yolis Gonzales, was here to observe my evaluation and management services for this patient to be followed going forward.

## 2023-08-23 NOTE — HISTORY OF PRESENT ILLNESS
[FreeTextEntry1] : 82 yr old female presents for hospital follow-up.   She was in [facility] for COVID and was found to have urinary retention and guerrero was placed. She failed TOV and was sent home with indwelling catheter. She states she has been having a lot of constipation. She had a very small, hard "pebble" bowel movement this morning. She had chronic constipation, but lately it has been worse.   She has hx of a prolapse repair in 2006 with mesh. She then had mesh protrusion requiring multiple surgeries to repair. She was ultimately seen by Dr. Rodrigues at [facility]. She still has baseline incontinence which requires a pad. She never had hx of retention in the past.

## 2023-08-24 DIAGNOSIS — U07.1 COVID-19: ICD-10-CM

## 2023-08-24 DIAGNOSIS — E03.9 HYPOTHYROIDISM, UNSPECIFIED: ICD-10-CM

## 2023-08-24 DIAGNOSIS — Z79.899 OTHER LONG TERM (CURRENT) DRUG THERAPY: ICD-10-CM

## 2023-08-24 DIAGNOSIS — J98.11 ATELECTASIS: ICD-10-CM

## 2023-08-24 DIAGNOSIS — E78.5 HYPERLIPIDEMIA, UNSPECIFIED: ICD-10-CM

## 2023-08-24 DIAGNOSIS — J96.01 ACUTE RESPIRATORY FAILURE WITH HYPOXIA: ICD-10-CM

## 2023-08-24 DIAGNOSIS — Z90.710 ACQUIRED ABSENCE OF BOTH CERVIX AND UTERUS: ICD-10-CM

## 2023-08-24 DIAGNOSIS — G89.29 OTHER CHRONIC PAIN: ICD-10-CM

## 2023-08-24 DIAGNOSIS — N39.0 URINARY TRACT INFECTION, SITE NOT SPECIFIED: ICD-10-CM

## 2023-08-24 DIAGNOSIS — I69.342 MONOPLEGIA OF LOWER LIMB FOLLOWING CEREBRAL INFARCTION AFFECTING LEFT DOMINANT SIDE: ICD-10-CM

## 2023-08-24 DIAGNOSIS — I10 ESSENTIAL (PRIMARY) HYPERTENSION: ICD-10-CM

## 2023-08-24 DIAGNOSIS — R33.9 RETENTION OF URINE, UNSPECIFIED: ICD-10-CM

## 2023-08-24 DIAGNOSIS — D50.9 IRON DEFICIENCY ANEMIA, UNSPECIFIED: ICD-10-CM

## 2023-08-24 DIAGNOSIS — R10.9 UNSPECIFIED ABDOMINAL PAIN: ICD-10-CM

## 2023-08-24 DIAGNOSIS — I47.1 SUPRAVENTRICULAR TACHYCARDIA: ICD-10-CM

## 2023-08-25 ENCOUNTER — APPOINTMENT (OUTPATIENT)
Dept: UROLOGY | Facility: CLINIC | Age: 83
End: 2023-08-25

## 2023-08-28 ENCOUNTER — APPOINTMENT (OUTPATIENT)
Dept: UROLOGY | Facility: CLINIC | Age: 83
End: 2023-08-28
Payer: MEDICARE

## 2023-08-28 DIAGNOSIS — K59.00 CONSTIPATION, UNSPECIFIED: ICD-10-CM

## 2023-08-28 DIAGNOSIS — R33.9 RETENTION OF URINE, UNSPECIFIED: ICD-10-CM

## 2023-08-28 PROCEDURE — 51700 IRRIGATION OF BLADDER: CPT

## 2023-08-28 PROCEDURE — A4216: CPT | Mod: NC

## 2023-08-28 NOTE — HISTORY OF PRESENT ILLNESS
[FreeTextEntry1] : 8/23/23: 82 yr old female presents for hospital follow-up.  She was in [facility] for COVID and was found to have urinary retention and guerrero was placed. She failed TOV and was sent home with indwelling catheter. She states she has been having a lot of constipation. She had a very small, hard "pebble" bowel movement this morning. She had chronic constipation, but lately it has been worse.  She has hx of a prolapse repair in 2006 with mesh. She then had mesh protrusion requiring multiple surgeries to repair. She was ultimately seen by Dr. Rodrigues at [facility]. She still has baseline incontinence which requires a pad. She never had hx of retention in the past.  8/28/23: Patient here for TOV. She has been having BMs since starting miralax.   TOV:  - 220 cc of sterile water instilled into existing catheter  - Catheter removed  - Patient voided 200 cc in the bathroom - Final PVR 1 ml

## 2023-08-28 NOTE — ASSESSMENT
[FreeTextEntry1] : 82 yr old female presents with acute urinary retention after COVID hospitalization, UTI, and constipation. Patient passed TOV today. Encouraged regular voiding patterns, fluid intake, and bowel regimen. Patient will follow up in 2 weeks for PVR.  Plan: 1. Continue bowel regimen 2. RTC 2 weeks for UA, PVR

## 2023-08-28 NOTE — PHYSICAL EXAM
[General Appearance - Well Developed] : well developed [General Appearance - Well Nourished] : well nourished [Normal Appearance] : normal appearance [Well Groomed] : well groomed [General Appearance - In No Acute Distress] : no acute distress [Oriented To Time, Place, And Person] : oriented to person, place, and time [Affect] : the affect was normal [Mood] : the mood was normal [Not Anxious] : not anxious [FreeTextEntry1] : walker

## 2023-08-31 ENCOUNTER — APPOINTMENT (OUTPATIENT)
Dept: HEART AND VASCULAR | Facility: CLINIC | Age: 83
End: 2023-08-31
Payer: MEDICARE

## 2023-08-31 ENCOUNTER — NON-APPOINTMENT (OUTPATIENT)
Age: 83
End: 2023-08-31

## 2023-08-31 VITALS
OXYGEN SATURATION: 97 % | DIASTOLIC BLOOD PRESSURE: 70 MMHG | HEIGHT: 60 IN | TEMPERATURE: 98.3 F | WEIGHT: 119 LBS | SYSTOLIC BLOOD PRESSURE: 130 MMHG | HEART RATE: 71 BPM | BODY MASS INDEX: 23.36 KG/M2

## 2023-08-31 DIAGNOSIS — E78.5 HYPERLIPIDEMIA, UNSPECIFIED: ICD-10-CM

## 2023-08-31 DIAGNOSIS — I47.10 SUPRAVENTRICULAR TACHYCARDIA, UNSPECIFIED: ICD-10-CM

## 2023-08-31 DIAGNOSIS — I69.359 HEMIPLEGIA AND HEMIPARESIS FOLLOWING CEREBRAL INFARCTION AFFECTING UNSPECIFIED SIDE: ICD-10-CM

## 2023-08-31 DIAGNOSIS — I10 ESSENTIAL (PRIMARY) HYPERTENSION: ICD-10-CM

## 2023-08-31 PROCEDURE — 99203 OFFICE O/P NEW LOW 30 MIN: CPT | Mod: 25

## 2023-08-31 PROCEDURE — 93000 ELECTROCARDIOGRAM COMPLETE: CPT

## 2023-08-31 RX ORDER — LISINOPRIL 5 MG/1
5 TABLET ORAL DAILY
Refills: 0 | Status: ACTIVE | COMMUNITY

## 2023-08-31 RX ORDER — RALOXIFENE HYDROCHLORIDE 60 MG/1
60 TABLET, FILM COATED ORAL DAILY
Refills: 0 | Status: ACTIVE | COMMUNITY

## 2023-08-31 RX ORDER — AMITRIPTYLINE HYDROCHLORIDE 25 MG/1
25 TABLET, FILM COATED ORAL
Refills: 0 | Status: ACTIVE | COMMUNITY

## 2023-08-31 RX ORDER — LEVOTHYROXINE SODIUM 0.09 MG/1
88 TABLET ORAL DAILY
Refills: 0 | Status: ACTIVE | COMMUNITY

## 2023-08-31 RX ORDER — FAMOTIDINE 20 MG/1
20 TABLET, FILM COATED ORAL
Refills: 0 | Status: ACTIVE | COMMUNITY

## 2023-09-01 NOTE — PATIENT PROFILE ADULT - DO YOU FEEL LIKE HURTING YOURSELF OR OTHERS?
Major Shift Events:  No acute events overnight.     Neuro status remains unchanged. HIGHTOWER, not following commands, and pupils PERRLA. Pt. Is restless and agitated. PRN Seroquel and oxy given. Propofol gtt @ 20. Titrated levo and vaso for map goals >65. T. Max 100.1. Junctional rhythm. Pulses with doppler. Remains on full CMV settings: 30%, 430, 14 and 5. Five chest tubes to -20 suction. NJ with TF @ goal of 55 with 150 mL water flushes q4hr. One large loose BM. No new skin deficits.   Plan: possible extubation today? Continue with POC and notify provider with any changes.     For vital signs and complete assessments, please see documentation flowsheets.     Kathleen Hines RN on 9/1/2023 at 5:37 AM       no

## 2023-09-12 ENCOUNTER — APPOINTMENT (OUTPATIENT)
Dept: UROLOGY | Facility: CLINIC | Age: 83
End: 2023-09-12

## 2023-10-15 PROBLEM — I10 BENIGN ESSENTIAL HYPERTENSION: Status: ACTIVE | Noted: 2023-10-15

## 2023-10-15 PROBLEM — I47.10 SUPRAVENTRICULAR TACHYCARDIA, NONSUSTAINED: Status: ACTIVE | Noted: 2023-10-15

## 2023-10-15 PROBLEM — I69.359 CVA, OLD, HEMIPARESIS: Status: ACTIVE | Noted: 2023-08-23

## 2023-10-15 PROBLEM — E78.5 HYPERLIPIDEMIA, ACQUIRED: Status: ACTIVE | Noted: 2023-08-23

## 2023-11-13 PROCEDURE — 87086 URINE CULTURE/COLONY COUNT: CPT

## 2023-11-13 PROCEDURE — 85025 COMPLETE CBC W/AUTO DIFF WBC: CPT

## 2023-11-13 PROCEDURE — 80048 BASIC METABOLIC PNL TOTAL CA: CPT

## 2023-11-13 PROCEDURE — 81001 URINALYSIS AUTO W/SCOPE: CPT

## 2023-11-13 PROCEDURE — 93005 ELECTROCARDIOGRAM TRACING: CPT

## 2023-11-13 PROCEDURE — 80053 COMPREHEN METABOLIC PANEL: CPT

## 2023-11-13 PROCEDURE — 83036 HEMOGLOBIN GLYCOSYLATED A1C: CPT

## 2023-11-13 PROCEDURE — 83880 ASSAY OF NATRIURETIC PEPTIDE: CPT

## 2023-11-13 PROCEDURE — 84100 ASSAY OF PHOSPHORUS: CPT

## 2023-11-13 PROCEDURE — 93306 TTE W/DOPPLER COMPLETE: CPT

## 2023-11-13 PROCEDURE — 96374 THER/PROPH/DIAG INJ IV PUSH: CPT

## 2023-11-13 PROCEDURE — 85730 THROMBOPLASTIN TIME PARTIAL: CPT

## 2023-11-13 PROCEDURE — 36415 COLL VENOUS BLD VENIPUNCTURE: CPT

## 2023-11-13 PROCEDURE — 85027 COMPLETE CBC AUTOMATED: CPT

## 2023-11-13 PROCEDURE — 82728 ASSAY OF FERRITIN: CPT

## 2023-11-13 PROCEDURE — 84484 ASSAY OF TROPONIN QUANT: CPT

## 2023-11-13 PROCEDURE — 97530 THERAPEUTIC ACTIVITIES: CPT

## 2023-11-13 PROCEDURE — 71045 X-RAY EXAM CHEST 1 VIEW: CPT

## 2023-11-13 PROCEDURE — 97116 GAIT TRAINING THERAPY: CPT

## 2023-11-13 PROCEDURE — 84466 ASSAY OF TRANSFERRIN: CPT

## 2023-11-13 PROCEDURE — 87186 SC STD MICRODIL/AGAR DIL: CPT

## 2023-11-13 PROCEDURE — 87635 SARS-COV-2 COVID-19 AMP PRB: CPT

## 2023-11-13 PROCEDURE — 83735 ASSAY OF MAGNESIUM: CPT

## 2023-11-13 PROCEDURE — 83540 ASSAY OF IRON: CPT

## 2023-11-13 PROCEDURE — 82962 GLUCOSE BLOOD TEST: CPT

## 2023-11-13 PROCEDURE — 87077 CULTURE AEROBIC IDENTIFY: CPT

## 2023-11-13 PROCEDURE — 83550 IRON BINDING TEST: CPT

## 2023-11-13 PROCEDURE — 82803 BLOOD GASES ANY COMBINATION: CPT

## 2023-11-13 PROCEDURE — 97161 PT EVAL LOW COMPLEX 20 MIN: CPT

## 2023-11-13 PROCEDURE — 99285 EMERGENCY DEPT VISIT HI MDM: CPT | Mod: 25

## 2023-11-13 PROCEDURE — 85610 PROTHROMBIN TIME: CPT

## 2023-11-13 PROCEDURE — 87040 BLOOD CULTURE FOR BACTERIA: CPT

## 2023-11-13 PROCEDURE — 83605 ASSAY OF LACTIC ACID: CPT

## 2025-05-12 ENCOUNTER — INPATIENT (INPATIENT)
Facility: HOSPITAL | Age: 85
LOS: 2 days | Discharge: HOME | End: 2025-05-15
Attending: STUDENT IN AN ORGANIZED HEALTH CARE EDUCATION/TRAINING PROGRAM | Admitting: HOSPITALIST
Payer: MEDICARE

## 2025-05-12 VITALS
OXYGEN SATURATION: 91 % | TEMPERATURE: 98 F | SYSTOLIC BLOOD PRESSURE: 126 MMHG | WEIGHT: 100.09 LBS | HEART RATE: 79 BPM | RESPIRATION RATE: 18 BRPM | DIASTOLIC BLOOD PRESSURE: 78 MMHG

## 2025-05-12 DIAGNOSIS — Z90.710 ACQUIRED ABSENCE OF BOTH CERVIX AND UTERUS: Chronic | ICD-10-CM

## 2025-05-12 DIAGNOSIS — Z98.890 OTHER SPECIFIED POSTPROCEDURAL STATES: Chronic | ICD-10-CM

## 2025-05-12 LAB
ALBUMIN SERPL ELPH-MCNC: 3.2 G/DL — LOW (ref 3.4–5)
ALP SERPL-CCNC: 47 U/L — SIGNIFICANT CHANGE UP (ref 40–120)
ALT FLD-CCNC: 11 U/L — LOW (ref 12–42)
ANION GAP SERPL CALC-SCNC: 9 MMOL/L — SIGNIFICANT CHANGE UP (ref 9–16)
APPEARANCE UR: ABNORMAL
AST SERPL-CCNC: 14 U/L — LOW (ref 15–37)
BASOPHILS # BLD AUTO: 0.02 K/UL — SIGNIFICANT CHANGE UP (ref 0–0.2)
BASOPHILS NFR BLD AUTO: 0.2 % — SIGNIFICANT CHANGE UP (ref 0–2)
BILIRUB SERPL-MCNC: 0.4 MG/DL — SIGNIFICANT CHANGE UP (ref 0.2–1.2)
BILIRUB UR-MCNC: NEGATIVE — SIGNIFICANT CHANGE UP
BUN SERPL-MCNC: 13 MG/DL — SIGNIFICANT CHANGE UP (ref 7–23)
CALCIUM SERPL-MCNC: 9.4 MG/DL — SIGNIFICANT CHANGE UP (ref 8.5–10.5)
CHLORIDE SERPL-SCNC: 107 MMOL/L — SIGNIFICANT CHANGE UP (ref 96–108)
CO2 SERPL-SCNC: 26 MMOL/L — SIGNIFICANT CHANGE UP (ref 22–31)
COLOR SPEC: YELLOW — SIGNIFICANT CHANGE UP
CREAT SERPL-MCNC: 0.67 MG/DL — SIGNIFICANT CHANGE UP (ref 0.5–1.3)
DIFF PNL FLD: ABNORMAL
EGFR: 85 ML/MIN/1.73M2 — SIGNIFICANT CHANGE UP
EGFR: 85 ML/MIN/1.73M2 — SIGNIFICANT CHANGE UP
EOSINOPHIL # BLD AUTO: 0 K/UL — SIGNIFICANT CHANGE UP (ref 0–0.5)
EOSINOPHIL NFR BLD AUTO: 0 % — SIGNIFICANT CHANGE UP (ref 0–6)
GLUCOSE SERPL-MCNC: 107 MG/DL — HIGH (ref 70–99)
GLUCOSE UR QL: NEGATIVE MG/DL — SIGNIFICANT CHANGE UP
HCT VFR BLD CALC: 33.4 % — LOW (ref 34.5–45)
HGB BLD-MCNC: 9.8 G/DL — LOW (ref 11.5–15.5)
IMM GRANULOCYTES # BLD AUTO: 0.04 K/UL — SIGNIFICANT CHANGE UP (ref 0–0.07)
IMM GRANULOCYTES NFR BLD AUTO: 0.4 % — SIGNIFICANT CHANGE UP (ref 0–0.9)
KETONES UR QL: ABNORMAL MG/DL
LEUKOCYTE ESTERASE UR-ACNC: NEGATIVE — SIGNIFICANT CHANGE UP
LIDOCAIN IGE QN: 16 U/L — SIGNIFICANT CHANGE UP (ref 16–77)
LYMPHOCYTES # BLD AUTO: 0.31 K/UL — LOW (ref 1–3.3)
LYMPHOCYTES NFR BLD AUTO: 3.1 % — LOW (ref 13–44)
MCHC RBC-ENTMCNC: 24.3 PG — LOW (ref 27–34)
MCHC RBC-ENTMCNC: 29.3 G/DL — LOW (ref 32–36)
MCV RBC AUTO: 82.9 FL — SIGNIFICANT CHANGE UP (ref 80–100)
MONOCYTES # BLD AUTO: 0.8 K/UL — SIGNIFICANT CHANGE UP (ref 0–0.9)
MONOCYTES NFR BLD AUTO: 8 % — SIGNIFICANT CHANGE UP (ref 2–14)
NEUTROPHILS # BLD AUTO: 8.83 K/UL — HIGH (ref 1.8–7.4)
NEUTROPHILS NFR BLD AUTO: 88.3 % — HIGH (ref 43–77)
NITRITE UR-MCNC: POSITIVE
NRBC # BLD AUTO: 0 K/UL — SIGNIFICANT CHANGE UP (ref 0–0)
NRBC # FLD: 0 K/UL — SIGNIFICANT CHANGE UP (ref 0–0)
NRBC BLD AUTO-RTO: 0 /100 WBCS — SIGNIFICANT CHANGE UP (ref 0–0)
PH UR: 6.5 — SIGNIFICANT CHANGE UP (ref 5–8)
PLATELET # BLD AUTO: 212 K/UL — SIGNIFICANT CHANGE UP (ref 150–400)
PMV BLD: 9.7 FL — SIGNIFICANT CHANGE UP (ref 7–13)
POTASSIUM SERPL-MCNC: 3.7 MMOL/L — SIGNIFICANT CHANGE UP (ref 3.5–5.3)
POTASSIUM SERPL-SCNC: 3.7 MMOL/L — SIGNIFICANT CHANGE UP (ref 3.5–5.3)
PROT SERPL-MCNC: 6.4 G/DL — SIGNIFICANT CHANGE UP (ref 6.4–8.2)
PROT UR-MCNC: 30 MG/DL
RBC # BLD: 4.03 M/UL — SIGNIFICANT CHANGE UP (ref 3.8–5.2)
RBC # FLD: 14.2 % — SIGNIFICANT CHANGE UP (ref 10.3–14.5)
SODIUM SERPL-SCNC: 142 MMOL/L — SIGNIFICANT CHANGE UP (ref 132–145)
SP GR SPEC: 1.06 — HIGH (ref 1–1.03)
TROPONIN I, HIGH SENSITIVITY RESULT: 5.2 NG/L — SIGNIFICANT CHANGE UP
UROBILINOGEN FLD QL: 1 MG/DL — SIGNIFICANT CHANGE UP (ref 0.2–1)
WBC # BLD: 10 K/UL — SIGNIFICANT CHANGE UP (ref 3.8–10.5)
WBC # FLD AUTO: 10 K/UL — SIGNIFICANT CHANGE UP (ref 3.8–10.5)

## 2025-05-12 PROCEDURE — 99221 1ST HOSP IP/OBS SF/LOW 40: CPT

## 2025-05-12 PROCEDURE — 70450 CT HEAD/BRAIN W/O DYE: CPT | Mod: 26

## 2025-05-12 PROCEDURE — 71045 X-RAY EXAM CHEST 1 VIEW: CPT | Mod: 26

## 2025-05-12 PROCEDURE — 99285 EMERGENCY DEPT VISIT HI MDM: CPT

## 2025-05-12 PROCEDURE — 74177 CT ABD & PELVIS W/CONTRAST: CPT | Mod: 26

## 2025-05-12 RX ORDER — CEFTRIAXONE 500 MG/1
1000 INJECTION, POWDER, FOR SOLUTION INTRAMUSCULAR; INTRAVENOUS ONCE
Refills: 0 | Status: COMPLETED | OUTPATIENT
Start: 2025-05-12 | End: 2025-05-12

## 2025-05-12 RX ADMIN — CEFTRIAXONE 100 MILLIGRAM(S): 500 INJECTION, POWDER, FOR SOLUTION INTRAMUSCULAR; INTRAVENOUS at 20:39

## 2025-05-12 RX ADMIN — Medication 500 MILLILITER(S): at 16:01

## 2025-05-12 RX ADMIN — CEFTRIAXONE 1000 MILLIGRAM(S): 500 INJECTION, POWDER, FOR SOLUTION INTRAMUSCULAR; INTRAVENOUS at 21:09

## 2025-05-12 NOTE — ED ADULT TRIAGE NOTE - CHIEF COMPLAINT QUOTE
Pt BIBEMS c/o N/V, diarrhea, and L hip pain. Pt noted to be hypoxic to 91% on RA. Speaking in full sentences, denies SOB.

## 2025-05-12 NOTE — ED PROVIDER NOTE - CLINICAL SUMMARY MEDICAL DECISION MAKING FREE TEXT BOX
Patient with history of hypertension, hyperlipidemia, CVA without residual deficits, presents with decreased p.o. intake and generalized malaise for the past 3 days.  On exam, patient is afebrile, vital signs are stable.  Patient has mild diffuse abdominal tenderness to palpation.  No localizing neurologic deficit.  Differential includes acute intracranial process, acute infectious process, acute intra-abdominal process.  Plan for labs, UA, CT head, CT abdomen/pelvis, reassess.

## 2025-05-12 NOTE — ED ADULT NURSE REASSESSMENT NOTE - NS ED NURSE REASSESS COMMENT FT1
Received report from Yasmin ADAME for continuity of care  Pt in bed, Pt AOX4, speaking in full clear sentences, breathing equal and unlabored  Pt connected to cardiac/spo2 monitor  Pt with no current complaints

## 2025-05-12 NOTE — ED ADULT NURSE NOTE - OBJECTIVE STATEMENT
Pt presents with son who states she has been feeling bad x3 days with decreased PO intake, generalized weakness, cough, nausea, Pt alert and oriented x3, respirations even and unlabored on room air. Pt placed on pulse oximetry monitoring.

## 2025-05-12 NOTE — ED ADULT NURSE NOTE - NSFALLRISKINTERV_ED_ALL_ED

## 2025-05-12 NOTE — ED PROVIDER NOTE - OBJECTIVE STATEMENT
sleep 5/4/23   Rollen Ingles, DO   lisinopril (PRINIVIL;ZESTRIL) 20 MG tablet Take 1 tablet by mouth daily 5/1/23   Rollen Ingles, DO   rosuvastatin (CRESTOR) 5 MG tablet Take 1 tablet by mouth nightly 5/1/23   Rollen Ingles, DO   Multiple Vitamins-Minerals (MULTIVITAMIN ADULTS 50+) TABS Take by mouth    Historical Provider, MD     Coumadin Use Last 7 Days:  no  Antiplatelet drug therapy use last 7 days: no  Other anticoagulant use last 7 days: no  Additional Medication Information:  n/a      Pre-Sedation Documentation and Exam:   Vital signs have been reviewed (see flow sheet for vitals).     Mallampati Airway Assessment:  Mallampati Class III - (soft palate & base of uvula are visible)    Prior History of Anesthesia Complications:   none    ASA Classification:  Class 3 - A patient with severe systemic disease that limits activity but is not incapacitating    Sedation/ Anesthesia Plan:   intravenous sedation    Medications Planned:   midazolam (Versed) intravenously and fentanyl intravenously    Patient is an appropriate candidate for plan of sedation: yes    Electronically signed by Alon Delarosa MD on 5/31/2023 at 1:34 PM
Patient with history of recurrent UTIs, HTN, HLD, CVA without residual deficit, presents with altered mental status for the past 3 days.  Patient has had decreased p.o. intake.  Patient states she has had very poor appetite.  Had 1 episode of vomiting.  No diarrhea.  Has some generalized abdominal discomfort.  No fever or chills.  No dysuria or hematuria.  No chest pain or shortness of breath.  At baseline, patient lives with her son and ambulates with a walker.

## 2025-05-12 NOTE — ED PROVIDER NOTE - NSICDXPASTMEDICALHX_GEN_ALL_CORE_FT
PAST MEDICAL HISTORY:  CVA (cerebrovascular accident)     Hyperlipidemia     Hypertension     Hypothyroidism

## 2025-05-13 ENCOUNTER — TRANSCRIPTION ENCOUNTER (OUTPATIENT)
Age: 85
End: 2025-05-13

## 2025-05-13 DIAGNOSIS — R82.71 BACTERIURIA: ICD-10-CM

## 2025-05-13 DIAGNOSIS — R10.32 LEFT LOWER QUADRANT PAIN: ICD-10-CM

## 2025-05-13 DIAGNOSIS — D64.9 ANEMIA, UNSPECIFIED: ICD-10-CM

## 2025-05-13 DIAGNOSIS — E03.9 HYPOTHYROIDISM, UNSPECIFIED: ICD-10-CM

## 2025-05-13 DIAGNOSIS — E88.09 OTHER DISORDERS OF PLASMA-PROTEIN METABOLISM, NOT ELSEWHERE CLASSIFIED: ICD-10-CM

## 2025-05-13 DIAGNOSIS — S32.000A WEDGE COMPRESSION FRACTURE OF UNSPECIFIED LUMBAR VERTEBRA, INITIAL ENCOUNTER FOR CLOSED FRACTURE: ICD-10-CM

## 2025-05-13 DIAGNOSIS — E78.5 HYPERLIPIDEMIA, UNSPECIFIED: ICD-10-CM

## 2025-05-13 DIAGNOSIS — K21.9 GASTRO-ESOPHAGEAL REFLUX DISEASE WITHOUT ESOPHAGITIS: ICD-10-CM

## 2025-05-13 DIAGNOSIS — I10 ESSENTIAL (PRIMARY) HYPERTENSION: ICD-10-CM

## 2025-05-13 DIAGNOSIS — Z29.9 ENCOUNTER FOR PROPHYLACTIC MEASURES, UNSPECIFIED: ICD-10-CM

## 2025-05-13 PROCEDURE — 72100 X-RAY EXAM L-S SPINE 2/3 VWS: CPT | Mod: 26

## 2025-05-13 PROCEDURE — 99223 1ST HOSP IP/OBS HIGH 75: CPT | Mod: GC

## 2025-05-13 RX ORDER — AMITRIPTYLINE HYDROCHLORIDE 25 MG/1
25 TABLET, FILM COATED ORAL EVERY 24 HOURS
Refills: 0 | Status: DISCONTINUED | OUTPATIENT
Start: 2025-05-13 | End: 2025-05-14

## 2025-05-13 RX ORDER — ROSUVASTATIN CALCIUM 20 MG/1
20 TABLET, FILM COATED ORAL AT BEDTIME
Refills: 0 | Status: DISCONTINUED | OUTPATIENT
Start: 2025-05-13 | End: 2025-05-15

## 2025-05-13 RX ORDER — LISINOPRIL 5 MG/1
5 TABLET ORAL EVERY 24 HOURS
Refills: 0 | Status: DISCONTINUED | OUTPATIENT
Start: 2025-05-13 | End: 2025-05-15

## 2025-05-13 RX ORDER — BISACODYL 5 MG
10 TABLET, DELAYED RELEASE (ENTERIC COATED) ORAL DAILY
Refills: 0 | Status: DISCONTINUED | OUTPATIENT
Start: 2025-05-13 | End: 2025-05-15

## 2025-05-13 RX ORDER — ENOXAPARIN SODIUM 100 MG/ML
30 INJECTION SUBCUTANEOUS EVERY 24 HOURS
Refills: 0 | Status: DISCONTINUED | OUTPATIENT
Start: 2025-05-13 | End: 2025-05-15

## 2025-05-13 RX ORDER — POLYETHYLENE GLYCOL 3350 17 G/17G
17 POWDER, FOR SOLUTION ORAL EVERY 12 HOURS
Refills: 0 | Status: DISCONTINUED | OUTPATIENT
Start: 2025-05-13 | End: 2025-05-15

## 2025-05-13 RX ORDER — LEVOTHYROXINE SODIUM 300 MCG
88 TABLET ORAL EVERY 24 HOURS
Refills: 0 | Status: DISCONTINUED | OUTPATIENT
Start: 2025-05-13 | End: 2025-05-15

## 2025-05-13 RX ORDER — ASPIRIN 325 MG
81 TABLET ORAL DAILY
Refills: 0 | Status: DISCONTINUED | OUTPATIENT
Start: 2025-05-13 | End: 2025-05-15

## 2025-05-13 RX ADMIN — ROSUVASTATIN CALCIUM 20 MILLIGRAM(S): 20 TABLET, FILM COATED ORAL at 23:15

## 2025-05-13 RX ADMIN — Medication 20 MILLIGRAM(S): at 12:11

## 2025-05-13 RX ADMIN — Medication 10 MILLIGRAM(S): at 18:28

## 2025-05-13 RX ADMIN — LISINOPRIL 5 MILLIGRAM(S): 5 TABLET ORAL at 07:06

## 2025-05-13 RX ADMIN — Medication 88 MICROGRAM(S): at 07:06

## 2025-05-13 RX ADMIN — Medication 81 MILLIGRAM(S): at 12:11

## 2025-05-13 RX ADMIN — POLYETHYLENE GLYCOL 3350 17 GRAM(S): 17 POWDER, FOR SOLUTION ORAL at 07:07

## 2025-05-13 RX ADMIN — ENOXAPARIN SODIUM 30 MILLIGRAM(S): 100 INJECTION SUBCUTANEOUS at 12:10

## 2025-05-13 RX ADMIN — POLYETHYLENE GLYCOL 3350 17 GRAM(S): 17 POWDER, FOR SOLUTION ORAL at 18:28

## 2025-05-13 RX ADMIN — AMITRIPTYLINE HYDROCHLORIDE 25 MILLIGRAM(S): 25 TABLET, FILM COATED ORAL at 12:10

## 2025-05-13 NOTE — PHYSICAL THERAPY INITIAL EVALUATION ADULT - ADDITIONAL COMMENTS
Pt lives in a co-op with her son, no stairs to enter. Pt reports her son works during the day and she has an aide at 1pm-5pm x 5 days per week. Pt ambulates with rollator at baseline. Pt reports she was receiving physical therapy at home prior to admission and would like to continue.

## 2025-05-13 NOTE — H&P ADULT - PROBLEM SELECTOR PLAN 8
Hb 9.8 (baseline 8.4-9.8) MCV 82.9 no active s/s bleeding. Denies hemoptysis. Iron panel 8/2023 iron 17 TIBC 272 %sat 6 ferritin 14    - trend CBC  - f/u iron panel, ferritin  - maintain 2 large bore IVs, active T&S

## 2025-05-13 NOTE — H&P ADULT - PROBLEM SELECTOR PLAN 4
CT A/P shows Mild L1 compression fracture which patient reports is known. Currently without back pain    - continue to monitor if patient notes pain can trial tylenol, consider calling ortho for back brace

## 2025-05-13 NOTE — H&P ADULT - TIME BILLING
coordination of care; preparing to see Pt.; interviewing Pt.; examining Pt.; reviewing labs and images; documentation in [place]; d/w Medicine resident on rounds

## 2025-05-13 NOTE — H&P ADULT - NSHPLABSRESULTS_GEN_ALL_CORE
.  LABS:                         9.8    10.00 )-----------( 212      ( 12 May 2025 13:56 )             33.4     05-12    142  |  107  |  13  ----------------------------<  107[H]  3.7   |  26  |  0.67    Ca    9.4      12 May 2025 13:56    TPro  6.4  /  Alb  3.2[L]  /  TBili  0.4  /  DBili  x   /  AST  14[L]  /  ALT  11[L]  /  AlkPhos  47  -12      Urinalysis Basic - ( 12 May 2025 13:56 )    Color: Yellow / Appearance: Cloudy / S.063 / pH: x  Gluc: 107 mg/dL / Ketone: x  / Bili: Negative / Urobili: 1.0 mg/dL   Blood: x / Protein: 30 mg/dL / Nitrite: Positive   Leuk Esterase: Negative / RBC: 0 /HPF / WBC 3 /HPF   Sq Epi: x / Non Sq Epi: x / Bacteria: Too Numerous to count /HPF                RADIOLOGY, EKG & ADDITIONAL TESTS: Reviewed.

## 2025-05-13 NOTE — H&P ADULT - ATTENDING COMMENTS
Pt. seen and examined by me earlier today; I have read Dr. Patel's H&P, I agree w/ her findings and plan of care as documented, w/ updates; this morning, Pt. reports her abdominal pain and N/V have resolved; Pt. reports poor appetite; Pt. denies F/C, dysuria, frequency, urgency; VSS; labs and images reviewed; UA w/ (-) LE and < 5 WBCs; CT A/P unremarkable, shows "no bowel obstruction or inflammation; extensive sigmoid diverticulosis without evidence of acute diverticulitis;" L-spine x-ray shows "no acute loss vertebral body height; chronic appearing L1 compression deformity;" plan to consult Ortho prior to PT/OT eval, Pt. may benefit from TLSO brace; no e/o UTI, abx no indicated; PO intake encouraged, can advance diet as tolerated, will calculate BMI and consult Nutrition; Pt. otherwise medically ready for d/c, pending Ortho and PT/OT assessments

## 2025-05-13 NOTE — DISCHARGE NOTE PROVIDER - CARE PROVIDER_API CALL
rogelio Garcia  [facility]  [address] [place] [address], [address], [place] [address]  Phone  [phone]  Phone: [phone]  Fax: (   )    -  Established Patient  Follow Up Time: 1 week   rogelio Garcia  [facility]  [address] [place] [address], [address], [place] [address]  Phone  [phone]  Phone: [phone]  Fax: (   )    -  Established Patient  Follow Up Time: 1 week    William Rodrigues  Urology  N  ARMANI BERGER, Phys,    Phone: ()-  Fax: ()-  Established Patient  Follow Up Time: 1 week

## 2025-05-13 NOTE — CONSULT NOTE ADULT - SUBJECTIVE AND OBJECTIVE BOX
Orthopaedic Surgery Consult Note    CC: Patient is a 84y old  Female who presents with a chief complaint of LUQ pain  (13 May 2025 14:10), found to have L1 compression fracture on CT abdomen     HPI: 84y old Female PMHx CVA x 2 (residual left leg weakness from CVA 8 years ago, right arm weakness from CVA 2 years ago), recurrent UTI,  admitted to Medicine for abdominal pain, likely diverticulosis, found to have L1 compression fracture on CT abdomen. Ortho consulted for recommendations. Pt endorses she has known about compression fracture for several years, attributes it to osteoporosis and notes she started OP treatment last year. At baseline it does not cause her much pain. She normally sits on the couch with pillows propped behind her back which keeps the pain at bay.  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 Pain is to left lower back and non-radiating. She ambulates with a rolling walker at home. She is incontinent at baseline for years, unchanged recently. No numbness or tingling to her BLE.     ROS: Positive for left sided low back pain   Denies fevers, chills, headache, dizziness, chest pain, shortness of breath, nausea, vomiting.   All other systems negative, except for above.     Allergies: Bactrim (Unknown)  oxycodone (Unknown)    Intolerances: noncontributory       PAST MEDICAL & SURGICAL HISTORY:  Hypothyroidism  Hypertension  Hyperlipidemia  CVA (cerebrovascular accident)  H/O abdominal surgery  H/O: hysterectomy    Social History:  56PPY history, quit 20 years. Denies alcohol, illicit drug use (13 May 2025 05:58)    FAMILY HISTORY: noncontributory   Medications: noncontributory     Vital Signs Last 24 Hrs  T(C): 36.6 (13 May 2025 12:05), Max: 37.1 (12 May 2025 17:16)  T(F): 97.9 (13 May 2025 12:05), Max: 98.8 (12 May 2025 17:16)  HR: 72 (13 May 2025 14:15) (67 - 76)  BP: 97/60 (13 May 2025 14:15) (97/60 - 129/79)  BP(mean): 82 (13 May 2025 12:05) (82 - 82)  RR: 16 (13 May 2025 12:05) (16 - 18)  SpO2: 95% (13 May 2025 14:15) (91% - 96%)    Parameters below as of 13 May 2025 14:15  Patient On (Oxygen Delivery Method): room air    PE:  General: comfortable, pleasant in no acute distress, lying  in bed   Neuro: Alert, oriented x 3 to person/place/year   Psych: Normal mood & affect   Skin: Warm, dry, extremities well perfused, no obvious rash  MSK:    -Inspection/palpation: No TTP to thoracolumbar spine or paraspinal muscles. No wounds or obvious deformity.     -Sensation: intact and equal BLE    -Motor: Negative SLR bilaterally. EHL/TA/GS/FHL 5/5 BLE.  LLE 4/5 quad/hamstring (baseline since CVA 8 yrs ago per pt), RLE 5/5 quad/ham     -ROM: full knee ROM active and passive BLE. decreased ROM lumbar spine secondary to deconditioning and pain     -Pulses: dp palpable BLE, skin warm and well perfused, cap refill brisk, calve soft and nontender BLE                         9.8    10.00 )-----------( 212      ( 12 May 2025 13:56 )             33.4     05-12    142  |  107  |  13  ----------------------------<  107[H]  3.7   |  26  |  0.67    Ca    9.4      12 May 2025 13:56  TPro  6.4  /  Alb  3.2[L]  /  TBili  0.4  /  DBili  x   /  AST  14[L]  /  ALT  11[L]  /  AlkPhos  47  05-12        Imaging: ct< from: CT Abdomen and Pelvis w/ IV Cont (05.12.25 @ 18:23) >  IMPRESSION:  No bowel obstruction or inflammation. Extensive sigmoid diverticulosis   without evidence of acute diverticulitis.    Age indeterminate mild L1 compression fracture. Correlate for focal   tenderness on physical exam to determine acuity.    < end of copied text >    A/P: 84yFemale with known chronic L1 compression fracture  Pain control PRN  Brace for comfort, pt does not need to wear it at all times- for comfort only   If pt elects for bracing, can order off the shelf LSO, primary team to update on pt's decision and we can order from [facility] at that time  Reviewed imaging and lab data   Risks and benefits were discussed for above   Above plan discussed with Dr. Pastor   Ortho Pager [phone]    40 minutes spent on total encounter, over 50% of the visit was spent counseling and/or coordinating care.   
  Patient is a 84y old  Female who presents with a chief complaint of LLQ pain (13 May 2025 07:41)      HPI:  84F PMH CVA (residual gait disturbance and LLE weakness, ambulates with walker), HTN, HLD, hypothyroidism, chronic UTI p/w LLQ abdominal pain and non bloody emesis episode x1. She reports she has had intermittent LLQ pain which her PCP told her could be related to osteoporosis. Reports she has experienced constipation tried taking metamucil which did not help. Reports some dysuria when she went to restroom yesterday otherwise she takes ellura (soluble proanthocyanidins- cranberry extract) for her history of UTI.  She reports she had one episode of non bloody emesis but not feels fine and wants to eat. Denies history of dysphagia or coughing after she eats. Her PCP told her she has a lumbar compression fracture and has never had a brace. She lives with her son Chepe Triplett ([phone]) and ambulates with a walker. Denies fever, chills, cough, n/v, chest pain, diarrhea    ED   Vitals afebrile, HR 67, 123/68, RR17, 96% on 2L NC  Labs Hb 9.8 MCV 82.9, albumin 3.2, UA spec grav 1.063, protein 30, nitrite positive, small blood bacteria numerous WBC 3  Imaging CXR no acute infiltrates CT A/P with IV contrast bibasilar atelectasis, mild L1 compression fx, extensive sigmoid diverticulosis without evidence of diverticulitis, ncCTH shows Mild periventricular white matter ischemia.  Old infarction in the R cerebellum. Mild to moderate frontal and parietal lobe atrophy.  Inteventions CTX 1g, NS 500cc   (13 May 2025 05:58)    PAST MEDICAL & SURGICAL HISTORY:  Hypothyroidism      Hypertension      Hyperlipidemia      CVA (cerebrovascular accident)      H/O abdominal surgery      H/O: hysterectomy        MEDICATIONS  (STANDING):  amitriptyline 25 milliGRAM(s) Oral every 24 hours  aspirin  chewable 81 milliGRAM(s) Oral daily  enoxaparin Injectable 30 milliGRAM(s) SubCutaneous every 24 hours  famotidine    Tablet 20 milliGRAM(s) Oral daily  levothyroxine 88 MICROGram(s) Oral every 24 hours  lisinopril 5 milliGRAM(s) Oral every 24 hours  polyethylene glycol 3350 17 Gram(s) Oral every 12 hours  rosuvastatin 20 milliGRAM(s) Oral at bedtime    MEDICATIONS  (PRN):  bisacodyl Suppository 10 milliGRAM(s) Rectal daily PRN Constipation      FAMILY HISTORY:      CBC Full  -  ( 12 May 2025 13:56 )  WBC Count : 10.00 K/uL  RBC Count : 4.03 M/uL  Hemoglobin : 9.8 g/dL  Hematocrit : 33.4 %  Platelet Count - Automated : 212 K/uL  Mean Cell Volume : 82.9 fl  Mean Cell Hemoglobin : 24.3 pg  Mean Cell Hemoglobin Concentration : 29.3 g/dL  Auto Neutrophil # : 8.83 K/uL  Auto Lymphocyte # : 0.31 K/uL  Auto Monocyte # : 0.80 K/uL  Auto Eosinophil # : 0.00 K/uL  Auto Basophil # : 0.02 K/uL  Auto Neutrophil % : 88.3 %  Auto Lymphocyte % : 3.1 %  Auto Monocyte % : 8.0 %  Auto Eosinophil % : 0.0 %  Auto Basophil % : 0.2 %          142  |  107  |  13  ----------------------------<  107[H]  3.7   |  26  |  0.67    Ca    9.4      12 May 2025 13:56    TPro  6.4  /  Alb  3.2[L]  /  TBili  0.4  /  DBili  x   /  AST  14[L]  /  ALT  11[L]  /  AlkPhos  47  05-12      Urinalysis Basic - ( 12 May 2025 13:56 )    Color: Yellow / Appearance: Cloudy / S.063 / pH: x  Gluc: 107 mg/dL / Ketone: x  / Bili: Negative / Urobili: 1.0 mg/dL   Blood: x / Protein: 30 mg/dL / Nitrite: Positive   Leuk Esterase: Negative / RBC: 0 /HPF / WBC 3 /HPF   Sq Epi: x / Non Sq Epi: x / Bacteria: Too Numerous to count /HPF        Radiology :       ACC: [id number] EXAM:  CT BRAIN   ORDERED BY: BORA GRANDE     PROCEDURE DATE:  2025          INTERPRETATION:  CT head without IV contrast    CLINICAL INFORMATION: Altered mental status    TECHNIQUE: Contiguous axial 5 mm sections were obtained through the head.   Sagittal and coronal 2-D reformatted images were also obtained.   This   scan was performed using automatic exposure control (radiation dose   reduction software) to obtain a diagnostic image quality scan with   patient dose as low as reasonably achievable.    FINDINGS:   No previous examinations are available for review.    The brain demonstrates mild periventricular white matter ischemia.  Old   infarction in the RIGHT cerebellum. No acute cerebral cortical infarct is   seen.  No intracranial hemorrhage is found.  No mass effect is found in   the brain.    The ventricles, sulci and basal cisterns show mild to moderate frontal   and parietal lobe atrophy.    The orbits are unremarkable.  The paranasal sinuses are clear. The nasal   cavity appears intact.  The nasopharynx is symmetric.  The central skull   base, petrous temporal bones and calvarium remain intact.      IMPRESSION:   Mild periventricular white matter ischemia.  Old infarction   in the RIGHT cerebellum.    Mild to moderate frontal and parietal lobe   atrophy.        ACC: [id number] EXAM:  CT ABDOMEN AND PELVIS IC   ORDERED BY: BORA GRANDE     PROCEDURE DATE:  2025          INTERPRETATION:  CLINICAL INFORMATION: Abdominal pain    COMPARISON: None.    CONTRAST/COMPLICATIONS:  IV Contrast: Omnipaque [id number] cc administered   10 cc discarded  Oral Contrast: NONE  .    PROCEDURE:  CT of the Abdomen and Pelvis was performed.  Sagittal and coronal reformats were performed.    FINDINGS:  LOWER CHEST: Bibasilar atelectasis.    LIVER: Subcentimeter hypodensities too small to characterize.  BILE DUCTS: Normal caliber.  GALLBLADDER: Within normal limits.  SPLEEN: Within normal limits.  PANCREAS: Within normal limits.  ADRENALS: Within normal limits.  KIDNEYS/URETERS: No hydronephrosis or obstructive renalcalculi.   Bilateral renal cysts and subcentimeter hypodensities too small to   characterize.    BLADDER: Within normal limits.  REPRODUCTIVE ORGANS: Hysterectomy.    BOWEL: No bowel obstruction. Appendix is not visualized. No evidence of   inflammation in the pericecal region. Extensive sigmoid diverticulosis   without evidence of acute diverticulitis.  PERITONEUM/RETROPERITONEUM: Trace pelvic free fluid.  VESSELS: Atherosclerotic changes.  LYMPH NODES: No lymphadenopathy.  ABDOMINAL WALL: Within normal limits.  BONES: Osteopenia. Degenerative changes. Mild L1 compression fracture.    IMPRESSION:  No bowel obstruction or inflammation. Extensive sigmoid diverticulosis   without evidence of acute diverticulitis.    Age indeterminate mild L1 compression fracture. Correlate for focal   tenderness on physical exam to determine acuity.        Review of Systems : per HPI         Vital Signs Last 24 Hrs  T(C): 37.1 (13 May 2025 06:18), Max: 37.1 (12 May 2025 17:16)  T(F): 98.8 (13 May 2025 06:18), Max: 98.8 (12 May 2025 17:16)  HR: 70 (13 May 2025 06:18) (67 - 79)  BP: 114/64 (13 May 2025 06:18) (102/50 - 129/79)  BP(mean): --  RR: 18 (13 May 2025 06:18) (17 - 18)  SpO2: 91% (13 May 2025 06:18) (91% - 96%)    Parameters below as of 13 May 2025 01:29  Patient On (Oxygen Delivery Method): nasal cannula  O2 Flow (L/min): 2          Physical Exam:  84 y o woman lying comfortably in semi Quinn's position , awake , alert , no acute complaints     Head: normocephalic , atraumatic    Eyes: PERRLA , EOMI , no nystagmus , sclera anicteric    ENT / FACE: neg nasal discharge , uvula midline , no oropharyngeal erythema / exudate    Neck: supple , negative JVD , negative carotid bruits , no thyromegaly    Chest: CTA bilaterally     Cardiovascular: regular rate and rhythm , neg murmurs / rubs / gallops    Abdomen: soft , non distended , LLQ tenderness to palpation , neg r/g ,  normal bowel sounds     Extremities: WWP , neg cyanosis /clubbing / edema     Neurologic Exam:     Alert and oriented to person , place , date/year     Cranial Nerves:           II:                         pupils equal , round and reactive to light , visual fields intact         III/ IV/VI:             extraocular movements intact , neg nystagmus , neg ptosis        V:                        facial sensation intact , V1-3 normal        VII:                      face symmetric , no droop , normal eye closure and smile        VIII:                     hearing intact to finger rub bilaterally        IX and X:             no hoarseness , gag intact , palate/ uvula rise symmetrically        XI:                       SCM / trapezius strength intact bilateral        XII:                      no tongue deviation    Motor Exam:        > 3+/5 x 4 extremities , without drift     Sensation:         intact to light touch x 4 extremities                            no neglect or extinction on double simultaneous testing    DTR:           biceps/brachioradialis: equal                            patella/ankle: equal          neg Babinski      Coordination:            Finger to Nose:  neg dysmetria bilaterally        Gait:  not tested         PM&R Impression: admitted for abdominal pain    - no acute pathology on CT brain imaging     - deconditioned    - no focal weakness        Recommendations / Plan:       1) Physical / Occupational therapy focusing on therapeutic exercises , equipment evaluation , bed mobility/transfer out of bed evaluation , progressive ambulation with assistive devices prn .    2) Current disposition plan recommendation:    pending functional progress

## 2025-05-13 NOTE — PATIENT PROFILE ADULT - FALL HARM RISK - HARM RISK INTERVENTIONS

## 2025-05-13 NOTE — DISCHARGE NOTE PROVIDER - PROVIDER TOKENS
FREE:[LAST:[Garcia],FIRST:[rogelio],PHONE:[phone]],FAX:[(   )    -],ADDRESS:[[facility]  [address] [place] [address], [address] the [facility], [place] [address]  Phone  [phone]],FOLLOWUP:[1 week],ESTABLISHEDPATIENT:[T]] FREE:[LAST:[Garcia],FIRST:[rogelio],PHONE:[phone]],FAX:[(   )    -],ADDRESS:[[facility]  [address] [place] [address], [address] [address]  Phone  [phone]],FOLLOWUP:[1 week],ESTABLISHEDPATIENT:[T]],PROVIDER:[TOKEN:[[id number]],FOLLOWUP:[1 week],ESTABLISHEDPATIENT:[T]]

## 2025-05-13 NOTE — H&P ADULT - HISTORY OF PRESENT ILLNESS
ED   Vitals afebrile, HR 67, 123/68, RR17, 96% on 2L NC  Labs Hb 9.8 MCV 82.9, albumin 3.2, UA spec grav 1.063, protein 30, nitrite positive, small blood bacteria numberous WBC 3  Imaging CXR no acute infiltrates CT A/P with IV contrast bibasilar atelectasis, mild L1 compression fx, extensive sigmoid diverticulosis without evidence of diverticulitis, ncCTH shows Mild periventricular white matter ischemia.  Old infarction   in the RIGHT cerebellum.    Mild to moderate frontal and parietal lobe   atrophy.  Inteventions CTX 1g, NS 500cc   84F PMH CVA (residual gait disturbance and LLE weakness, ambulates with walker), HTN, HLD, hypothyroidism, chronic UTI p/w LLQ abdominal painand non bloody emesis episode x1. She reports she has had intermittent LLQ pain which her PCP told her could be related to osteoporosis. Reports she has experienced constipation tried taking metamucil which did not help Reports some dysuria when she went to restroom yesterday otherwise she takes ellura (soluble proanthocyanidins- cranberry extract) for her history of UTI.  She reports she had one episode of non bloody emesis but not feels fine and wants to eat. Denies history of dysphagia or coughing after she eats. Her PCP told her she has a lumbar compression fracture and has never had a brace. She lives with her son Chepe Triplett ([phone]) and ambulates with a walker    ED   Vitals afebrile, HR 67, 123/68, RR17, 96% on 2L NC  Labs Hb 9.8 MCV 82.9, albumin 3.2, UA spec grav 1.063, protein 30, nitrite positive, small blood bacteria numerous WBC 3  Imaging CXR no acute infiltrates CT A/P with IV contrast bibasilar atelectasis, mild L1 compression fx, extensive sigmoid diverticulosis without evidence of diverticulitis, ncCTH shows Mild periventricular white matter ischemia.  Old infarction   in the RIGHT cerebellum.    Mild to moderate frontal and parietal lobe   atrophy.  Inteventions CTX 1g, NS 500cc   84F PMH CVA (residual gait disturbance and LLE weakness, ambulates with walker), HTN, HLD, hypothyroidism, chronic UTI p/w LLQ abdominal pain and non bloody emesis episode x1. She reports she has had intermittent LLQ pain which her PCP told her could be related to osteoporosis. Reports she has experienced constipation tried taking metamucil which did not help. Reports some dysuria when she went to restroom yesterday otherwise she takes ellura (soluble proanthocyanidins- cranberry extract) for her history of UTI.  She reports she had one episode of non bloody emesis but not feels fine and wants to eat. Denies history of dysphagia or coughing after she eats. Her PCP told her she has a lumbar compression fracture and has never had a brace. She lives with her son Chepe Triplett ([phone]) and ambulates with a walker. Denies fever, chills, cough, n/v, chest pain, diarrhea    ED   Vitals afebrile, HR 67, 123/68, RR17, 96% on 2L NC  Labs Hb 9.8 MCV 82.9, albumin 3.2, UA spec grav 1.063, protein 30, nitrite positive, small blood bacteria numerous WBC 3  Imaging CXR no acute infiltrates CT A/P with IV contrast bibasilar atelectasis, mild L1 compression fx, extensive sigmoid diverticulosis without evidence of diverticulitis, ncCTH shows Mild periventricular white matter ischemia.  Old infarction   in the RIGHT cerebellum.    Mild to moderate frontal and parietal lobe   atrophy.  Inteventions CTX 1g, NS 500cc   84F PMH CVA (residual gait disturbance and LLE weakness, ambulates with walker), HTN, HLD, hypothyroidism, chronic UTI p/w LLQ abdominal pain and non bloody emesis episode x1. She reports she has had intermittent LLQ pain which her PCP told her could be related to osteoporosis. Reports she has experienced constipation tried taking metamucil which did not help. Reports some dysuria when she went to restroom yesterday otherwise she takes ellura (soluble proanthocyanidins- cranberry extract) for her history of UTI.  She reports she had one episode of non bloody emesis but not feels fine and wants to eat. Denies history of dysphagia or coughing after she eats. Her PCP told her she has a lumbar compression fracture and has never had a brace. She lives with her son Chepe Triplett ([phone]) and ambulates with a walker. Denies fever, chills, cough, n/v, chest pain, diarrhea    ED   Vitals afebrile, HR 67, 123/68, RR17, 96% on 2L NC  Labs Hb 9.8 MCV 82.9, albumin 3.2, UA spec grav 1.063, protein 30, nitrite positive, small blood bacteria numerous WBC 3  Imaging CXR no acute infiltrates CT A/P with IV contrast bibasilar atelectasis, mild L1 compression fx, extensive sigmoid diverticulosis without evidence of diverticulitis, ncCTH shows Mild periventricular white matter ischemia.  Old infarction in the R cerebellum. Mild to moderate frontal and parietal lobe atrophy.  Inteventions CTX 1g, NS 500cc

## 2025-05-13 NOTE — DISCHARGE NOTE PROVIDER - NSDCCPCAREPLAN_GEN_ALL_CORE_FT
PRINCIPAL DISCHARGE DIAGNOSIS  Diagnosis: Abdominal pain  Assessment and Plan of Treatment: You came to the hopsital due to worsening abdominal pain with an episode of vomiting, CT imaging showed diverticulosis which is beset described by an outpouching or balloning of the colon commonly caused by constipation. Your symptoms resolved on their own and you are toleraing food well.  Your CT scan also showed a compression fracture of of the verterbrae in your back, we discussed this finding with our orthopedic surgery team and   We have not made any changes to your home medications, please  continue to take as prescribed. Please follow up with your primary care provider within the next two weeks. It was a pleasure caring for you     PRINCIPAL DISCHARGE DIAGNOSIS  Diagnosis: Abdominal pain  Assessment and Plan of Treatment: You came to the hopsital due to worsening abdominal pain with an episode of vomiting, CT imaging showed diverticulosis which is best described by an outpouching or balloning of the colon commonly caused by constipation. Your symptoms resolved on their own and you are toleraing food well.  Your CT scan also showed a compression fracture of of the verterbrae in your back, we discussed this finding with our orthopedic surgery team and no surgical intervention was recommended. Your stay was complicated by suprapubic pain likely from urinary retention. You were able to void after stopped the amitripyline. Please follow-up with your PCP and uro/gyn within 1 week of discharge.   We have not made any changes to your home medications, please  continue to take as prescribed.

## 2025-05-13 NOTE — PHYSICAL THERAPY INITIAL EVALUATION ADULT - PERTINENT HX OF CURRENT PROBLEM, REHAB EVAL
84F PMH CVA (residual gait disturbance and LLE weakness, ambulates with walker), HTN, HLD, hypothyroidism, chronic UTI p/w L sided hip pain and non bloody emesis episode x1 admitted for abdominal pain diagnosis and management 84F PMH CVA (residual gait disturbance and LLE weakness, ambulates with walker), HTN, HLD, hypothyroidism, chronic UTI p/w L sided hip pain and non bloody emesis episode x1 admitted for abdominal pain diagnosis and management. Pt also with noted L1 compression fx, cleared for PT IE by Intern Yuliya

## 2025-05-13 NOTE — DISCHARGE NOTE PROVIDER - NSDCMRMEDTOKEN_GEN_ALL_CORE_FT
amitriptyline 25 mg oral tablet: 1 orally once a day  aspirin 81 mg oral capsule: 1 orally once a day  famotidine 20 mg oral tablet: 1 orally once a day  levothyroxine 88 mcg (0.088 mg) oral tablet: 1 orally once a day  lisinopril 5 mg oral tablet: 1 tab(s) orally once a day  raloxifene 60 mg oral tablet: 1 tab(s) orally once a day  rosuvastatin 20 mg oral tablet: 1 orally once a day   aspirin 81 mg oral capsule: 1 orally once a day  famotidine 20 mg oral tablet: 1 orally once a day  levothyroxine 88 mcg (0.088 mg) oral tablet: 1 orally once a day  lisinopril 5 mg oral tablet: 1 tab(s) orally once a day  raloxifene 60 mg oral tablet: 1 tab(s) orally once a day  rosuvastatin 20 mg oral tablet: 1 orally once a day

## 2025-05-13 NOTE — CONSULT NOTE ADULT - ASSESSMENT
I M    84F PMH CVA (residual gait disturbance and LLE weakness, ambulates with walker), HTN, HLD, hypothyroidism, chronic UTI p/w L sided hip pain and non bloody emesis episode x1 admitted for abdominal pain diagnosis and management    Problem/Plan - 1:  ·  Problem: Left lower quadrant pain.   ·  Plan: Reports intermittent LLQ pain for past month and 1x vomiting yesterday, Has noted constipation recently for which she tried metamucil. Exam ND, + mild epigastric and LLQ TTP, +BS  CT A/P with IV contrast shows diverticulosis, stool in rectal vault.   LLQ pain likely 2/2 constipation vs diverticulosis  - give dulcolax suppository and start on miralax BID   - pt currently denies nausea, trial diet   - monitor BMs  - She reports chronic pain for which she takes Amitriptyline 25mg (QTc 437).    Problem/Plan - 2:  ·  Problem: Asymptomatic bacteriuria.   ·  Plan: Reports history of recurrent UTI takes ellura (cranberry extract) outpatient. Reports 1x episode of mild dysuria. UA spec grav 1.063, protein 30, nitrite positive, small blood bacteria numerous WBC 3  Pt currently denies dysuria, suprapubic tenderness. Meets 0/4 SIRS criteria    - for now will monitor off antibiotics   - f/u Ucx.    Problem/Plan - 3:  ·  Problem: CVA (cerebrovascular accident).   ·  Plan: History of CVA. ncCTH shows Mild periventricular white matter ischemia.  Old infarction in the RIGHT cerebellum. Reported history of LLE weakness however currently without any focal deficits     - continue to monitor.    Problem/Plan - 4:  ·  Problem: Lumbar compression fracture.   ·  Plan: CT A/P shows Mild L1 compression fracture which patient reports is known. Currently without back pain    - continue to monitor if patient notes pain can trial tylenol, consider calling ortho for back brace.    Problem/Plan - 5:  ·  Problem: Hypothyroidism.   ·  Plan: - cw home synthroid 88 mcg  - f/u TSH/fT4.    Problem/Plan - 6:  ·  Problem: Hypertension.   ·  Plan: - cw home lisinopril 5mg qd.    Problem/Plan - 7:  ·  Problem: Hyperlipidemia.   ·  Plan: - cw home rosuvastatin 20 qd.    Problem/Plan - 8:  ·  Problem: Normocytic anemia.   ·  Plan: Hb 9.8 (baseline 8.4-9.8) MCV 82.9 no active s/s bleeding. Denies hemoptysis. Iron panel 8/2023 iron 17 TIBC 272 %sat 6 ferritin 14    - trend CBC  - f/u iron panel, ferritin  - maintain 2 large bore IVs, active T&S.    Problem/Plan - 9:  ·  Problem: GERD (gastroesophageal reflux disease).   ·  Plan: - cw famotidine 20mg qd.    Problem/Plan - 10:  ·  Problem: Hypoalbuminemia.   ·  Plan; Albumin 3.2    - nutrition consult.    Problem/Plan - 11:  ·  Problem: Need for prophylactic measure.   ·  Plan: F: PO  E: Replete PRN  N: regular with ensure  Lines: pIV  DVT pphx: lovenox 40    CODE STATUS: full  DISPO: [facility].

## 2025-05-13 NOTE — H&P ADULT - ASSESSMENT
84F PMH CVA (residual gait disturbance and LLE weakness, ambulates with walker), HTN, HLD, hypothyroidism, chronic UTI p/w L sided hip pain and non bloody emesis episode x1 admitted  84F PMH CVA (residual gait disturbance and LLE weakness, ambulates with walker), HTN, HLD, hypothyroidism, chronic UTI p/w L sided hip pain and non bloody emesis episode x1 admitted for abdominal pain diagnosis and management

## 2025-05-13 NOTE — H&P ADULT - PROBLEM SELECTOR PLAN 3
History of CVA. ncCTH shows Mild periventricular white matter ischemia.  Old infarction in the RIGHT cerebellum. Reported history of LLE weakness however currently without any focal deficits     - continue to monitor

## 2025-05-13 NOTE — H&P ADULT - PROBLEM SELECTOR PLAN 2
Reports history of recurrent UTI takes ellura (cranberry extract) outpatient. Reports 1x episode of mild dysuria. UA spec grav 1.063, protein 30, nitrite positive, small blood bacteria numerous WBC 3  Pt currently denies dysuria, suprapubic tenderness. Meets 0/4 SIRS criteria    - for now will monitor off antibiotics   - f/u Ucx

## 2025-05-13 NOTE — PHYSICAL THERAPY INITIAL EVALUATION ADULT - PRECAUTIONS/LIMITATIONS, REHAB EVAL
fall precautions per Intern Yuliya, pt ok for PT IE, does not need back brace/fall precautions/spinal precautions

## 2025-05-13 NOTE — H&P ADULT - NSHPPHYSICALEXAM_GEN_ALL_CORE
.  VITAL SIGNS:  T(C): 36.9 (05-13-25 @ 01:29), Max: 37.1 (05-12-25 @ 17:16)  T(F): 98.4 (05-13-25 @ 01:29), Max: 98.8 (05-12-25 @ 17:16)  HR: 67 (05-13-25 @ 01:29) (67 - 79)  BP: 123/68 (05-13-25 @ 01:29) (102/50 - 129/79)  BP(mean): --  RR: 17 (05-13-25 @ 01:29) (17 - 18)  SpO2: 96% (05-13-25 @ 01:29) (91% - 96%)  Wt(kg): --    PHYSICAL EXAM:    Constitutional: Resting comfortably in bed; NAD  Head: NC/AT  Eyes: PERRL, EOMI, clear conjunctiva  ENT: no nasal discharge; uvula midline, no oropharyngeal erythema or exudates; MMM  Neck: supple; no JVD or thyromegaly  Respiratory: CTA B/L; no W/R/R, no retractions  Cardiac: +S1/S2; RRR; no M/R/G;  Gastrointestinal: soft, mildly tender in epigastric and LLQ region, ND; no rebound or guarding; +BSx4  Extremities: WWP, no clubbing or cyanosis; no peripheral edema  Musculoskeletal: NROM x4; no joint swelling, tenderness or erythema.  4/5 strength in BLE, no change in sensation  Vascular: 2+ radial, femoral, DP/PT pulses B/L  Dermatologic: skin warm, dry and intact; no rashes, wounds, or scars  Neurologic: AAOx4; CNII-XII grossly intact; no focal deficits  Psychiatric: affect and characteristics of appearance, verbalizations, behaviors are appropriate

## 2025-05-13 NOTE — H&P ADULT - PROBLEM SELECTOR PLAN 11
F: PO  E: Replete PRN  N: regular with ensure  Lines: pIV  DVT pphx: lovenox 40    CODE STATUS: full  DISPO: [facility]

## 2025-05-13 NOTE — PROGRESS NOTE ADULT - PROBLEM SELECTOR PLAN 1
Reports intermittent LLQ pain for past month and 1x vomiting yesterday, Has noted constipation recently for which she tried metamucil. Exam ND, + mild epigastric and LLQ TTP, +BS  CT A/P with IV contrast shows diverticulosis, stool in rectal vault.   LLQ pain likely 2/2 constipation vs diverticulosis  - give dulcolax suppository and start on miralax BID   - pt currently denies nausea, trial diet   - monitor BMs  - She reports chronic pain for which she takes Amitriptyline 25mg (QTc 437)

## 2025-05-13 NOTE — DISCHARGE NOTE PROVIDER - NSDCFUADDAPPT_GEN_ALL_CORE_FT
Reached out to PCP and Uro/gyn at [facility]. They will call you with an appt time. 
without difficulty

## 2025-05-13 NOTE — PATIENT PROFILE ADULT - FUNCTIONAL SCREEN CURRENT LEVEL: SWALLOWING (IF SCORE 2 OR MORE FOR ANY ITEM, CONSULT REHAB SERVICES), MLM)
0 = swallows foods/liquids without difficulty Continue Blake 3x daily (provides 2.5 gm protein ea) and Ensure Enlive 3x daily (1050 kyra and 60 gm protein)

## 2025-05-13 NOTE — H&P ADULT - PROBLEM SELECTOR PLAN 10
F: PO  E: Replete PRN  N: regular with ensure  Lines: pIV  DVT pphx: lovenox 40    CODE STATUS: full  DISPO: [facility] Albumin 3.2    - nutrition consult

## 2025-05-13 NOTE — PHYSICAL THERAPY INITIAL EVALUATION ADULT - STANDING BALANCE: DYNAMIC, REHAB EVAL
Incoming call from patient.  Patient states she has \"severe itching\" denies odor or vaginal discharge.  Patient has appt scheduled with Dr Garay 5/7 advised patient to keep appt, rx for diflucan sent to pharmacy, informed patient of walk in clinic 4/22 and to present to walk in if sx persists after medication.  Patient verbalized understanding, denied further questions.      fair minus

## 2025-05-13 NOTE — DISCHARGE NOTE PROVIDER - DETAILS OF MALNUTRITION DIAGNOSIS/DIAGNOSES
This patient has been assessed with a concern for Malnutrition and was treated during this hospitalization for the following Nutrition diagnosis/diagnoses:     -  05/14/2025: Mild protein-calorie malnutrition

## 2025-05-13 NOTE — H&P ADULT - PROBLEM SELECTOR PROBLEM 5
Post-Op Assessment Note      CV Status:  Stable    Mental Status:  Alert and awake    Hydration Status:  Euvolemic    PONV Controlled:  Controlled    Airway Patency:  Patent    Post Op Vitals Reviewed: Yes          Staff: CRNA           BP      Temp     Pulse     Resp      SpO2
Hypothyroidism

## 2025-05-13 NOTE — PROGRESS NOTE ADULT - PROBLEM SELECTOR PLAN 4
CT A/P shows Mild L1 compression fracture which patient reports is known. Currently without back pain    - continue to monitor if patient notes pain can trial tylenol, consider calling ortho for back brace CT A/P shows Mild L1 compression fracture which patient reports is known. Currently without back pain; denies recent falls or worsening gait instability, LE paresthesias    -ortho/spine consult to determine need for TSLO brace   - PT eval

## 2025-05-13 NOTE — H&P ADULT - PROBLEM SELECTOR PLAN 1
Reports intermittent LLQ pain for past month and 1x vomiting yesterday, Has noted constipation recently for which she tried metamucil. Exam ND, + mild epigastric and LLQ TTP, +BS  CT A/P with IV contrast shows diverticulosis, stool in rectal vault.   LLQ pain likely 2/2 constipation vs diverticulosis  - give dulcolax suppository and start on miralax BID   - pt currently denies nausea, trial diet   - monitor BMs Reports intermittent LLQ pain for past month and 1x vomiting yesterday, Has noted constipation recently for which she tried metamucil. Exam ND, + mild epigastric and LLQ TTP, +BS  CT A/P with IV contrast shows diverticulosis, stool in rectal vault.   LLQ pain likely 2/2 constipation vs diverticulosis  - give dulcolax suppository and start on miralax BID   - pt currently denies nausea, trial diet   - monitor BMs  - She reports chronic pain for which she takes Amitriptyline 25mg- f/u EKG Reports intermittent LLQ pain for past month and 1x vomiting yesterday, Has noted constipation recently for which she tried metamucil. Exam ND, + mild epigastric and LLQ TTP, +BS  CT A/P with IV contrast shows diverticulosis, stool in rectal vault.   LLQ pain likely 2/2 constipation vs diverticulosis  - give dulcolax suppository and start on miralax BID   - pt currently denies nausea, trial diet   - monitor BMs  - She reports chronic pain for which she takes Amitriptyline 25mg (QTc 437)

## 2025-05-13 NOTE — PROGRESS NOTE ADULT - SUBJECTIVE AND OBJECTIVE BOX
**INCOMPLETE NOTE    INTERVAL/OVERNIGHT EVENTS: None    SUBJECTIVE:  Patient seen and examined at bedside, comfortable, NAD. Denied fever, chest pain, dyspnea, abdominal pain.     Vital Signs Last 12 Hrs  T(F): 98.8 (25 @ 06:18), Max: 98.8 (25 @ 06:18)  HR: 70 (25 @ 06:18) (67 - 74)  BP: 114/64 (25 @ 06:18) (102/50 - 123/68)  BP(mean): --  RR: 18 (25 @ 06:18) (17 - 18)  SpO2: 91% (25 @ 06:18) (91% - 96%)  I&O's Summary      PHYSICAL EXAM:  General: NAD  HEENT: PERRL, EOM intact, sclera anicteric, MMM  Cardiovascular: RRR; no MRG; no JVD  Respiratory: CTAB; no WRR  GI/: soft; NTND; BS x4  Extremities: WWP; 2+ peripheral pulses bilaterally; no LE edema  Skin: normal color & turgor; no rash  Neurologic: aox3; no focal deficits    LABS:                        9.8    10.00 )-----------( 212      ( 12 May 2025 13:56 )             33.4     05-    142  |  107  |  13  ----------------------------<  107[H]  3.7   |  26  |  0.67    Ca    9.4      12 May 2025 13:56    TPro  6.4  /  Alb  3.2[L]  /  TBili  0.4  /  DBili  x   /  AST  14[L]  /  ALT  11[L]  /  AlkPhos  47  05-12      Urinalysis Basic - ( 12 May 2025 13:56 )    Color: Yellow / Appearance: Cloudy / S.063 / pH: x  Gluc: 107 mg/dL / Ketone: x  / Bili: Negative / Urobili: 1.0 mg/dL   Blood: x / Protein: 30 mg/dL / Nitrite: Positive   Leuk Esterase: Negative / RBC: 0 /HPF / WBC 3 /HPF   Sq Epi: x / Non Sq Epi: x / Bacteria: Too Numerous to count /HPF          RADIOLOGY & ADDITIONAL TESTS:    MEDICATIONS  (STANDING):  amitriptyline 25 milliGRAM(s) Oral every 24 hours  aspirin  chewable 81 milliGRAM(s) Oral daily  enoxaparin Injectable 30 milliGRAM(s) SubCutaneous every 24 hours  famotidine    Tablet 20 milliGRAM(s) Oral daily  levothyroxine 88 MICROGram(s) Oral every 24 hours  lisinopril 5 milliGRAM(s) Oral every 24 hours  polyethylene glycol 3350 17 Gram(s) Oral every 12 hours  rosuvastatin 20 milliGRAM(s) Oral at bedtime    MEDICATIONS  (PRN):  bisacodyl Suppository 10 milliGRAM(s) Rectal daily PRN Constipation   HOSPITAL COURSE:  Patient is a 84F PMH CVA (residual gait disturbance and LLE weakness, ambulates with walker denies recent falls), HTN, HLD, hypothyroidism, chronic UTI p/w L sided hip pain and non bloody emesis episode x1 found to have sigmoid diverticulosis on CT with complete resolution of symptoms course c/b L1 compression fx noted on CT; pending XR lumbar spine per ortho recs for further evalution; once cleared by ortho PT eval needed      INTERVAL/OVERNIGHT EVENTS: None    SUBJECTIVE:  Patient seen and examined at bedside, comfortable, NAD. Denied fever, chest pain, dyspnea, abdominal pain.     Vital Signs Last 12 Hrs  T(F): 98.8 (25 @ 06:18), Max: 98.8 (25 @ 06:18)  HR: 70 (25 @ :18) (67 - 74)  BP: 114/64 (25 @ 06:18) (102/50 - 123/68)  BP(mean): --  RR: 18 (25 @ 06:18) (17 - 18)  SpO2: 91% (25 @ 06:18) (91% - 96%)  I&O's Summary      PHYSICAL EXAM:  General: NAD  HEENT: PERRL, EOM intact, sclera anicteric, MMM  Cardiovascular: RRR; no MRG; no JVD  Respiratory: CTAB; no WRR  GI/: soft; NTND; BS x4  Extremities: WWP; 2+ peripheral pulses bilaterally; no LE edema  Skin: normal color & turgor; no rash  Neurologic: aox3; no focal deficits    LABS:                        9.8    10.00 )-----------( 212      ( 12 May 2025 13:56 )             33.4     05-12    142  |  107  |  13  ----------------------------<  107[H]  3.7   |  26  |  0.67    Ca    9.4      12 May 2025 13:56    TPro  6.4  /  Alb  3.2[L]  /  TBili  0.4  /  DBili  x   /  AST  14[L]  /  ALT  11[L]  /  AlkPhos  47  05-12      Urinalysis Basic - ( 12 May 2025 13:56 )    Color: Yellow / Appearance: Cloudy / S.063 / pH: x  Gluc: 107 mg/dL / Ketone: x  / Bili: Negative / Urobili: 1.0 mg/dL   Blood: x / Protein: 30 mg/dL / Nitrite: Positive   Leuk Esterase: Negative / RBC: 0 /HPF / WBC 3 /HPF   Sq Epi: x / Non Sq Epi: x / Bacteria: Too Numerous to count /HPF          RADIOLOGY & ADDITIONAL TESTS:    MEDICATIONS  (STANDING):  amitriptyline 25 milliGRAM(s) Oral every 24 hours  aspirin  chewable 81 milliGRAM(s) Oral daily  enoxaparin Injectable 30 milliGRAM(s) SubCutaneous every 24 hours  famotidine    Tablet 20 milliGRAM(s) Oral daily  levothyroxine 88 MICROGram(s) Oral every 24 hours  lisinopril 5 milliGRAM(s) Oral every 24 hours  polyethylene glycol 3350 17 Gram(s) Oral every 12 hours  rosuvastatin 20 milliGRAM(s) Oral at bedtime    MEDICATIONS  (PRN):  bisacodyl Suppository 10 milliGRAM(s) Rectal daily PRN Constipation

## 2025-05-13 NOTE — PHYSICAL THERAPY INITIAL EVALUATION ADULT - PLANNED THERAPY INTERVENTIONS, PT EVAL
balance training/bed mobility training/gait training/manual therapy techniques/neuromuscular re-education/postural re-education/ROM/strengthening/stretching/transfer training [id number] Comprehensive

## 2025-05-13 NOTE — PROGRESS NOTE ADULT - PROBLEM SELECTOR PLAN 11
F: PO  E: Replete PRN  N: regular with ensure  Lines: pIV  DVT pphx: lovenox 40    CODE STATUS: full  DISPO: [facility]

## 2025-05-13 NOTE — DISCHARGE NOTE PROVIDER - CARE PROVIDERS DIRECT ADDRESSES
,DirectAddress_Unknown ,DirectAddress_Unknown,[email or web address]

## 2025-05-14 DIAGNOSIS — N30.00 ACUTE CYSTITIS WITHOUT HEMATURIA: ICD-10-CM

## 2025-05-14 LAB
ALBUMIN SERPL ELPH-MCNC: 3.5 G/DL — SIGNIFICANT CHANGE UP (ref 3.3–5)
ALP SERPL-CCNC: 42 U/L — SIGNIFICANT CHANGE UP (ref 40–120)
ALT FLD-CCNC: 7 U/L — LOW (ref 10–45)
ANION GAP SERPL CALC-SCNC: 13 MMOL/L — SIGNIFICANT CHANGE UP (ref 5–17)
ANION GAP SERPL CALC-SCNC: 14 MMOL/L — SIGNIFICANT CHANGE UP (ref 5–17)
AST SERPL-CCNC: 14 U/L — SIGNIFICANT CHANGE UP (ref 10–40)
BASOPHILS # BLD AUTO: 0.01 K/UL — SIGNIFICANT CHANGE UP (ref 0–0.2)
BASOPHILS NFR BLD AUTO: 0.2 % — SIGNIFICANT CHANGE UP (ref 0–2)
BILIRUB SERPL-MCNC: 0.2 MG/DL — SIGNIFICANT CHANGE UP (ref 0.2–1.2)
BLD GP AB SCN SERPL QL: NEGATIVE — SIGNIFICANT CHANGE UP
BUN SERPL-MCNC: 10 MG/DL — SIGNIFICANT CHANGE UP (ref 7–23)
BUN SERPL-MCNC: 11 MG/DL — SIGNIFICANT CHANGE UP (ref 7–23)
CALCIUM SERPL-MCNC: 9.1 MG/DL — SIGNIFICANT CHANGE UP (ref 8.4–10.5)
CALCIUM SERPL-MCNC: 9.2 MG/DL — SIGNIFICANT CHANGE UP (ref 8.4–10.5)
CHLORIDE SERPL-SCNC: 106 MMOL/L — SIGNIFICANT CHANGE UP (ref 96–108)
CHLORIDE SERPL-SCNC: 106 MMOL/L — SIGNIFICANT CHANGE UP (ref 96–108)
CO2 SERPL-SCNC: 19 MMOL/L — LOW (ref 22–31)
CO2 SERPL-SCNC: 20 MMOL/L — LOW (ref 22–31)
CREAT SERPL-MCNC: 0.5 MG/DL — SIGNIFICANT CHANGE UP (ref 0.5–1.3)
CREAT SERPL-MCNC: 0.55 MG/DL — SIGNIFICANT CHANGE UP (ref 0.5–1.3)
EGFR: 89 ML/MIN/1.73M2 — SIGNIFICANT CHANGE UP
EGFR: 89 ML/MIN/1.73M2 — SIGNIFICANT CHANGE UP
EGFR: 91 ML/MIN/1.73M2 — SIGNIFICANT CHANGE UP
EGFR: 91 ML/MIN/1.73M2 — SIGNIFICANT CHANGE UP
EOSINOPHIL # BLD AUTO: 0.04 K/UL — SIGNIFICANT CHANGE UP (ref 0–0.5)
EOSINOPHIL NFR BLD AUTO: 0.6 % — SIGNIFICANT CHANGE UP (ref 0–6)
GLUCOSE SERPL-MCNC: 85 MG/DL — SIGNIFICANT CHANGE UP (ref 70–99)
GLUCOSE SERPL-MCNC: 87 MG/DL — SIGNIFICANT CHANGE UP (ref 70–99)
HCT VFR BLD CALC: 31.2 % — LOW (ref 34.5–45)
HCT VFR BLD CALC: 31.6 % — LOW (ref 34.5–45)
HGB BLD-MCNC: 9.4 G/DL — LOW (ref 11.5–15.5)
HGB BLD-MCNC: 9.5 G/DL — LOW (ref 11.5–15.5)
IMM GRANULOCYTES NFR BLD AUTO: 0.3 % — SIGNIFICANT CHANGE UP (ref 0–0.9)
LYMPHOCYTES # BLD AUTO: 1.03 K/UL — SIGNIFICANT CHANGE UP (ref 1–3.3)
LYMPHOCYTES # BLD AUTO: 16 % — SIGNIFICANT CHANGE UP (ref 13–44)
MAGNESIUM SERPL-MCNC: 1.9 MG/DL — SIGNIFICANT CHANGE UP (ref 1.6–2.6)
MAGNESIUM SERPL-MCNC: 2.1 MG/DL — SIGNIFICANT CHANGE UP (ref 1.6–2.6)
MCHC RBC-ENTMCNC: 24.5 PG — LOW (ref 27–34)
MCHC RBC-ENTMCNC: 24.7 PG — LOW (ref 27–34)
MCHC RBC-ENTMCNC: 30.1 G/DL — LOW (ref 32–36)
MCHC RBC-ENTMCNC: 30.1 G/DL — LOW (ref 32–36)
MCV RBC AUTO: 81.3 FL — SIGNIFICANT CHANGE UP (ref 80–100)
MCV RBC AUTO: 82.1 FL — SIGNIFICANT CHANGE UP (ref 80–100)
MONOCYTES # BLD AUTO: 1.08 K/UL — HIGH (ref 0–0.9)
MONOCYTES NFR BLD AUTO: 16.8 % — HIGH (ref 2–14)
NEUTROPHILS # BLD AUTO: 4.24 K/UL — SIGNIFICANT CHANGE UP (ref 1.8–7.4)
NEUTROPHILS NFR BLD AUTO: 66.1 % — SIGNIFICANT CHANGE UP (ref 43–77)
NRBC BLD AUTO-RTO: 0 /100 WBCS — SIGNIFICANT CHANGE UP (ref 0–0)
NRBC BLD AUTO-RTO: 0 /100 WBCS — SIGNIFICANT CHANGE UP (ref 0–0)
PHOSPHATE SERPL-MCNC: 2.7 MG/DL — SIGNIFICANT CHANGE UP (ref 2.5–4.5)
PHOSPHATE SERPL-MCNC: 2.9 MG/DL — SIGNIFICANT CHANGE UP (ref 2.5–4.5)
PLATELET # BLD AUTO: 215 K/UL — SIGNIFICANT CHANGE UP (ref 150–400)
PLATELET # BLD AUTO: 221 K/UL — SIGNIFICANT CHANGE UP (ref 150–400)
POTASSIUM SERPL-MCNC: 3.6 MMOL/L — SIGNIFICANT CHANGE UP (ref 3.5–5.3)
POTASSIUM SERPL-MCNC: 3.6 MMOL/L — SIGNIFICANT CHANGE UP (ref 3.5–5.3)
POTASSIUM SERPL-SCNC: 3.6 MMOL/L — SIGNIFICANT CHANGE UP (ref 3.5–5.3)
POTASSIUM SERPL-SCNC: 3.6 MMOL/L — SIGNIFICANT CHANGE UP (ref 3.5–5.3)
PROT SERPL-MCNC: 6.3 G/DL — SIGNIFICANT CHANGE UP (ref 6–8.3)
RBC # BLD: 3.84 M/UL — SIGNIFICANT CHANGE UP (ref 3.8–5.2)
RBC # BLD: 3.85 M/UL — SIGNIFICANT CHANGE UP (ref 3.8–5.2)
RBC # FLD: 14.3 % — SIGNIFICANT CHANGE UP (ref 10.3–14.5)
RBC # FLD: 14.5 % — SIGNIFICANT CHANGE UP (ref 10.3–14.5)
RH IG SCN BLD-IMP: POSITIVE — SIGNIFICANT CHANGE UP
SODIUM SERPL-SCNC: 139 MMOL/L — SIGNIFICANT CHANGE UP (ref 135–145)
SODIUM SERPL-SCNC: 139 MMOL/L — SIGNIFICANT CHANGE UP (ref 135–145)
WBC # BLD: 6.11 K/UL — SIGNIFICANT CHANGE UP (ref 3.8–10.5)
WBC # BLD: 6.42 K/UL — SIGNIFICANT CHANGE UP (ref 3.8–10.5)
WBC # FLD AUTO: 6.11 K/UL — SIGNIFICANT CHANGE UP (ref 3.8–10.5)
WBC # FLD AUTO: 6.42 K/UL — SIGNIFICANT CHANGE UP (ref 3.8–10.5)

## 2025-05-14 PROCEDURE — 99233 SBSQ HOSP IP/OBS HIGH 50: CPT | Mod: GC

## 2025-05-14 RX ORDER — ACETAMINOPHEN 500 MG/5ML
650 LIQUID (ML) ORAL ONCE
Refills: 0 | Status: COMPLETED | OUTPATIENT
Start: 2025-05-14 | End: 2025-05-14

## 2025-05-14 RX ORDER — CEFPODOXIME PROXETIL 200 MG/1
200 TABLET, FILM COATED ORAL EVERY 12 HOURS
Refills: 0 | Status: DISCONTINUED | OUTPATIENT
Start: 2025-05-14 | End: 2025-05-14

## 2025-05-14 RX ORDER — B1/B2/B3/B5/B6/B12/VIT C/FOLIC 500-0.5 MG
1 TABLET ORAL EVERY 24 HOURS
Refills: 0 | Status: DISCONTINUED | OUTPATIENT
Start: 2025-05-14 | End: 2025-05-15

## 2025-05-14 RX ADMIN — Medication 81 MILLIGRAM(S): at 11:45

## 2025-05-14 RX ADMIN — ROSUVASTATIN CALCIUM 20 MILLIGRAM(S): 20 TABLET, FILM COATED ORAL at 21:51

## 2025-05-14 RX ADMIN — Medication 650 MILLIGRAM(S): at 22:00

## 2025-05-14 RX ADMIN — Medication 40 MILLIEQUIVALENT(S): at 11:45

## 2025-05-14 RX ADMIN — LISINOPRIL 5 MILLIGRAM(S): 5 TABLET ORAL at 07:35

## 2025-05-14 RX ADMIN — Medication 20 MILLIGRAM(S): at 11:45

## 2025-05-14 RX ADMIN — Medication 100 MILLIGRAM(S): at 18:24

## 2025-05-14 RX ADMIN — AMITRIPTYLINE HYDROCHLORIDE 25 MILLIGRAM(S): 25 TABLET, FILM COATED ORAL at 11:45

## 2025-05-14 RX ADMIN — Medication 88 MICROGRAM(S): at 07:35

## 2025-05-14 RX ADMIN — Medication 650 MILLIGRAM(S): at 21:51

## 2025-05-14 RX ADMIN — Medication 1 TABLET(S): at 18:24

## 2025-05-14 RX ADMIN — ENOXAPARIN SODIUM 30 MILLIGRAM(S): 100 INJECTION SUBCUTANEOUS at 11:45

## 2025-05-14 RX ADMIN — Medication 650 MILLIGRAM(S): at 08:17

## 2025-05-14 RX ADMIN — Medication 650 MILLIGRAM(S): at 09:17

## 2025-05-14 RX ADMIN — CEFPODOXIME PROXETIL 200 MILLIGRAM(S): 200 TABLET, FILM COATED ORAL at 09:11

## 2025-05-14 NOTE — DIETITIAN INITIAL EVALUATION ADULT - PERTINENT MEDS FT
MEDICATIONS  (STANDING):  aspirin  chewable 81 milliGRAM(s) Oral daily  enoxaparin Injectable 30 milliGRAM(s) SubCutaneous every 24 hours  famotidine    Tablet 20 milliGRAM(s) Oral daily  levothyroxine 88 MICROGram(s) Oral every 24 hours  lisinopril 5 milliGRAM(s) Oral every 24 hours  polyethylene glycol 3350 17 Gram(s) Oral every 12 hours  rosuvastatin 20 milliGRAM(s) Oral at bedtime    MEDICATIONS  (PRN):  bisacodyl Suppository 10 milliGRAM(s) Rectal daily PRN Constipation

## 2025-05-14 NOTE — PROGRESS NOTE ADULT - PROBLEM SELECTOR PLAN 4
CT A/P shows Mild L1 compression fracture which patient reports is known. Currently without back pain; denies recent falls or worsening gait instability, LE paresthesias    -ortho/spine consult to determine need for TSLO brace   - PT eval History of CVA. ncCTH shows Mild periventricular white matter ischemia.  Old infarction in the RIGHT cerebellum. Reported history of LLE weakness however currently without any focal deficits     - continue to monitor

## 2025-05-14 NOTE — PROGRESS NOTE ADULT - PROBLEM SELECTOR PLAN 9
- cw famotidine 20mg qd Hb 9.8 (baseline 8.4-9.8) MCV 82.9 no active s/s bleeding. Denies hemoptysis. Iron panel 8/2023 iron 17 TIBC 272 %sat 6 ferritin 14    - trend CBC  - f/u iron panel, ferritin  - maintain 2 large bore IVs, active T&S

## 2025-05-14 NOTE — PROGRESS NOTE ADULT - PROBLEM SELECTOR PLAN 1
Reports intermittent LLQ pain for past month and 1x vomiting yesterday, Has noted constipation recently for which she tried metamucil. Exam ND, + mild epigastric and LLQ TTP, +BS  CT A/P with IV contrast shows diverticulosis, stool in rectal vault.   LLQ pain likely 2/2 constipation vs diverticulosis  - give dulcolax suppository and start on miralax BID   - pt currently denies nausea, trial diet   - monitor BMs  - She reports chronic pain for which she takes Amitriptyline 25mg (QTc 437) Cx growing e.coli however UA not consistent only 3 WBC. Pt reports suprapubic tenderness with urinary retnetion. BS >500cc. SCx1    -started cefpodxime 200mg BID x 3 days   -monitor improvement  -garry BS q6h

## 2025-05-14 NOTE — PROGRESS NOTE ADULT - PROBLEM/PLAN-2
DISPLAY PLAN FREE TEXT
DISPLAY PLAN FREE TEXT

## 2025-05-14 NOTE — PROGRESS NOTE ADULT - PROBLEM SELECTOR PLAN 2
Reports history of recurrent UTI takes ellura (cranberry extract) outpatient. Reports 1x episode of mild dysuria. UA spec grav 1.063, protein 30, nitrite positive, small blood bacteria numerous WBC 3  Pt currently denies dysuria, suprapubic tenderness. Meets 0/4 SIRS criteria    - for now will monitor off antibiotics   - f/u Ucx Reports intermittent LLQ pain for past month and 1x vomiting yesterday, Has noted constipation recently for which she tried metamucil. Exam ND, + mild epigastric and LLQ TTP, +BS  CT A/P with IV contrast shows diverticulosis, stool in rectal vault.   LLQ pain likely 2/2 constipation vs diverticulosis  - give dulcolax suppository and start on miralax BID   - pt currently denies nausea, trial diet   - monitor BMs  - She reports chronic pain for which she takes Amitriptyline 25mg (QTc 437)

## 2025-05-14 NOTE — DIETITIAN INITIAL EVALUATION ADULT - ORAL INTAKE PTA/DIET HISTORY
No known food allergies nor food intolerances reported. Pt reported good appetite and PO intake at baseline, mentioned eats at least 2 meals/day and snacks when she feels like it. Pt denied following any specific diet PTA and stated her son cooks all of her meals.

## 2025-05-14 NOTE — DIETITIAN INITIAL EVALUATION ADULT - NSFNSGIIOFT_GEN_A_CORE
Pt reported persistent constipation and stated last BM this AM 5/15 secondary to suppository and miralax use yesterday 5/14

## 2025-05-14 NOTE — PROGRESS NOTE ADULT - PROBLEM SELECTOR PLAN 11
F: PO  E: Replete PRN  N: regular with ensure  Lines: pIV  DVT pphx: lovenox 40    CODE STATUS: full  DISPO: [facility] Albumin 3.2    - nutrition consult

## 2025-05-14 NOTE — PROGRESS NOTE ADULT - ASSESSMENT
I M    84F PMH CVA (residual gait disturbance and LLE weakness, ambulates with walker), HTN, HLD, hypothyroidism, chronic UTI p/w L sided hip pain and non bloody emesis episode x1 admitted for abdominal pain diagnosis and management      Problem/Plan - 1:  ·  Problem: Left lower quadrant pain.   ·  Plan: Reports intermittent LLQ pain for past month and 1x vomiting yesterday, Has noted constipation recently for which she tried metamucil. Exam ND, + mild epigastric and LLQ TTP, +BS  CT A/P with IV contrast shows diverticulosis, stool in rectal vault.   LLQ pain likely 2/2 constipation vs diverticulosis  - give dulcolax suppository and start on miralax BID   - pt currently denies nausea, trial diet   - monitor BMs  - She reports chronic pain for which she takes Amitriptyline 25mg (QTc 437).    Problem/Plan - 2:  ·  Problem: Asymptomatic bacteriuria.   ·  Plan: Reports history of recurrent UTI takes ellura (cranberry extract) outpatient. Reports 1x episode of mild dysuria. UA spec grav 1.063, protein 30, nitrite positive, small blood bacteria numerous WBC 3  Pt currently denies dysuria, suprapubic tenderness. Meets 0/4 SIRS criteria    - for now will monitor off antibiotics   - f/u Ucx.    Problem/Plan - 3:  ·  Problem: CVA (cerebrovascular accident).   ·  Plan: History of CVA. ncCTH shows Mild periventricular white matter ischemia.  Old infarction in the RIGHT cerebellum. Reported history of LLE weakness however currently without any focal deficits     - continue to monitor.    Problem/Plan - 4:  ·  Problem: Lumbar compression fracture.   ·  Plan: CT A/P shows Mild L1 compression fracture which patient reports is known. Currently without back pain; denies recent falls or worsening gait instability, LE paresthesias    -ortho/spine consult to determine need for TSLO brace   - PT eval.    Problem/Plan - 5:  ·  Problem: Hypothyroidism.   ·  Plan: - cw home synthroid 88 mcg  - f/u TSH/fT4.    Problem/Plan - 6:  ·  Problem: Hypertension.   ·  Plan: - cw home lisinopril 5mg qd.    Problem/Plan - 7:  ·  Problem: Hyperlipidemia.   ·  Plan: - cw home rosuvastatin 20 qd.    Problem/Plan - 8:  ·  Problem: Normocytic anemia.   ·  Plan: Hb 9.8 (baseline 8.4-9.8) MCV 82.9 no active s/s bleeding. Denies hemoptysis. Iron panel 8/2023 iron 17 TIBC 272 %sat 6 ferritin 14    - trend CBC  - f/u iron panel, ferritin  - maintain 2 large bore IVs, active T&S.    Problem/Plan - 9:  ·  Problem: GERD (gastroesophageal reflux disease).   ·  Plan: - cw famotidine 20mg qd.    Problem/Plan - 10:  ·  Problem: Hypoalbuminemia.   ·  Plan; Albumin 3.2    - nutrition consult.    Problem/Plan - 11:  ·  Problem: Need for prophylactic measure.   ·  Plan: F: PO  E: Replete PRN  N: regular with ensure  Lines: pIV  DVT pphx: lovenox 40    CODE STATUS: full  DISPO: [facility].    
84F PMH CVA (residual gait disturbance and LLE weakness, ambulates with walker), HTN, HLD, hypothyroidism, chronic UTI p/w L sided hip pain and non bloody emesis episode x1 admitted for abdominal pain diagnosis and management
84F PMH CVA (residual gait disturbance and LLE weakness, ambulates with walker), HTN, HLD, hypothyroidism, chronic UTI p/w L sided hip pain and non bloody emesis episode x1 admitted for abdominal pain diagnosis and management

## 2025-05-14 NOTE — DIETITIAN INITIAL EVALUATION ADULT - OTHER CALCULATIONS
*Using current body weight as it is within % ideal body weight. Needs adjusted for malnutrition, advanced age. ideal body weight: 100 pounds; % ideal body weight: 100%

## 2025-05-14 NOTE — PROGRESS NOTE ADULT - ATTENDING COMMENTS
Pt. seen and examined by me earlier today; I have read Dr. Pelayo's note, I agree w/ her findings and plan of care as documented; Pt. had an episode of acute urinary retention this AM requiring straight cath x1; Pt. not constipated, reports +BM; CT A/P otherwise unremarkable; UA < 5 WBCs and (-) LE, UTI unlikely; suspect etiology may be anticholinergic effect from amitriptyline; housestaff spoke w/ Pt.'s PCP, Dr. Kevin Garcia, who is in agreement w/ d/c'ing amitriptyline (which was reportedly prescribed a few years ago by previous PCP for abdominal pain); will monitor serial bladder scans; Pt. is otherwise medically clear for d/c home w/ home PT, pending resolution of urinary retention; alternatively, may need to go home w/ a Flores (which she has done before); Pt. has extensive urologic hx, outpatient f/u w/ a uro-gynecologist at [facility]

## 2025-05-14 NOTE — PROGRESS NOTE ADULT - TIME BILLING
coordination of care; preparing to see Pt.; interviewing Pt.; examining Pt.; reviewing labs and images; documentation in [place]; d/w Medicine resident on rounds

## 2025-05-14 NOTE — DIETITIAN INITIAL EVALUATION ADULT - OTHER INFO
Per H&P: 84F PMH CVA (residual gait disturbance and LLE weakness, ambulates with walker), HTN, HLD, hypothyroidism, chronic UTI p/w LLQ abdominal pain and non bloody emesis episode x1. She reports she has had intermittent LLQ pain which her PCP told her could be related to osteoporosis. Reports she has experienced constipation tried taking metamucil which did not help. Reports some dysuria when she went to restroom yesterday otherwise she takes ellura (soluble proanthocyanidins- cranberry extract) for her history of UTI.  She reports she had one episode of non bloody emesis but not feels fine and wants to eat. Denies history of dysphagia or coughing after she eats. Her PCP told her she has a lumbar compression fracture and has never had a brace. She lives with her son Chepe Triplett ([phone]) and ambulates with a walker. Denies fever, chills, cough, n/v, chest pain, diarrhea    Met with pt this AM at bedside. Pt was seated upright and able to articulate her nutrition hx well. No known food allergies nor food intolerances reported. Pt reported good appetite and PO intake at baseline, mentioned eats at least 2 meals/day and snacks when she feels like it. Pt denied following any specific diet PTA and stated her son cooks all of her meals. Pt reported current appetite is lower than usual as she dislikes the food/has difficulty opening container. RD assisted with opening peach container for patient and observed pt eating breakfast. Pt denied chewing/swallowing difficulty. Pt reported persistent constipation and stated last BM this AM 5/15 secondary to suppository and miralax use yesterday 5/14. Pt reported height 5'0" and usual body weight 100 pounds and has been stable ~3 years (previous usual body weight 120 pounds ~3 years ago). RD reviewed constipation nutrition therapy with patient, including importance of adequate fiber intake (specifically fruits and vegetables) and fluid intake to assist with BMs. Pt was accepting to RD education and asking appropriate questions.

## 2025-05-14 NOTE — PROGRESS NOTE ADULT - PROBLEM SELECTOR PLAN 8
Hb 9.8 (baseline 8.4-9.8) MCV 82.9 no active s/s bleeding. Denies hemoptysis. Iron panel 8/2023 iron 17 TIBC 272 %sat 6 ferritin 14    - trend CBC  - f/u iron panel, ferritin  - maintain 2 large bore IVs, active T&S - cw home rosuvastatin 20 qd

## 2025-05-14 NOTE — PROGRESS NOTE ADULT - PROBLEM SELECTOR PLAN 3
History of CVA. ncCTH shows Mild periventricular white matter ischemia.  Old infarction in the RIGHT cerebellum. Reported history of LLE weakness however currently without any focal deficits     - continue to monitor Reports history of recurrent UTI takes ellura (cranberry extract) outpatient. Reports 1x episode of mild dysuria. UA spec grav 1.063, protein 30, nitrite positive, small blood bacteria numerous WBC 3  Pt currently denies dysuria, suprapubic tenderness. Meets 0/4 SIRS criteria    - for now will monitor off antibiotics   - f/u Ucx

## 2025-05-14 NOTE — PROGRESS NOTE ADULT - SUBJECTIVE AND OBJECTIVE BOX
Physical Medicine and Rehabilitation Progress Note :       Patient is a 84y old  Female who presents with a chief complaint of LUQ pain  (13 May 2025 14:10)      HPI:  84F PMH CVA (residual gait disturbance and LLE weakness, ambulates with walker), HTN, HLD, hypothyroidism, chronic UTI p/w LLQ abdominal pain and non bloody emesis episode x1. She reports she has had intermittent LLQ pain which her PCP told her could be related to osteoporosis. Reports she has experienced constipation tried taking metamucil which did not help. Reports some dysuria when she went to restroom yesterday otherwise she takes ellura (soluble proanthocyanidins- cranberry extract) for her history of UTI.  She reports she had one episode of non bloody emesis but not feels fine and wants to eat. Denies history of dysphagia or coughing after she eats. Her PCP told her she has a lumbar compression fracture and has never had a brace. She lives with her son Chepe Triplett ([phone]) and ambulates with a walker. Denies fever, chills, cough, n/v, chest pain, diarrhea    ED   Vitals afebrile, HR 67, 123/68, RR17, 96% on 2L NC  Labs Hb 9.8 MCV 82.9, albumin 3.2, UA spec grav 1.063, protein 30, nitrite positive, small blood bacteria numerous WBC 3  Imaging CXR no acute infiltrates CT A/P with IV contrast bibasilar atelectasis, mild L1 compression fx, extensive sigmoid diverticulosis without evidence of diverticulitis, ncCTH shows Mild periventricular white matter ischemia.  Old infarction in the R cerebellum. Mild to moderate frontal and parietal lobe atrophy.  Inteventions CTX 1g, NS 500cc   (13 May 2025 05:58)                            9.5    6.11  )-----------( 215      ( 14 May 2025 08:00 )             31.6       05-14    139  |  106  |  10  ----------------------------<  85  3.6   |  19[L]  |  0.50    Ca    9.2      14 May 2025 08:00  Phos  2.9     05-14  Mg     1.9     05-14    TPro  6.3  /  Alb  3.5  /  TBili  0.2  /  DBili  x   /  AST  14  /  ALT  7[L]  /  AlkPhos  42  05-13    Vital Signs Last 24 Hrs  T(C): 36.6 (14 May 2025 09:21), Max: 36.8 (13 May 2025 15:30)  T(F): 97.8 (14 May 2025 09:21), Max: 98.2 (13 May 2025 15:30)  HR: 79 (14 May 2025 09:21) (72 - 84)  BP: 135/75 (14 May 2025 09:21) (96/60 - 150/90)  BP(mean): 82 (13 May 2025 12:05) (82 - 82)  RR: 18 (14 May 2025 09:21) (16 - 19)  SpO2: 92% (14 May 2025 09:21) (90% - 96%)    Parameters below as of 14 May 2025 09:21  Patient On (Oxygen Delivery Method): room air        MEDICATIONS  (STANDING):  amitriptyline 25 milliGRAM(s) Oral every 24 hours  aspirin  chewable 81 milliGRAM(s) Oral daily  enoxaparin Injectable 30 milliGRAM(s) SubCutaneous every 24 hours  famotidine    Tablet 20 milliGRAM(s) Oral daily  levothyroxine 88 MICROGram(s) Oral every 24 hours  lisinopril 5 milliGRAM(s) Oral every 24 hours  polyethylene glycol 3350 17 Gram(s) Oral every 12 hours  potassium chloride   Powder 40 milliEquivalent(s) Oral once  rosuvastatin 20 milliGRAM(s) Oral at bedtime    MEDICATIONS  (PRN):  bisacodyl Suppository 10 milliGRAM(s) Rectal daily PRN Constipation      T(C): 36.6 (05-14-25 @ 09:21), Max: 36.8 (05-13-25 @ 15:30)  HR: 79 (05-14-25 @ 09:21) (72 - 84)  BP: 135/75 (05-14-25 @ 09:21) (96/60 - 150/90)  RR: 18 (05-14-25 @ 09:21) (16 - 19)  SpO2: 92% (05-14-25 @ 09:21) (90% - 96%)    Physical Exam:  84 y o woman lying comfortably in semi Quinn's position , awake , alert , no acute complaints     Head: normocephalic , atraumatic    Eyes: PERRLA , EOMI , no nystagmus , sclera anicteric    ENT / FACE: neg nasal discharge , uvula midline , no oropharyngeal erythema / exudate    Neck: supple , negative JVD , negative carotid bruits , no thyromegaly    Chest: CTA bilaterally     Cardiovascular: regular rate and rhythm , neg murmurs / rubs / gallops    Abdomen: soft , non distended , LLQ tenderness to palpation , neg r/g ,  normal bowel sounds     Extremities: WWP , neg cyanosis /clubbing / edema     Neurologic Exam:     Alert and oriented to person , place , date/year     Cranial Nerves:           II:                         pupils equal , round and reactive to light , visual fields intact         III/ IV/VI:             extraocular movements intact , neg nystagmus , neg ptosis        V:                        facial sensation intact , V1-3 normal        VII:                      face symmetric , no droop , normal eye closure and smile        VIII:                     hearing intact to finger rub bilaterally        IX and X:             no hoarseness , gag intact , palate/ uvula rise symmetrically        XI:                       SCM / trapezius strength intact bilateral        XII:                      no tongue deviation    Motor Exam:        > 3+/5 x 4 extremities , without drift     Sensation:         intact to light touch x 4 extremities                            no neglect or extinction on double simultaneous testing    DTR:           biceps/brachioradialis: equal                            patella/ankle: equal          neg Babinski      Coordination:            Finger to Nose:  neg dysmetria bilaterally       Initial Functional Status Assessment :       Previous Level of Function:     · Ambulation Skills  needs device and assist  · Transfer Skills  needs device and assist  · ADL Skills  needs device and assist  · Work/Leisure Activity  needs device and assist  · Additional Comments  Pt lives in a co-op with her son, no stairs to enter. Pt reports her son works during the day and she has an aide at 1pm-5pm x 5 days per week. Pt ambulates with rollator at baseline. Pt reports she was receiving physical therapy at home prior to admission and would like to continue.    Cognitive Status Examination:   · Orientation  oriented to person, place, time and situation  · Level of Consciousness  alert  · Follows Commands and Answers Questions  100% of the time  · Personal Safety and Judgment  intact  · Short Term Memory  intact  · Long Term Memory  intact    Range of Motion Exam:   · Range of Motion Examination  bilateral upper extremity ROM was WFL (within functional limits); bilateral lower extremity ROM was WFL (within functional limits)    Manual Muscle Testing:   · Manual Muscle Testing Results  BUE >3+/5 MMT, pt reports decreased RUE  strength, BLE grossly LLE 4/5 MMT RLE 5/5 MMT    Bed Mobility: Rolling/Turning:     · Level of [place]  minimum assist (75% patients effort)  · Physical Assist/Nonphysical Assist  1 person assist    Bed Mobility: Supine to Sit:     · Level of [place]  minimum assist (75% patients effort)  · Physical Assist/Nonphysical Assist  1 person assist    Bed Mobility Analysis:     · Impairments Contributing to Impaired Bed Mobility  impaired balance; decreased strength    Transfer: Sit to Stand:     · Level of [place]  minimum assist (75% patients effort)  · Physical Assist/Nonphysical Assist  1 person assist  · Weight-Bearing Restrictions  weight-bearing as tolerated    Transfer: Stand to Sit:     · Level of [place]  minimum assist (75% patients effort)  · Physical Assist/Nonphysical Assist  1 person assist  · Weight-Bearing Restrictions  weight-bearing as tolerated    Sit/Stand Transfer Safety Analysis:     · Impairments Contributing to Impaired Transfers  impaired balance; decreased strength    Gait Skills:     · Level of [place]  minimum assist (75% patients effort)  · Physical Assist/Nonphysical Assist  1 person assist  · Weight-Bearing Restrictions  weight-bearing as tolerated  · Gait Distance  3 steps bedside, pt deferred further ambulation at this time despite maximal encouragement    Gait Analysis:     · Gait Pattern Used  2-point gait  · Impairments Contributing to Gait Deviations  impaired balance; decreased strength    Balance Skills Assessment:     · Sitting Balance: Static  fair balance  · Sitting Balance: Dynamic  fair balance  · Sit-to-Stand Balance  fair minus  · Standing Balance: Static  fair minus  · Standing Balance: Dynamic  fair minus  · Systems Impairment Contributing to Balance Disturbance  musculoskeletal  · Identified Impairments Contributing to Balance Disturbance  decreased strength    Sensory Examination:   Sensory Examination:    Light Touch Sensation:   · Left UE  within normal limits  · Right UE  within normal limits  · Left LE  within normal limits  · Right LE  within normal limits      Clinical Impressions:   · Criteria for Skilled Therapeutic Interventions  impairments found; predicted duration of therapy intervention; functional limitations in following categories; anticipated equipment needs at discharge; risk reduction/prevention; anticipated discharge recommendation; rehab potential; therapy frequency  · Impairments Found (describe specific impairments)  aerobic capacity/endurance; ergonomics and body mechanics; fine motor; gait, locomotion, and balance; gross motor; posture; muscle strength; joint integrity and mobility; integumentary integrity; ROM  · Functional Limitations in Following Categories (describe specific limitations)  self-care; home management; community/leisure  · Risk Reduction/Prevention (Describe Specific Areas of risk reduction/prevention)  risk factors  · Risk Areas  fall        PM&R Impression : as above    Current disposition plan recommendation :    d/c home with home physical therapy

## 2025-05-14 NOTE — PROGRESS NOTE ADULT - PROBLEM SELECTOR PLAN 5
- cw home synthroid 88 mcg  - f/u TSH/fT4 CT A/P shows Mild L1 compression fracture which patient reports is known. Currently without back pain; denies recent falls or worsening gait instability, LE paresthesias    -ortho/spine consult to determine need for TSLO brace   - PT eval

## 2025-05-14 NOTE — PROGRESS NOTE ADULT - SUBJECTIVE AND OBJECTIVE BOX
INTERVAL HPI/OVERNIGHT EVENTS:  Patient was seen and examined at bedside. As per nurse and patient, no o/n events, patient resting comfortably. No complaints at this time. Patient denies: fever, chills, dizziness, weakness, HA, Changes in vision, CP, palpitations, SOB, cough, N/V/D/C, dysuria, changes in bowel movements, LE edema. ROS otherwise negative.    VITAL SIGNS:  T(F): 97.8 (05-14-25 @ 09:21)  HR: 79 (05-14-25 @ 09:21)  BP: 135/75 (05-14-25 @ 09:21)  RR: 18 (05-14-25 @ 09:21)  SpO2: 92% (05-14-25 @ 09:21)  Wt(kg): --      05-13-25 @ 07:01  -  05-14-25 @ 07:00  --------------------------------------------------------  IN: 0 mL / OUT: 300 mL / NET: -300 mL    05-14-25 @ 07:01  -  05-14-25 @ 12:26  --------------------------------------------------------  IN: 0 mL / OUT: 700 mL / NET: -700 mL        PHYSICAL EXAM:    Constitutional: resting comfortably in bed; NAD  HEENT: NC/AT, PERRL, EOMI, anicteric sclera, no nasal discharge; uvula midline, no oropharyngeal erythema or exudates; MMM  Neck: supple; no JVD or thyromegaly  Respiratory: CTA B/L; no W/R/R, no retractions  Cardiac: +S1/S2; RRR; no M/R/G; PMI non-displaced  Gastrointestinal: soft, NT/ND; no rebound or guarding; +BSx4  Genitourinary: normal external genitalia  Back: spine midline, no bony tenderness or step-offs; no CVAT B/L  Extremities: WWP, no clubbing or cyanosis; no peripheral edema  Musculoskeletal: NROM x4; no joint swelling, tenderness or erythema  Vascular: 2+ radial, DP/PT pulses B/L  Dermatologic: skin warm, dry and intact; no rashes, wounds, or scars  Lymphatic: no submandibular or cervical LAD  Neurologic: AAOx3; CNII-XII grossly intact; no focal deficits  Psychiatric: affect and characteristics of appearance, verbalizations, behaviors are appropriate, denies SI/HI/AH/VH    MEDICATIONS  (STANDING):  amitriptyline 25 milliGRAM(s) Oral every 24 hours  aspirin  chewable 81 milliGRAM(s) Oral daily  enoxaparin Injectable 30 milliGRAM(s) SubCutaneous every 24 hours  famotidine    Tablet 20 milliGRAM(s) Oral daily  levothyroxine 88 MICROGram(s) Oral every 24 hours  lisinopril 5 milliGRAM(s) Oral every 24 hours  polyethylene glycol 3350 17 Gram(s) Oral every 12 hours  rosuvastatin 20 milliGRAM(s) Oral at bedtime    MEDICATIONS  (PRN):  bisacodyl Suppository 10 milliGRAM(s) Rectal daily PRN Constipation      Allergies    Bactrim (Unknown)  oxycodone (Unknown)    Intolerances        LABS:                        9.5    6.11  )-----------( 215      ( 14 May 2025 08:00 )             31.6     05-14    139  |  106  |  10  ----------------------------<  85  3.6   |  19[L]  |  0.50    Ca    9.2      14 May 2025 08:00  Phos  2.9     05-14  Mg     1.9     05-14    TPro  6.3  /  Alb  3.5  /  TBili  0.2  /  DBili  x   /  AST  14  /  ALT  7[L]  /  AlkPhos  42  05-13      Urinalysis Basic - ( 14 May 2025 08:00 )    Color: x / Appearance: x / SG: x / pH: x  Gluc: 85 mg/dL / Ketone: x  / Bili: x / Urobili: x   Blood: x / Protein: x / Nitrite: x   Leuk Esterase: x / RBC: x / WBC x   Sq Epi: x / Non Sq Epi: x / Bacteria: x        RADIOLOGY & ADDITIONAL TESTS:  Reviewed INTERVAL HPI/OVERNIGHT EVENTS:  Patient was seen and examined at bedside. +suprapubic tenderness and pt reports not being able to urinate    VITAL SIGNS:  T(F): 97.8 (05-14-25 @ 09:21)  HR: 79 (05-14-25 @ 09:21)  BP: 135/75 (05-14-25 @ 09:21)  RR: 18 (05-14-25 @ 09:21)  SpO2: 92% (05-14-25 @ 09:21)  Wt(kg): --      05-13-25 @ 07:01  -  05-14-25 @ 07:00  --------------------------------------------------------  IN: 0 mL / OUT: 300 mL / NET: -300 mL    05-14-25 @ 07:01  -  05-14-25 @ 12:26  --------------------------------------------------------  IN: 0 mL / OUT: 700 mL / NET: -700 mL        PHYSICAL EXAM:    General: NAD  HEENT: PERRL, EOM intact, sclera anicteric, MMM  Cardiovascular: RRR; no MRG; no JVD  Respiratory: CTAB; no WRR  GI/: soft; NTND; BS x4  Extremities: WWP; 2+ peripheral pulses bilaterally; no LE edema  Skin: normal color & turgor; no rash  Neurologic: aox3; no focal deficits      MEDICATIONS  (STANDING):  amitriptyline 25 milliGRAM(s) Oral every 24 hours  aspirin  chewable 81 milliGRAM(s) Oral daily  enoxaparin Injectable 30 milliGRAM(s) SubCutaneous every 24 hours  famotidine    Tablet 20 milliGRAM(s) Oral daily  levothyroxine 88 MICROGram(s) Oral every 24 hours  lisinopril 5 milliGRAM(s) Oral every 24 hours  polyethylene glycol 3350 17 Gram(s) Oral every 12 hours  rosuvastatin 20 milliGRAM(s) Oral at bedtime    MEDICATIONS  (PRN):  bisacodyl Suppository 10 milliGRAM(s) Rectal daily PRN Constipation      Allergies    Bactrim (Unknown)  oxycodone (Unknown)    Intolerances        LABS:                        9.5    6.11  )-----------( 215      ( 14 May 2025 08:00 )             31.6     05-14    139  |  106  |  10  ----------------------------<  85  3.6   |  19[L]  |  0.50    Ca    9.2      14 May 2025 08:00  Phos  2.9     05-14  Mg     1.9     05-14    TPro  6.3  /  Alb  3.5  /  TBili  0.2  /  DBili  x   /  AST  14  /  ALT  7[L]  /  AlkPhos  42  05-13      Urinalysis Basic - ( 14 May 2025 08:00 )    Color: x / Appearance: x / SG: x / pH: x  Gluc: 85 mg/dL / Ketone: x  / Bili: x / Urobili: x   Blood: x / Protein: x / Nitrite: x   Leuk Esterase: x / RBC: x / WBC x   Sq Epi: x / Non Sq Epi: x / Bacteria: x        RADIOLOGY & ADDITIONAL TESTS:  Reviewed

## 2025-05-14 NOTE — PROGRESS NOTE ADULT - PROBLEM SELECTOR PLAN 12
F: PO  E: Replete PRN  N: regular with ensure  Lines: pIV  DVT pphx: lovenox 40    CODE STATUS: full  DISPO: [facility]

## 2025-05-14 NOTE — DIETITIAN INITIAL EVALUATION ADULT - ADD RECOMMEND
1. Continue with soft + bite sized diet - Encourage adequate PO and protein intake  - Diet texture per team/SLP - Honor food preferences, as medically able 2. Recommend Strawberry Vanilla Protein Smoothie 1x/day   - Provides 230 kcal, 17 g pro per serving 3. Recommend MVI for general nutrient coverage 4. Recommend thiamin 100 mg/d in setting of malnutrition 5. Monitor lytes, specifically Mg and Phos for refeeding, replete prn 6. Continue with current bowel regimen, prn 7. Appreciate weekly weight trends 8. Align nutrition goals with goals of care at all times

## 2025-05-14 NOTE — DIETITIAN INITIAL EVALUATION ADULT - PERTINENT LABORATORY DATA
05-14    139  |  106  |  10  ----------------------------<  85  3.6   |  19[L]  |  0.50    Ca    9.2      14 May 2025 08:00  Phos  2.9     05-14  Mg     1.9     05-14    TPro  6.3  /  Alb  3.5  /  TBili  0.2  /  DBili  x   /  AST  14  /  ALT  7[L]  /  AlkPhos  42  05-13

## 2025-05-15 ENCOUNTER — TRANSCRIPTION ENCOUNTER (OUTPATIENT)
Age: 85
End: 2025-05-15

## 2025-05-15 VITALS
HEART RATE: 82 BPM | TEMPERATURE: 98 F | RESPIRATION RATE: 18 BRPM | SYSTOLIC BLOOD PRESSURE: 119 MMHG | DIASTOLIC BLOOD PRESSURE: 91 MMHG | OXYGEN SATURATION: 92 %

## 2025-05-15 LAB
-  AMOXICILLIN/CLAVULANIC ACID: SIGNIFICANT CHANGE UP
-  AMPICILLIN/SULBACTAM: SIGNIFICANT CHANGE UP
-  AMPICILLIN: SIGNIFICANT CHANGE UP
-  CEFAZOLIN: SIGNIFICANT CHANGE UP
-  CEFEPIME: SIGNIFICANT CHANGE UP
-  CEFTRIAXONE: SIGNIFICANT CHANGE UP
-  CEFUROXIME: SIGNIFICANT CHANGE UP
-  CIPROFLOXACIN: SIGNIFICANT CHANGE UP
-  GENTAMICIN: SIGNIFICANT CHANGE UP
-  LEVOFLOXACIN: SIGNIFICANT CHANGE UP
-  NITROFURANTOIN: SIGNIFICANT CHANGE UP
-  PIPERACILLIN/TAZOBACTAM: SIGNIFICANT CHANGE UP
-  TIGECYCLINE: SIGNIFICANT CHANGE UP
-  TOBRAMYCIN: SIGNIFICANT CHANGE UP
-  TRIMETHOPRIM/SULFAMETHOXAZOLE: SIGNIFICANT CHANGE UP
CULTURE RESULTS: ABNORMAL
METHOD TYPE: SIGNIFICANT CHANGE UP
ORGANISM # SPEC MICROSCOPIC CNT: ABNORMAL
ORGANISM # SPEC MICROSCOPIC CNT: SIGNIFICANT CHANGE UP
SPECIMEN SOURCE: SIGNIFICANT CHANGE UP

## 2025-05-15 PROCEDURE — 86850 RBC ANTIBODY SCREEN: CPT

## 2025-05-15 PROCEDURE — 84484 ASSAY OF TROPONIN QUANT: CPT

## 2025-05-15 PROCEDURE — 80053 COMPREHEN METABOLIC PANEL: CPT

## 2025-05-15 PROCEDURE — 80048 BASIC METABOLIC PNL TOTAL CA: CPT

## 2025-05-15 PROCEDURE — 70450 CT HEAD/BRAIN W/O DYE: CPT

## 2025-05-15 PROCEDURE — 87086 URINE CULTURE/COLONY COUNT: CPT

## 2025-05-15 PROCEDURE — 97116 GAIT TRAINING THERAPY: CPT

## 2025-05-15 PROCEDURE — 71045 X-RAY EXAM CHEST 1 VIEW: CPT

## 2025-05-15 PROCEDURE — 86901 BLOOD TYPING SEROLOGIC RH(D): CPT

## 2025-05-15 PROCEDURE — 72100 X-RAY EXAM L-S SPINE 2/3 VWS: CPT

## 2025-05-15 PROCEDURE — 83735 ASSAY OF MAGNESIUM: CPT

## 2025-05-15 PROCEDURE — 85027 COMPLETE CBC AUTOMATED: CPT

## 2025-05-15 PROCEDURE — 97161 PT EVAL LOW COMPLEX 20 MIN: CPT

## 2025-05-15 PROCEDURE — 86900 BLOOD TYPING SEROLOGIC ABO: CPT

## 2025-05-15 PROCEDURE — 96365 THER/PROPH/DIAG IV INF INIT: CPT

## 2025-05-15 PROCEDURE — 81001 URINALYSIS AUTO W/SCOPE: CPT

## 2025-05-15 PROCEDURE — 36415 COLL VENOUS BLD VENIPUNCTURE: CPT

## 2025-05-15 PROCEDURE — 99238 HOSP IP/OBS DSCHRG MGMT 30/<: CPT | Mod: GC

## 2025-05-15 PROCEDURE — 97535 SELF CARE MNGMENT TRAINING: CPT

## 2025-05-15 PROCEDURE — 84100 ASSAY OF PHOSPHORUS: CPT

## 2025-05-15 PROCEDURE — 74177 CT ABD & PELVIS W/CONTRAST: CPT

## 2025-05-15 PROCEDURE — 87186 SC STD MICRODIL/AGAR DIL: CPT

## 2025-05-15 PROCEDURE — 85025 COMPLETE CBC W/AUTO DIFF WBC: CPT

## 2025-05-15 PROCEDURE — 83690 ASSAY OF LIPASE: CPT

## 2025-05-15 PROCEDURE — 99285 EMERGENCY DEPT VISIT HI MDM: CPT

## 2025-05-15 RX ORDER — CEFPODOXIME PROXETIL 200 MG/1
200 TABLET, FILM COATED ORAL EVERY 12 HOURS
Refills: 0 | Status: DISCONTINUED | OUTPATIENT
Start: 2025-05-15 | End: 2025-05-15

## 2025-05-15 RX ADMIN — LISINOPRIL 5 MILLIGRAM(S): 5 TABLET ORAL at 06:18

## 2025-05-15 RX ADMIN — ENOXAPARIN SODIUM 30 MILLIGRAM(S): 100 INJECTION SUBCUTANEOUS at 11:40

## 2025-05-15 RX ADMIN — Medication 20 MILLIGRAM(S): at 11:40

## 2025-05-15 RX ADMIN — Medication 81 MILLIGRAM(S): at 11:40

## 2025-05-15 RX ADMIN — Medication 88 MICROGRAM(S): at 06:18

## 2025-05-15 NOTE — DISCHARGE NOTE NURSING/CASE MANAGEMENT/SOCIAL WORK - FINANCIAL ASSISTANCE
[facility] provides services at a reduced cost to those who are determined to be eligible through [facility]’s financial assistance program. Information regarding [facility]’s financial assistance program can be found by going to [email or web address] or by calling [phone].

## 2025-05-15 NOTE — DISCHARGE NOTE NURSING/CASE MANAGEMENT/SOCIAL WORK - PATIENT PORTAL LINK FT
You can access the FollowMyHealth Patient Portal offered by [facility] by registering at the following website: [email or web address]. By joining [employer]’s FollowMyHealth portal, you will also be able to view your health information using other applications (apps) compatible with our system.

## 2025-05-15 NOTE — DISCHARGE NOTE NURSING/CASE MANAGEMENT/SOCIAL WORK - NSDCPEFALRISK_GEN_ALL_CORE
For information on Fall & Injury Prevention, visit: [email or web address] OR  [email or web address] OR  https://www.cdc.gov/steadi/patient.html

## 2025-05-23 DIAGNOSIS — T43.015A ADVERSE EFFECT OF TRICYCLIC ANTIDEPRESSANTS, INITIAL ENCOUNTER: ICD-10-CM

## 2025-05-23 DIAGNOSIS — E44.1 MILD PROTEIN-CALORIE MALNUTRITION: ICD-10-CM

## 2025-05-23 DIAGNOSIS — K21.9 GASTRO-ESOPHAGEAL REFLUX DISEASE WITHOUT ESOPHAGITIS: ICD-10-CM

## 2025-05-23 DIAGNOSIS — Z79.82 LONG TERM (CURRENT) USE OF ASPIRIN: ICD-10-CM

## 2025-05-23 DIAGNOSIS — R33.0 DRUG INDUCED RETENTION OF URINE: ICD-10-CM

## 2025-05-23 DIAGNOSIS — K59.00 CONSTIPATION, UNSPECIFIED: ICD-10-CM

## 2025-05-23 DIAGNOSIS — E78.5 HYPERLIPIDEMIA, UNSPECIFIED: ICD-10-CM

## 2025-05-23 DIAGNOSIS — R82.71 BACTERIURIA: ICD-10-CM

## 2025-05-23 DIAGNOSIS — R10.32 LEFT LOWER QUADRANT PAIN: ICD-10-CM

## 2025-05-23 DIAGNOSIS — I69.344 MONOPLEGIA OF LOWER LIMB FOLLOWING CEREBRAL INFARCTION AFFECTING LEFT NON-DOMINANT SIDE: ICD-10-CM

## 2025-05-23 DIAGNOSIS — K57.30 DIVERTICULOSIS OF LARGE INTESTINE WITHOUT PERFORATION OR ABSCESS WITHOUT BLEEDING: ICD-10-CM

## 2025-05-23 DIAGNOSIS — M81.0 AGE-RELATED OSTEOPOROSIS WITHOUT CURRENT PATHOLOGICAL FRACTURE: ICD-10-CM

## 2025-05-23 DIAGNOSIS — I10 ESSENTIAL (PRIMARY) HYPERTENSION: ICD-10-CM

## 2025-05-23 DIAGNOSIS — Z87.440 PERSONAL HISTORY OF URINARY (TRACT) INFECTIONS: ICD-10-CM

## 2025-05-23 DIAGNOSIS — E88.09 OTHER DISORDERS OF PLASMA-PROTEIN METABOLISM, NOT ELSEWHERE CLASSIFIED: ICD-10-CM

## 2025-05-23 DIAGNOSIS — Z88.6 ALLERGY STATUS TO ANALGESIC AGENT: ICD-10-CM

## 2025-05-23 DIAGNOSIS — Z79.890 HORMONE REPLACEMENT THERAPY: ICD-10-CM

## 2025-05-23 DIAGNOSIS — Z87.891 PERSONAL HISTORY OF NICOTINE DEPENDENCE: ICD-10-CM

## 2025-05-23 DIAGNOSIS — D64.9 ANEMIA, UNSPECIFIED: ICD-10-CM

## 2025-05-23 DIAGNOSIS — Z88.1 ALLERGY STATUS TO OTHER ANTIBIOTIC AGENTS: ICD-10-CM

## 2025-05-23 DIAGNOSIS — E03.9 HYPOTHYROIDISM, UNSPECIFIED: ICD-10-CM

## 2025-08-01 ENCOUNTER — INPATIENT (INPATIENT)
Facility: HOSPITAL | Age: 85
LOS: 5 days | Discharge: NURSING, REHAB OR LONG-TERM CARE | DRG: 812 | End: 2025-08-07
Attending: STUDENT IN AN ORGANIZED HEALTH CARE EDUCATION/TRAINING PROGRAM | Admitting: STUDENT IN AN ORGANIZED HEALTH CARE EDUCATION/TRAINING PROGRAM
Payer: MEDICARE

## 2025-08-01 VITALS
HEART RATE: 66 BPM | SYSTOLIC BLOOD PRESSURE: 120 MMHG | TEMPERATURE: 98 F | WEIGHT: 95.02 LBS | RESPIRATION RATE: 17 BRPM | DIASTOLIC BLOOD PRESSURE: 57 MMHG | OXYGEN SATURATION: 100 %

## 2025-08-01 DIAGNOSIS — E03.9 HYPOTHYROIDISM, UNSPECIFIED: ICD-10-CM

## 2025-08-01 DIAGNOSIS — Z29.9 ENCOUNTER FOR PROPHYLACTIC MEASURES, UNSPECIFIED: ICD-10-CM

## 2025-08-01 DIAGNOSIS — R55 SYNCOPE AND COLLAPSE: ICD-10-CM

## 2025-08-01 DIAGNOSIS — E78.5 HYPERLIPIDEMIA, UNSPECIFIED: ICD-10-CM

## 2025-08-01 DIAGNOSIS — Z86.73 PERSONAL HISTORY OF TRANSIENT ISCHEMIC ATTACK (TIA), AND CEREBRAL INFARCTION WITHOUT RESIDUAL DEFICITS: ICD-10-CM

## 2025-08-01 DIAGNOSIS — D64.9 ANEMIA, UNSPECIFIED: ICD-10-CM

## 2025-08-01 DIAGNOSIS — S32.000A WEDGE COMPRESSION FRACTURE OF UNSPECIFIED LUMBAR VERTEBRA, INITIAL ENCOUNTER FOR CLOSED FRACTURE: ICD-10-CM

## 2025-08-01 DIAGNOSIS — I10 ESSENTIAL (PRIMARY) HYPERTENSION: ICD-10-CM

## 2025-08-01 DIAGNOSIS — Z90.710 ACQUIRED ABSENCE OF BOTH CERVIX AND UTERUS: Chronic | ICD-10-CM

## 2025-08-01 DIAGNOSIS — Z98.890 OTHER SPECIFIED POSTPROCEDURAL STATES: Chronic | ICD-10-CM

## 2025-08-01 LAB
ALBUMIN SERPL ELPH-MCNC: 3.7 G/DL — SIGNIFICANT CHANGE UP (ref 3.4–5)
ALBUMIN SERPL ELPH-MCNC: 3.9 G/DL — SIGNIFICANT CHANGE UP (ref 3.3–5)
ALP SERPL-CCNC: 34 U/L — LOW (ref 40–120)
ALP SERPL-CCNC: 40 U/L — SIGNIFICANT CHANGE UP (ref 40–120)
ALT FLD-CCNC: 14 U/L — SIGNIFICANT CHANGE UP (ref 12–42)
ALT FLD-CCNC: <5 U/L — LOW (ref 10–45)
ANION GAP SERPL CALC-SCNC: 11 MMOL/L — SIGNIFICANT CHANGE UP (ref 5–17)
ANION GAP SERPL CALC-SCNC: 9 MMOL/L — SIGNIFICANT CHANGE UP (ref 9–16)
AST SERPL-CCNC: 14 U/L — LOW (ref 15–37)
AST SERPL-CCNC: 8 U/L — LOW (ref 10–40)
BASOPHILS # BLD AUTO: 0.05 K/UL — SIGNIFICANT CHANGE UP (ref 0–0.2)
BASOPHILS NFR BLD AUTO: 1.1 % — SIGNIFICANT CHANGE UP (ref 0–2)
BILIRUB SERPL-MCNC: 0.3 MG/DL — SIGNIFICANT CHANGE UP (ref 0.2–1.2)
BILIRUB SERPL-MCNC: 0.3 MG/DL — SIGNIFICANT CHANGE UP (ref 0.2–1.2)
BLD GP AB SCN SERPL QL: NEGATIVE — SIGNIFICANT CHANGE UP
BUN SERPL-MCNC: 12 MG/DL — SIGNIFICANT CHANGE UP (ref 7–23)
BUN SERPL-MCNC: 9 MG/DL — SIGNIFICANT CHANGE UP (ref 7–23)
CALCIUM SERPL-MCNC: 9.1 MG/DL — SIGNIFICANT CHANGE UP (ref 8.4–10.5)
CALCIUM SERPL-MCNC: 9.6 MG/DL — SIGNIFICANT CHANGE UP (ref 8.5–10.5)
CHLORIDE SERPL-SCNC: 108 MMOL/L — SIGNIFICANT CHANGE UP (ref 96–108)
CHLORIDE SERPL-SCNC: 110 MMOL/L — HIGH (ref 96–108)
CO2 SERPL-SCNC: 21 MMOL/L — LOW (ref 22–31)
CO2 SERPL-SCNC: 25 MMOL/L — SIGNIFICANT CHANGE UP (ref 22–31)
CREAT SERPL-MCNC: 0.47 MG/DL — LOW (ref 0.5–1.3)
CREAT SERPL-MCNC: 0.52 MG/DL — SIGNIFICANT CHANGE UP (ref 0.5–1.3)
EGFR: 90 ML/MIN/1.73M2 — SIGNIFICANT CHANGE UP
EGFR: 90 ML/MIN/1.73M2 — SIGNIFICANT CHANGE UP
EGFR: 93 ML/MIN/1.73M2 — SIGNIFICANT CHANGE UP
EGFR: 93 ML/MIN/1.73M2 — SIGNIFICANT CHANGE UP
EOSINOPHIL # BLD AUTO: 0.07 K/UL — SIGNIFICANT CHANGE UP (ref 0–0.5)
EOSINOPHIL NFR BLD AUTO: 1.5 % — SIGNIFICANT CHANGE UP (ref 0–6)
GLUCOSE SERPL-MCNC: 107 MG/DL — HIGH (ref 70–99)
GLUCOSE SERPL-MCNC: 88 MG/DL — SIGNIFICANT CHANGE UP (ref 70–99)
HCT VFR BLD CALC: 23.6 % — LOW (ref 34.5–45)
HCT VFR BLD CALC: 25.6 % — LOW (ref 34.5–45)
HGB BLD-MCNC: 6 G/DL — CRITICAL LOW (ref 11.5–15.5)
HGB BLD-MCNC: 6.7 G/DL — CRITICAL LOW (ref 11.5–15.5)
IMM GRANULOCYTES # BLD AUTO: 0.02 K/UL — SIGNIFICANT CHANGE UP (ref 0–0.07)
IMM GRANULOCYTES NFR BLD AUTO: 0.4 % — SIGNIFICANT CHANGE UP (ref 0–0.9)
LACTATE BLDV-MCNC: 1.9 MMOL/L — SIGNIFICANT CHANGE UP (ref 0.5–2)
LACTATE SERPL-SCNC: 1.5 MMOL/L — SIGNIFICANT CHANGE UP (ref 0.5–2)
LYMPHOCYTES # BLD AUTO: 1.02 K/UL — SIGNIFICANT CHANGE UP (ref 1–3.3)
LYMPHOCYTES NFR BLD AUTO: 22.2 % — SIGNIFICANT CHANGE UP (ref 13–44)
MAGNESIUM SERPL-MCNC: 1.9 MG/DL — SIGNIFICANT CHANGE UP (ref 1.6–2.6)
MAGNESIUM SERPL-MCNC: 2 MG/DL — SIGNIFICANT CHANGE UP (ref 1.6–2.6)
MCHC RBC-ENTMCNC: 19.3 PG — LOW (ref 27–34)
MCHC RBC-ENTMCNC: 19.4 PG — LOW (ref 27–34)
MCHC RBC-ENTMCNC: 25.4 G/DL — LOW (ref 32–36)
MCHC RBC-ENTMCNC: 26.2 G/DL — LOW (ref 32–36)
MCV RBC AUTO: 73.6 FL — LOW (ref 80–100)
MCV RBC AUTO: 76.4 FL — LOW (ref 80–100)
MONOCYTES # BLD AUTO: 0.62 K/UL — SIGNIFICANT CHANGE UP (ref 0–0.9)
MONOCYTES NFR BLD AUTO: 13.5 % — SIGNIFICANT CHANGE UP (ref 2–14)
NEUTROPHILS # BLD AUTO: 2.81 K/UL — SIGNIFICANT CHANGE UP (ref 1.8–7.4)
NEUTROPHILS NFR BLD AUTO: 61.3 % — SIGNIFICANT CHANGE UP (ref 43–77)
NRBC # BLD AUTO: 0 K/UL — SIGNIFICANT CHANGE UP (ref 0–0)
NRBC # BLD AUTO: 0 K/UL — SIGNIFICANT CHANGE UP (ref 0–0)
NRBC # FLD: 0 K/UL — SIGNIFICANT CHANGE UP (ref 0–0)
NRBC # FLD: 0 K/UL — SIGNIFICANT CHANGE UP (ref 0–0)
NRBC BLD AUTO-RTO: 0 /100 WBCS — SIGNIFICANT CHANGE UP (ref 0–0)
NRBC BLD AUTO-RTO: 0 /100 WBCS — SIGNIFICANT CHANGE UP (ref 0–0)
OB PNL STL: NEGATIVE — SIGNIFICANT CHANGE UP
PCO2 BLDV: 44 MMHG — HIGH (ref 39–42)
PH BLDV: 7.35 — SIGNIFICANT CHANGE UP (ref 7.32–7.43)
PHOSPHATE SERPL-MCNC: 3.7 MG/DL — SIGNIFICANT CHANGE UP (ref 2.5–4.5)
PLATELET # BLD AUTO: 301 K/UL — SIGNIFICANT CHANGE UP (ref 150–400)
PLATELET # BLD AUTO: 306 K/UL — SIGNIFICANT CHANGE UP (ref 150–400)
PMV BLD: 10 FL — SIGNIFICANT CHANGE UP (ref 7–13)
PMV BLD: 9.5 FL — SIGNIFICANT CHANGE UP (ref 7–13)
PO2 BLDV: <35 MMHG — LOW (ref 25–45)
POTASSIUM SERPL-MCNC: 4 MMOL/L — SIGNIFICANT CHANGE UP (ref 3.5–5.3)
POTASSIUM SERPL-MCNC: 4.3 MMOL/L — SIGNIFICANT CHANGE UP (ref 3.5–5.3)
POTASSIUM SERPL-SCNC: 4 MMOL/L — SIGNIFICANT CHANGE UP (ref 3.5–5.3)
POTASSIUM SERPL-SCNC: 4.3 MMOL/L — SIGNIFICANT CHANGE UP (ref 3.5–5.3)
PROT SERPL-MCNC: 5.8 G/DL — LOW (ref 6–8.3)
PROT SERPL-MCNC: 7.2 G/DL — SIGNIFICANT CHANGE UP (ref 6.4–8.2)
RBC # BLD: 3.09 M/UL — LOW (ref 3.8–5.2)
RBC # BLD: 3.48 M/UL — LOW (ref 3.8–5.2)
RBC # FLD: 17.2 % — HIGH (ref 10.3–14.5)
RBC # FLD: 17.3 % — HIGH (ref 10.3–14.5)
RH IG SCN BLD-IMP: POSITIVE — SIGNIFICANT CHANGE UP
SAO2 % BLDV: 18.3 % — LOW (ref 67–88)
SODIUM SERPL-SCNC: 142 MMOL/L — SIGNIFICANT CHANGE UP (ref 132–145)
SODIUM SERPL-SCNC: 142 MMOL/L — SIGNIFICANT CHANGE UP (ref 135–145)
TROPONIN I, HIGH SENSITIVITY RESULT: 4.4 NG/L — SIGNIFICANT CHANGE UP
WBC # BLD: 4.59 K/UL — SIGNIFICANT CHANGE UP (ref 3.8–10.5)
WBC # BLD: 4.6 K/UL — SIGNIFICANT CHANGE UP (ref 3.8–10.5)
WBC # FLD AUTO: 4.59 K/UL — SIGNIFICANT CHANGE UP (ref 3.8–10.5)
WBC # FLD AUTO: 4.6 K/UL — SIGNIFICANT CHANGE UP (ref 3.8–10.5)

## 2025-08-01 PROCEDURE — 82962 GLUCOSE BLOOD TEST: CPT

## 2025-08-01 PROCEDURE — 36415 COLL VENOUS BLD VENIPUNCTURE: CPT

## 2025-08-01 PROCEDURE — 82803 BLOOD GASES ANY COMBINATION: CPT

## 2025-08-01 PROCEDURE — 99285 EMERGENCY DEPT VISIT HI MDM: CPT

## 2025-08-01 PROCEDURE — 74019 RADEX ABDOMEN 2 VIEWS: CPT | Mod: 26

## 2025-08-01 PROCEDURE — 99222 1ST HOSP IP/OBS MODERATE 55: CPT | Mod: GC

## 2025-08-01 PROCEDURE — 82272 OCCULT BLD FECES 1-3 TESTS: CPT

## 2025-08-01 PROCEDURE — 85025 COMPLETE CBC W/AUTO DIFF WBC: CPT

## 2025-08-01 PROCEDURE — 71045 X-RAY EXAM CHEST 1 VIEW: CPT | Mod: 26

## 2025-08-01 PROCEDURE — 84484 ASSAY OF TROPONIN QUANT: CPT

## 2025-08-01 PROCEDURE — 70450 CT HEAD/BRAIN W/O DYE: CPT | Mod: 26

## 2025-08-01 PROCEDURE — 85027 COMPLETE CBC AUTOMATED: CPT

## 2025-08-01 PROCEDURE — 80053 COMPREHEN METABOLIC PANEL: CPT

## 2025-08-01 PROCEDURE — 84100 ASSAY OF PHOSPHORUS: CPT

## 2025-08-01 PROCEDURE — 83735 ASSAY OF MAGNESIUM: CPT

## 2025-08-01 PROCEDURE — 83605 ASSAY OF LACTIC ACID: CPT

## 2025-08-01 RX ORDER — ACETAMINOPHEN 500 MG/5ML
650 LIQUID (ML) ORAL EVERY 6 HOURS
Refills: 0 | Status: DISCONTINUED | OUTPATIENT
Start: 2025-08-01 | End: 2025-08-07

## 2025-08-01 RX ORDER — MELATONIN 5 MG
3 TABLET ORAL AT BEDTIME
Refills: 0 | Status: DISCONTINUED | OUTPATIENT
Start: 2025-08-01 | End: 2025-08-07

## 2025-08-01 RX ORDER — MAGNESIUM, ALUMINUM HYDROXIDE 200-200 MG
30 TABLET,CHEWABLE ORAL EVERY 4 HOURS
Refills: 0 | Status: DISCONTINUED | OUTPATIENT
Start: 2025-08-01 | End: 2025-08-06

## 2025-08-01 RX ORDER — LEVOTHYROXINE SODIUM 300 MCG
88 TABLET ORAL DAILY
Refills: 0 | Status: DISCONTINUED | OUTPATIENT
Start: 2025-08-01 | End: 2025-08-07

## 2025-08-01 RX ORDER — ENOXAPARIN SODIUM 100 MG/ML
40 INJECTION SUBCUTANEOUS EVERY 24 HOURS
Refills: 0 | Status: DISCONTINUED | OUTPATIENT
Start: 2025-08-01 | End: 2025-08-01

## 2025-08-01 RX ORDER — ASPIRIN 325 MG
81 TABLET ORAL DAILY
Refills: 0 | Status: DISCONTINUED | OUTPATIENT
Start: 2025-08-01 | End: 2025-08-02

## 2025-08-01 RX ORDER — ROSUVASTATIN CALCIUM 20 MG/1
20 TABLET, FILM COATED ORAL AT BEDTIME
Refills: 0 | Status: DISCONTINUED | OUTPATIENT
Start: 2025-08-01 | End: 2025-08-07

## 2025-08-01 RX ORDER — ACETAMINOPHEN 500 MG/5ML
650 LIQUID (ML) ORAL ONCE
Refills: 0 | Status: COMPLETED | OUTPATIENT
Start: 2025-08-01 | End: 2025-08-02

## 2025-08-01 RX ORDER — ONDANSETRON HCL/PF 4 MG/2 ML
4 VIAL (ML) INJECTION EVERY 8 HOURS
Refills: 0 | Status: DISCONTINUED | OUTPATIENT
Start: 2025-08-01 | End: 2025-08-07

## 2025-08-01 RX ADMIN — Medication 650 MILLIGRAM(S): at 22:14

## 2025-08-01 RX ADMIN — Medication 650 MILLIGRAM(S): at 23:14

## 2025-08-01 RX ADMIN — Medication 1000 MILLILITER(S): at 12:44

## 2025-08-01 RX ADMIN — ROSUVASTATIN CALCIUM 20 MILLIGRAM(S): 20 TABLET, FILM COATED ORAL at 22:14

## 2025-08-02 DIAGNOSIS — R33.9 RETENTION OF URINE, UNSPECIFIED: ICD-10-CM

## 2025-08-02 LAB
ALBUMIN SERPL ELPH-MCNC: 3.6 G/DL — SIGNIFICANT CHANGE UP (ref 3.3–5)
ALBUMIN SERPL ELPH-MCNC: 4 G/DL — SIGNIFICANT CHANGE UP (ref 3.3–5)
ALP SERPL-CCNC: 33 U/L — LOW (ref 40–120)
ALP SERPL-CCNC: 41 U/L — SIGNIFICANT CHANGE UP (ref 40–120)
ALT FLD-CCNC: 5 U/L — LOW (ref 10–45)
ALT FLD-CCNC: <5 U/L — LOW (ref 10–45)
ANION GAP SERPL CALC-SCNC: 10 MMOL/L — SIGNIFICANT CHANGE UP (ref 5–17)
ANION GAP SERPL CALC-SCNC: 12 MMOL/L — SIGNIFICANT CHANGE UP (ref 5–17)
APPEARANCE UR: CLEAR — SIGNIFICANT CHANGE UP
AST SERPL-CCNC: 10 U/L — SIGNIFICANT CHANGE UP (ref 10–40)
AST SERPL-CCNC: 11 U/L — SIGNIFICANT CHANGE UP (ref 10–40)
BASOPHILS # BLD AUTO: 0.03 K/UL — SIGNIFICANT CHANGE UP (ref 0–0.2)
BASOPHILS # BLD AUTO: 0.03 K/UL — SIGNIFICANT CHANGE UP (ref 0–0.2)
BASOPHILS NFR BLD AUTO: 0.5 % — SIGNIFICANT CHANGE UP (ref 0–2)
BASOPHILS NFR BLD AUTO: 0.8 % — SIGNIFICANT CHANGE UP (ref 0–2)
BILIRUB SERPL-MCNC: 0.6 MG/DL — SIGNIFICANT CHANGE UP (ref 0.2–1.2)
BILIRUB SERPL-MCNC: 0.7 MG/DL — SIGNIFICANT CHANGE UP (ref 0.2–1.2)
BILIRUB UR-MCNC: NEGATIVE — SIGNIFICANT CHANGE UP
BUN SERPL-MCNC: 11 MG/DL — SIGNIFICANT CHANGE UP (ref 7–23)
BUN SERPL-MCNC: 13 MG/DL — SIGNIFICANT CHANGE UP (ref 7–23)
CALCIUM SERPL-MCNC: 9 MG/DL — SIGNIFICANT CHANGE UP (ref 8.4–10.5)
CALCIUM SERPL-MCNC: 9.1 MG/DL — SIGNIFICANT CHANGE UP (ref 8.4–10.5)
CHLORIDE SERPL-SCNC: 110 MMOL/L — HIGH (ref 96–108)
CHLORIDE SERPL-SCNC: 112 MMOL/L — HIGH (ref 96–108)
CHOLEST SERPL-MCNC: 99 MG/DL — SIGNIFICANT CHANGE UP
CO2 SERPL-SCNC: 20 MMOL/L — LOW (ref 22–31)
CO2 SERPL-SCNC: 21 MMOL/L — LOW (ref 22–31)
COLOR SPEC: YELLOW — SIGNIFICANT CHANGE UP
CREAT SERPL-MCNC: 0.6 MG/DL — SIGNIFICANT CHANGE UP (ref 0.5–1.3)
CREAT SERPL-MCNC: 0.77 MG/DL — SIGNIFICANT CHANGE UP (ref 0.5–1.3)
DIFF PNL FLD: NEGATIVE — SIGNIFICANT CHANGE UP
EGFR: 75 ML/MIN/1.73M2 — SIGNIFICANT CHANGE UP
EGFR: 75 ML/MIN/1.73M2 — SIGNIFICANT CHANGE UP
EGFR: 87 ML/MIN/1.73M2 — SIGNIFICANT CHANGE UP
EGFR: 87 ML/MIN/1.73M2 — SIGNIFICANT CHANGE UP
EOSINOPHIL # BLD AUTO: 0.01 K/UL — SIGNIFICANT CHANGE UP (ref 0–0.5)
EOSINOPHIL # BLD AUTO: 0.04 K/UL — SIGNIFICANT CHANGE UP (ref 0–0.5)
EOSINOPHIL NFR BLD AUTO: 0.2 % — SIGNIFICANT CHANGE UP (ref 0–6)
EOSINOPHIL NFR BLD AUTO: 1 % — SIGNIFICANT CHANGE UP (ref 0–6)
FERRITIN SERPL-MCNC: 6 NG/ML — LOW (ref 13–330)
FOLATE SERPL-MCNC: 11.8 NG/ML — SIGNIFICANT CHANGE UP
GLUCOSE SERPL-MCNC: 111 MG/DL — HIGH (ref 70–99)
GLUCOSE SERPL-MCNC: 82 MG/DL — SIGNIFICANT CHANGE UP (ref 70–99)
GLUCOSE UR QL: NEGATIVE MG/DL — SIGNIFICANT CHANGE UP
HCT VFR BLD CALC: 25.6 % — LOW (ref 34.5–45)
HCT VFR BLD CALC: 28.1 % — LOW (ref 34.5–45)
HCT VFR BLD CALC: 33 % — LOW (ref 34.5–45)
HCT VFR BLD CALC: 33.6 % — LOW (ref 34.5–45)
HDLC SERPL-MCNC: 51 MG/DL — SIGNIFICANT CHANGE UP
HGB BLD-MCNC: 7.1 G/DL — LOW (ref 11.5–15.5)
HGB BLD-MCNC: 7.7 G/DL — LOW (ref 11.5–15.5)
HGB BLD-MCNC: 9.3 G/DL — LOW (ref 11.5–15.5)
HGB BLD-MCNC: 9.7 G/DL — LOW (ref 11.5–15.5)
IMM GRANULOCYTES # BLD AUTO: 0.01 K/UL — SIGNIFICANT CHANGE UP (ref 0–0.07)
IMM GRANULOCYTES # BLD AUTO: 0.01 K/UL — SIGNIFICANT CHANGE UP (ref 0–0.07)
IMM GRANULOCYTES NFR BLD AUTO: 0.2 % — SIGNIFICANT CHANGE UP (ref 0–0.9)
IMM GRANULOCYTES NFR BLD AUTO: 0.3 % — SIGNIFICANT CHANGE UP (ref 0–0.9)
IRON SATN MFR SERPL: 123 UG/DL — SIGNIFICANT CHANGE UP (ref 30–160)
IRON SATN MFR SERPL: 41 % — SIGNIFICANT CHANGE UP (ref 14–50)
KETONES UR QL: NEGATIVE MG/DL — SIGNIFICANT CHANGE UP
LDH SERPL L TO P-CCNC: 142 U/L — SIGNIFICANT CHANGE UP (ref 50–242)
LDLC SERPL-MCNC: 35 MG/DL — SIGNIFICANT CHANGE UP
LEUKOCYTE ESTERASE UR-ACNC: ABNORMAL
LIPID PNL WITH DIRECT LDL SERPL: 35 MG/DL — SIGNIFICANT CHANGE UP
LYMPHOCYTES # BLD AUTO: 1.13 K/UL — SIGNIFICANT CHANGE UP (ref 1–3.3)
LYMPHOCYTES # BLD AUTO: 1.17 K/UL — SIGNIFICANT CHANGE UP (ref 1–3.3)
LYMPHOCYTES NFR BLD AUTO: 18.1 % — SIGNIFICANT CHANGE UP (ref 13–44)
LYMPHOCYTES NFR BLD AUTO: 29.3 % — SIGNIFICANT CHANGE UP (ref 13–44)
MAGNESIUM SERPL-MCNC: 2 MG/DL — SIGNIFICANT CHANGE UP (ref 1.6–2.6)
MCHC RBC-ENTMCNC: 20.5 PG — LOW (ref 27–34)
MCHC RBC-ENTMCNC: 20.7 PG — LOW (ref 27–34)
MCHC RBC-ENTMCNC: 21.8 PG — LOW (ref 27–34)
MCHC RBC-ENTMCNC: 22.1 PG — LOW (ref 27–34)
MCHC RBC-ENTMCNC: 27.4 G/DL — LOW (ref 32–36)
MCHC RBC-ENTMCNC: 27.7 G/DL — LOW (ref 32–36)
MCHC RBC-ENTMCNC: 28.2 G/DL — LOW (ref 32–36)
MCHC RBC-ENTMCNC: 28.9 G/DL — LOW (ref 32–36)
MCV RBC AUTO: 74 FL — LOW (ref 80–100)
MCV RBC AUTO: 75.5 FL — LOW (ref 80–100)
MCV RBC AUTO: 76.7 FL — LOW (ref 80–100)
MCV RBC AUTO: 77.3 FL — LOW (ref 80–100)
MONOCYTES # BLD AUTO: 0.63 K/UL — SIGNIFICANT CHANGE UP (ref 0–0.9)
MONOCYTES # BLD AUTO: 0.69 K/UL — SIGNIFICANT CHANGE UP (ref 0–0.9)
MONOCYTES NFR BLD AUTO: 11 % — SIGNIFICANT CHANGE UP (ref 2–14)
MONOCYTES NFR BLD AUTO: 15.8 % — HIGH (ref 2–14)
NEUTROPHILS # BLD AUTO: 2.11 K/UL — SIGNIFICANT CHANGE UP (ref 1.8–7.4)
NEUTROPHILS # BLD AUTO: 4.39 K/UL — SIGNIFICANT CHANGE UP (ref 1.8–7.4)
NEUTROPHILS NFR BLD AUTO: 52.8 % — SIGNIFICANT CHANGE UP (ref 43–77)
NEUTROPHILS NFR BLD AUTO: 70 % — SIGNIFICANT CHANGE UP (ref 43–77)
NITRITE UR-MCNC: NEGATIVE — SIGNIFICANT CHANGE UP
NONHDLC SERPL-MCNC: 48 MG/DL — SIGNIFICANT CHANGE UP
NRBC # BLD AUTO: 0 K/UL — SIGNIFICANT CHANGE UP (ref 0–0)
NRBC # FLD: 0 K/UL — SIGNIFICANT CHANGE UP (ref 0–0)
NRBC BLD AUTO-RTO: 0 /100 WBCS — SIGNIFICANT CHANGE UP (ref 0–0)
PH UR: 7 — SIGNIFICANT CHANGE UP (ref 5–8)
PHOSPHATE SERPL-MCNC: 4.1 MG/DL — SIGNIFICANT CHANGE UP (ref 2.5–4.5)
PLATELET # BLD AUTO: 246 K/UL — SIGNIFICANT CHANGE UP (ref 150–400)
PLATELET # BLD AUTO: 253 K/UL — SIGNIFICANT CHANGE UP (ref 150–400)
PLATELET # BLD AUTO: 259 K/UL — SIGNIFICANT CHANGE UP (ref 150–400)
PLATELET # BLD AUTO: 279 K/UL — SIGNIFICANT CHANGE UP (ref 150–400)
PMV BLD: 10 FL — SIGNIFICANT CHANGE UP (ref 7–13)
PMV BLD: 10.1 FL — SIGNIFICANT CHANGE UP (ref 7–13)
PMV BLD: 9.8 FL — SIGNIFICANT CHANGE UP (ref 7–13)
PMV BLD: 9.9 FL — SIGNIFICANT CHANGE UP (ref 7–13)
POTASSIUM SERPL-MCNC: 4 MMOL/L — SIGNIFICANT CHANGE UP (ref 3.5–5.3)
POTASSIUM SERPL-MCNC: 4.2 MMOL/L — SIGNIFICANT CHANGE UP (ref 3.5–5.3)
POTASSIUM SERPL-SCNC: 4 MMOL/L — SIGNIFICANT CHANGE UP (ref 3.5–5.3)
POTASSIUM SERPL-SCNC: 4.2 MMOL/L — SIGNIFICANT CHANGE UP (ref 3.5–5.3)
PROT SERPL-MCNC: 5.3 G/DL — LOW (ref 6–8.3)
PROT SERPL-MCNC: 6.3 G/DL — SIGNIFICANT CHANGE UP (ref 6–8.3)
PROT UR-MCNC: NEGATIVE MG/DL — SIGNIFICANT CHANGE UP
RBC # BLD: 3.46 M/UL — LOW (ref 3.8–5.2)
RBC # BLD: 3.72 M/UL — LOW (ref 3.8–5.2)
RBC # BLD: 4.27 M/UL — SIGNIFICANT CHANGE UP (ref 3.8–5.2)
RBC # BLD: 4.38 M/UL — SIGNIFICANT CHANGE UP (ref 3.8–5.2)
RBC # FLD: 16.4 % — HIGH (ref 10.3–14.5)
RBC # FLD: 16.7 % — HIGH (ref 10.3–14.5)
RBC # FLD: 16.8 % — HIGH (ref 10.3–14.5)
RBC # FLD: 17 % — HIGH (ref 10.3–14.5)
SODIUM SERPL-SCNC: 141 MMOL/L — SIGNIFICANT CHANGE UP (ref 135–145)
SODIUM SERPL-SCNC: 144 MMOL/L — SIGNIFICANT CHANGE UP (ref 135–145)
SP GR SPEC: 1.01 — SIGNIFICANT CHANGE UP (ref 1–1.03)
TIBC SERPL-MCNC: 302 UG/DL — SIGNIFICANT CHANGE UP (ref 220–430)
TRANSFERRIN SERPL-MCNC: 243 MG/DL — SIGNIFICANT CHANGE UP (ref 200–360)
TRIGL SERPL-MCNC: 56 MG/DL — SIGNIFICANT CHANGE UP
TSH SERPL-MCNC: 0.64 UIU/ML — SIGNIFICANT CHANGE UP (ref 0.27–4.2)
UIBC SERPL-MCNC: 179 UG/DL — SIGNIFICANT CHANGE UP (ref 110–370)
UROBILINOGEN FLD QL: 1 MG/DL — SIGNIFICANT CHANGE UP (ref 0.2–1)
VIT B12 SERPL-MCNC: 1396 PG/ML — HIGH (ref 232–1245)
WBC # BLD: 3.99 K/UL — SIGNIFICANT CHANGE UP (ref 3.8–10.5)
WBC # BLD: 4.86 K/UL — SIGNIFICANT CHANGE UP (ref 3.8–10.5)
WBC # BLD: 6.26 K/UL — SIGNIFICANT CHANGE UP (ref 3.8–10.5)
WBC # BLD: 6.49 K/UL — SIGNIFICANT CHANGE UP (ref 3.8–10.5)
WBC # FLD AUTO: 3.99 K/UL — SIGNIFICANT CHANGE UP (ref 3.8–10.5)
WBC # FLD AUTO: 4.86 K/UL — SIGNIFICANT CHANGE UP (ref 3.8–10.5)
WBC # FLD AUTO: 6.26 K/UL — SIGNIFICANT CHANGE UP (ref 3.8–10.5)
WBC # FLD AUTO: 6.49 K/UL — SIGNIFICANT CHANGE UP (ref 3.8–10.5)

## 2025-08-02 PROCEDURE — 83735 ASSAY OF MAGNESIUM: CPT

## 2025-08-02 PROCEDURE — 99233 SBSQ HOSP IP/OBS HIGH 50: CPT

## 2025-08-02 PROCEDURE — 85025 COMPLETE CBC W/AUTO DIFF WBC: CPT

## 2025-08-02 PROCEDURE — 82962 GLUCOSE BLOOD TEST: CPT

## 2025-08-02 PROCEDURE — 84100 ASSAY OF PHOSPHORUS: CPT

## 2025-08-02 PROCEDURE — 81001 URINALYSIS AUTO W/SCOPE: CPT

## 2025-08-02 PROCEDURE — 84484 ASSAY OF TROPONIN QUANT: CPT

## 2025-08-02 PROCEDURE — 82803 BLOOD GASES ANY COMBINATION: CPT

## 2025-08-02 PROCEDURE — 83605 ASSAY OF LACTIC ACID: CPT

## 2025-08-02 PROCEDURE — 36415 COLL VENOUS BLD VENIPUNCTURE: CPT

## 2025-08-02 PROCEDURE — 84466 ASSAY OF TRANSFERRIN: CPT

## 2025-08-02 PROCEDURE — 82728 ASSAY OF FERRITIN: CPT

## 2025-08-02 PROCEDURE — 82746 ASSAY OF FOLIC ACID SERUM: CPT

## 2025-08-02 PROCEDURE — 85027 COMPLETE CBC AUTOMATED: CPT

## 2025-08-02 PROCEDURE — 84443 ASSAY THYROID STIM HORMONE: CPT

## 2025-08-02 PROCEDURE — 82607 VITAMIN B-12: CPT

## 2025-08-02 PROCEDURE — 83540 ASSAY OF IRON: CPT

## 2025-08-02 PROCEDURE — 87086 URINE CULTURE/COLONY COUNT: CPT

## 2025-08-02 PROCEDURE — 83550 IRON BINDING TEST: CPT

## 2025-08-02 PROCEDURE — 83615 LACTATE (LD) (LDH) ENZYME: CPT

## 2025-08-02 PROCEDURE — 80053 COMPREHEN METABOLIC PANEL: CPT

## 2025-08-02 PROCEDURE — 82272 OCCULT BLD FECES 1-3 TESTS: CPT

## 2025-08-02 PROCEDURE — 80061 LIPID PANEL: CPT

## 2025-08-02 RX ORDER — POLYETHYLENE GLYCOL 3350 17 G/17G
17 POWDER, FOR SOLUTION ORAL DAILY
Refills: 0 | Status: DISCONTINUED | OUTPATIENT
Start: 2025-08-02 | End: 2025-08-07

## 2025-08-02 RX ORDER — SENNA 187 MG
1 TABLET ORAL DAILY
Refills: 0 | Status: DISCONTINUED | OUTPATIENT
Start: 2025-08-02 | End: 2025-08-07

## 2025-08-02 RX ADMIN — ROSUVASTATIN CALCIUM 20 MILLIGRAM(S): 20 TABLET, FILM COATED ORAL at 20:55

## 2025-08-02 RX ADMIN — Medication 88 MICROGRAM(S): at 06:06

## 2025-08-02 RX ADMIN — Medication 40 MILLIGRAM(S): at 20:55

## 2025-08-02 RX ADMIN — Medication 1 TABLET(S): at 10:59

## 2025-08-02 RX ADMIN — Medication 650 MILLIGRAM(S): at 06:32

## 2025-08-02 RX ADMIN — Medication 260 MILLIGRAM(S): at 06:06

## 2025-08-02 RX ADMIN — Medication 40 MILLIGRAM(S): at 10:59

## 2025-08-03 LAB
ALBUMIN SERPL ELPH-MCNC: 3.7 G/DL — SIGNIFICANT CHANGE UP (ref 3.3–5)
ALP SERPL-CCNC: 36 U/L — LOW (ref 40–120)
ALT FLD-CCNC: 6 U/L — LOW (ref 10–45)
ANION GAP SERPL CALC-SCNC: 11 MMOL/L — SIGNIFICANT CHANGE UP (ref 5–17)
AST SERPL-CCNC: 12 U/L — SIGNIFICANT CHANGE UP (ref 10–40)
BASOPHILS # BLD AUTO: 0.04 K/UL — SIGNIFICANT CHANGE UP (ref 0–0.2)
BASOPHILS NFR BLD AUTO: 0.8 % — SIGNIFICANT CHANGE UP (ref 0–2)
BILIRUB SERPL-MCNC: 0.4 MG/DL — SIGNIFICANT CHANGE UP (ref 0.2–1.2)
BUN SERPL-MCNC: 15 MG/DL — SIGNIFICANT CHANGE UP (ref 7–23)
CALCIUM SERPL-MCNC: 8.9 MG/DL — SIGNIFICANT CHANGE UP (ref 8.4–10.5)
CHLORIDE SERPL-SCNC: 112 MMOL/L — HIGH (ref 96–108)
CO2 SERPL-SCNC: 19 MMOL/L — LOW (ref 22–31)
CREAT SERPL-MCNC: 0.52 MG/DL — SIGNIFICANT CHANGE UP (ref 0.5–1.3)
EGFR: 90 ML/MIN/1.73M2 — SIGNIFICANT CHANGE UP
EGFR: 90 ML/MIN/1.73M2 — SIGNIFICANT CHANGE UP
EOSINOPHIL # BLD AUTO: 0.08 K/UL — SIGNIFICANT CHANGE UP (ref 0–0.5)
EOSINOPHIL NFR BLD AUTO: 1.6 % — SIGNIFICANT CHANGE UP (ref 0–6)
GLUCOSE SERPL-MCNC: 72 MG/DL — SIGNIFICANT CHANGE UP (ref 70–99)
HCT VFR BLD CALC: 32.9 % — LOW (ref 34.5–45)
HGB BLD-MCNC: 9.6 G/DL — LOW (ref 11.5–15.5)
IMM GRANULOCYTES # BLD AUTO: 0.02 K/UL — SIGNIFICANT CHANGE UP (ref 0–0.07)
IMM GRANULOCYTES NFR BLD AUTO: 0.4 % — SIGNIFICANT CHANGE UP (ref 0–0.9)
LYMPHOCYTES # BLD AUTO: 0.96 K/UL — LOW (ref 1–3.3)
LYMPHOCYTES NFR BLD AUTO: 19.5 % — SIGNIFICANT CHANGE UP (ref 13–44)
MAGNESIUM SERPL-MCNC: 1.8 MG/DL — SIGNIFICANT CHANGE UP (ref 1.6–2.6)
MCHC RBC-ENTMCNC: 22.2 PG — LOW (ref 27–34)
MCHC RBC-ENTMCNC: 29.2 G/DL — LOW (ref 32–36)
MCV RBC AUTO: 76 FL — LOW (ref 80–100)
MONOCYTES # BLD AUTO: 0.77 K/UL — SIGNIFICANT CHANGE UP (ref 0–0.9)
MONOCYTES NFR BLD AUTO: 15.6 % — HIGH (ref 2–14)
NEUTROPHILS # BLD AUTO: 3.06 K/UL — SIGNIFICANT CHANGE UP (ref 1.8–7.4)
NEUTROPHILS NFR BLD AUTO: 62.1 % — SIGNIFICANT CHANGE UP (ref 43–77)
NRBC # BLD AUTO: 0 K/UL — SIGNIFICANT CHANGE UP (ref 0–0)
NRBC # FLD: 0 K/UL — SIGNIFICANT CHANGE UP (ref 0–0)
NRBC BLD AUTO-RTO: 0 /100 WBCS — SIGNIFICANT CHANGE UP (ref 0–0)
PHOSPHATE SERPL-MCNC: 3.6 MG/DL — SIGNIFICANT CHANGE UP (ref 2.5–4.5)
PLATELET # BLD AUTO: 244 K/UL — SIGNIFICANT CHANGE UP (ref 150–400)
PMV BLD: 9.9 FL — SIGNIFICANT CHANGE UP (ref 7–13)
POTASSIUM SERPL-MCNC: 3.9 MMOL/L — SIGNIFICANT CHANGE UP (ref 3.5–5.3)
POTASSIUM SERPL-SCNC: 3.9 MMOL/L — SIGNIFICANT CHANGE UP (ref 3.5–5.3)
PROT SERPL-MCNC: 5.9 G/DL — LOW (ref 6–8.3)
RBC # BLD: 4.33 M/UL — SIGNIFICANT CHANGE UP (ref 3.8–5.2)
RBC # FLD: 16.9 % — HIGH (ref 10.3–14.5)
SODIUM SERPL-SCNC: 142 MMOL/L — SIGNIFICANT CHANGE UP (ref 135–145)
WBC # BLD: 4.93 K/UL — SIGNIFICANT CHANGE UP (ref 3.8–10.5)
WBC # FLD AUTO: 4.93 K/UL — SIGNIFICANT CHANGE UP (ref 3.8–10.5)

## 2025-08-03 PROCEDURE — 82607 VITAMIN B-12: CPT

## 2025-08-03 PROCEDURE — 83735 ASSAY OF MAGNESIUM: CPT

## 2025-08-03 PROCEDURE — 83550 IRON BINDING TEST: CPT

## 2025-08-03 PROCEDURE — 84443 ASSAY THYROID STIM HORMONE: CPT

## 2025-08-03 PROCEDURE — 84484 ASSAY OF TROPONIN QUANT: CPT

## 2025-08-03 PROCEDURE — 71045 X-RAY EXAM CHEST 1 VIEW: CPT

## 2025-08-03 PROCEDURE — 87086 URINE CULTURE/COLONY COUNT: CPT

## 2025-08-03 PROCEDURE — 80053 COMPREHEN METABOLIC PANEL: CPT

## 2025-08-03 PROCEDURE — 84100 ASSAY OF PHOSPHORUS: CPT

## 2025-08-03 PROCEDURE — 36415 COLL VENOUS BLD VENIPUNCTURE: CPT

## 2025-08-03 PROCEDURE — 99222 1ST HOSP IP/OBS MODERATE 55: CPT

## 2025-08-03 PROCEDURE — 82272 OCCULT BLD FECES 1-3 TESTS: CPT

## 2025-08-03 PROCEDURE — 87077 CULTURE AEROBIC IDENTIFY: CPT

## 2025-08-03 PROCEDURE — 84466 ASSAY OF TRANSFERRIN: CPT

## 2025-08-03 PROCEDURE — 81001 URINALYSIS AUTO W/SCOPE: CPT

## 2025-08-03 PROCEDURE — 82746 ASSAY OF FOLIC ACID SERUM: CPT

## 2025-08-03 PROCEDURE — 83615 LACTATE (LD) (LDH) ENZYME: CPT

## 2025-08-03 PROCEDURE — 85025 COMPLETE CBC W/AUTO DIFF WBC: CPT

## 2025-08-03 PROCEDURE — 85027 COMPLETE CBC AUTOMATED: CPT

## 2025-08-03 PROCEDURE — 74019 RADEX ABDOMEN 2 VIEWS: CPT

## 2025-08-03 PROCEDURE — 80061 LIPID PANEL: CPT

## 2025-08-03 PROCEDURE — 83540 ASSAY OF IRON: CPT

## 2025-08-03 PROCEDURE — 82803 BLOOD GASES ANY COMBINATION: CPT

## 2025-08-03 PROCEDURE — 82728 ASSAY OF FERRITIN: CPT

## 2025-08-03 PROCEDURE — 82962 GLUCOSE BLOOD TEST: CPT

## 2025-08-03 PROCEDURE — 83605 ASSAY OF LACTIC ACID: CPT

## 2025-08-03 PROCEDURE — 99233 SBSQ HOSP IP/OBS HIGH 50: CPT

## 2025-08-03 PROCEDURE — 70450 CT HEAD/BRAIN W/O DYE: CPT

## 2025-08-03 RX ADMIN — Medication 40 MILLIGRAM(S): at 10:13

## 2025-08-03 RX ADMIN — ROSUVASTATIN CALCIUM 20 MILLIGRAM(S): 20 TABLET, FILM COATED ORAL at 21:04

## 2025-08-03 RX ADMIN — Medication 88 MICROGRAM(S): at 05:56

## 2025-08-03 RX ADMIN — Medication 40 MILLIGRAM(S): at 21:04

## 2025-08-04 LAB
ANION GAP SERPL CALC-SCNC: 11 MMOL/L — SIGNIFICANT CHANGE UP (ref 5–17)
BASOPHILS # BLD AUTO: 0.04 K/UL — SIGNIFICANT CHANGE UP (ref 0–0.2)
BASOPHILS NFR BLD AUTO: 0.8 % — SIGNIFICANT CHANGE UP (ref 0–2)
BUN SERPL-MCNC: 16 MG/DL — SIGNIFICANT CHANGE UP (ref 7–23)
CALCIUM SERPL-MCNC: 9.1 MG/DL — SIGNIFICANT CHANGE UP (ref 8.4–10.5)
CHLORIDE SERPL-SCNC: 109 MMOL/L — HIGH (ref 96–108)
CO2 SERPL-SCNC: 20 MMOL/L — LOW (ref 22–31)
CREAT SERPL-MCNC: 0.48 MG/DL — LOW (ref 0.5–1.3)
EGFR: 92 ML/MIN/1.73M2 — SIGNIFICANT CHANGE UP
EGFR: 92 ML/MIN/1.73M2 — SIGNIFICANT CHANGE UP
EOSINOPHIL # BLD AUTO: 0.1 K/UL — SIGNIFICANT CHANGE UP (ref 0–0.5)
EOSINOPHIL NFR BLD AUTO: 1.9 % — SIGNIFICANT CHANGE UP (ref 0–6)
GLUCOSE SERPL-MCNC: 78 MG/DL — SIGNIFICANT CHANGE UP (ref 70–99)
HCT VFR BLD CALC: 34.1 % — LOW (ref 34.5–45)
HGB BLD-MCNC: 9.6 G/DL — LOW (ref 11.5–15.5)
IMM GRANULOCYTES # BLD AUTO: 0.02 K/UL — SIGNIFICANT CHANGE UP (ref 0–0.07)
IMM GRANULOCYTES NFR BLD AUTO: 0.4 % — SIGNIFICANT CHANGE UP (ref 0–0.9)
LYMPHOCYTES # BLD AUTO: 1.19 K/UL — SIGNIFICANT CHANGE UP (ref 1–3.3)
LYMPHOCYTES NFR BLD AUTO: 22.5 % — SIGNIFICANT CHANGE UP (ref 13–44)
MAGNESIUM SERPL-MCNC: 1.9 MG/DL — SIGNIFICANT CHANGE UP (ref 1.6–2.6)
MCHC RBC-ENTMCNC: 21.9 PG — LOW (ref 27–34)
MCHC RBC-ENTMCNC: 28.2 G/DL — LOW (ref 32–36)
MCV RBC AUTO: 77.7 FL — LOW (ref 80–100)
MONOCYTES # BLD AUTO: 0.75 K/UL — SIGNIFICANT CHANGE UP (ref 0–0.9)
MONOCYTES NFR BLD AUTO: 14.2 % — HIGH (ref 2–14)
NEUTROPHILS # BLD AUTO: 3.18 K/UL — SIGNIFICANT CHANGE UP (ref 1.8–7.4)
NEUTROPHILS NFR BLD AUTO: 60.2 % — SIGNIFICANT CHANGE UP (ref 43–77)
NRBC # BLD AUTO: 0 K/UL — SIGNIFICANT CHANGE UP (ref 0–0)
NRBC # FLD: 0 K/UL — SIGNIFICANT CHANGE UP (ref 0–0)
NRBC BLD AUTO-RTO: 0 /100 WBCS — SIGNIFICANT CHANGE UP (ref 0–0)
PHOSPHATE SERPL-MCNC: 3.1 MG/DL — SIGNIFICANT CHANGE UP (ref 2.5–4.5)
PLATELET # BLD AUTO: 239 K/UL — SIGNIFICANT CHANGE UP (ref 150–400)
PMV BLD: 9.6 FL — SIGNIFICANT CHANGE UP (ref 7–13)
POTASSIUM SERPL-MCNC: 4 MMOL/L — SIGNIFICANT CHANGE UP (ref 3.5–5.3)
POTASSIUM SERPL-SCNC: 4 MMOL/L — SIGNIFICANT CHANGE UP (ref 3.5–5.3)
RBC # BLD: 4.39 M/UL — SIGNIFICANT CHANGE UP (ref 3.8–5.2)
RBC # FLD: 17.8 % — HIGH (ref 10.3–14.5)
SODIUM SERPL-SCNC: 140 MMOL/L — SIGNIFICANT CHANGE UP (ref 135–145)
WBC # BLD: 5.28 K/UL — SIGNIFICANT CHANGE UP (ref 3.8–10.5)
WBC # FLD AUTO: 5.28 K/UL — SIGNIFICANT CHANGE UP (ref 3.8–10.5)

## 2025-08-04 PROCEDURE — 82962 GLUCOSE BLOOD TEST: CPT

## 2025-08-04 PROCEDURE — 86900 BLOOD TYPING SEROLOGIC ABO: CPT

## 2025-08-04 PROCEDURE — 87086 URINE CULTURE/COLONY COUNT: CPT

## 2025-08-04 PROCEDURE — P9040: CPT

## 2025-08-04 PROCEDURE — 85027 COMPLETE CBC AUTOMATED: CPT

## 2025-08-04 PROCEDURE — 83735 ASSAY OF MAGNESIUM: CPT

## 2025-08-04 PROCEDURE — 83615 LACTATE (LD) (LDH) ENZYME: CPT

## 2025-08-04 PROCEDURE — 85025 COMPLETE CBC W/AUTO DIFF WBC: CPT

## 2025-08-04 PROCEDURE — 86923 COMPATIBILITY TEST ELECTRIC: CPT

## 2025-08-04 PROCEDURE — 80053 COMPREHEN METABOLIC PANEL: CPT

## 2025-08-04 PROCEDURE — 81001 URINALYSIS AUTO W/SCOPE: CPT

## 2025-08-04 PROCEDURE — 87077 CULTURE AEROBIC IDENTIFY: CPT

## 2025-08-04 PROCEDURE — 82728 ASSAY OF FERRITIN: CPT

## 2025-08-04 PROCEDURE — 82746 ASSAY OF FOLIC ACID SERUM: CPT

## 2025-08-04 PROCEDURE — 84100 ASSAY OF PHOSPHORUS: CPT

## 2025-08-04 PROCEDURE — 84443 ASSAY THYROID STIM HORMONE: CPT

## 2025-08-04 PROCEDURE — 83540 ASSAY OF IRON: CPT

## 2025-08-04 PROCEDURE — 82272 OCCULT BLD FECES 1-3 TESTS: CPT

## 2025-08-04 PROCEDURE — 87186 SC STD MICRODIL/AGAR DIL: CPT

## 2025-08-04 PROCEDURE — 84484 ASSAY OF TROPONIN QUANT: CPT

## 2025-08-04 PROCEDURE — 74019 RADEX ABDOMEN 2 VIEWS: CPT

## 2025-08-04 PROCEDURE — 70450 CT HEAD/BRAIN W/O DYE: CPT

## 2025-08-04 PROCEDURE — 99232 SBSQ HOSP IP/OBS MODERATE 35: CPT | Mod: GC

## 2025-08-04 PROCEDURE — 83605 ASSAY OF LACTIC ACID: CPT

## 2025-08-04 PROCEDURE — 83550 IRON BINDING TEST: CPT

## 2025-08-04 PROCEDURE — 36415 COLL VENOUS BLD VENIPUNCTURE: CPT

## 2025-08-04 PROCEDURE — 71045 X-RAY EXAM CHEST 1 VIEW: CPT

## 2025-08-04 PROCEDURE — 80048 BASIC METABOLIC PNL TOTAL CA: CPT

## 2025-08-04 PROCEDURE — 80061 LIPID PANEL: CPT

## 2025-08-04 PROCEDURE — 82607 VITAMIN B-12: CPT

## 2025-08-04 PROCEDURE — 84466 ASSAY OF TRANSFERRIN: CPT

## 2025-08-04 PROCEDURE — 86850 RBC ANTIBODY SCREEN: CPT

## 2025-08-04 PROCEDURE — 82803 BLOOD GASES ANY COMBINATION: CPT

## 2025-08-04 PROCEDURE — 86901 BLOOD TYPING SEROLOGIC RH(D): CPT

## 2025-08-04 RX ORDER — POLYETHYLENE GLYCOL-3350 AND ELECTROLYTES 236; 6.74; 5.86; 2.97; 22.74 G/274.31G; G/274.31G; G/274.31G; G/274.31G; G/274.31G
4000 POWDER, FOR SOLUTION ORAL ONCE
Refills: 0 | Status: COMPLETED | OUTPATIENT
Start: 2025-08-04 | End: 2025-08-04

## 2025-08-04 RX ADMIN — Medication 88 MICROGRAM(S): at 06:30

## 2025-08-04 RX ADMIN — Medication 40 MILLIGRAM(S): at 11:01

## 2025-08-04 RX ADMIN — Medication 40 MILLIGRAM(S): at 21:56

## 2025-08-04 RX ADMIN — ROSUVASTATIN CALCIUM 20 MILLIGRAM(S): 20 TABLET, FILM COATED ORAL at 21:56

## 2025-08-04 RX ADMIN — POLYETHYLENE GLYCOL-3350 AND ELECTROLYTES 4000 MILLILITER(S): 236; 6.74; 5.86; 2.97; 22.74 POWDER, FOR SOLUTION ORAL at 15:28

## 2025-08-05 LAB
ALBUMIN SERPL ELPH-MCNC: 3.6 G/DL — SIGNIFICANT CHANGE UP (ref 3.3–5)
ALP SERPL-CCNC: 38 U/L — LOW (ref 40–120)
ALT FLD-CCNC: 9 U/L — LOW (ref 10–45)
ANION GAP SERPL CALC-SCNC: 11 MMOL/L — SIGNIFICANT CHANGE UP (ref 5–17)
AST SERPL-CCNC: 13 U/L — SIGNIFICANT CHANGE UP (ref 10–40)
BASOPHILS # BLD AUTO: 0.03 K/UL — SIGNIFICANT CHANGE UP (ref 0–0.2)
BASOPHILS NFR BLD AUTO: 0.6 % — SIGNIFICANT CHANGE UP (ref 0–2)
BILIRUB SERPL-MCNC: 0.5 MG/DL — SIGNIFICANT CHANGE UP (ref 0.2–1.2)
BUN SERPL-MCNC: 8 MG/DL — SIGNIFICANT CHANGE UP (ref 7–23)
CALCIUM SERPL-MCNC: 9.3 MG/DL — SIGNIFICANT CHANGE UP (ref 8.4–10.5)
CHLORIDE SERPL-SCNC: 110 MMOL/L — HIGH (ref 96–108)
CO2 SERPL-SCNC: 21 MMOL/L — LOW (ref 22–31)
CREAT SERPL-MCNC: 0.47 MG/DL — LOW (ref 0.5–1.3)
EGFR: 93 ML/MIN/1.73M2 — SIGNIFICANT CHANGE UP
EGFR: 93 ML/MIN/1.73M2 — SIGNIFICANT CHANGE UP
EOSINOPHIL # BLD AUTO: 0.1 K/UL — SIGNIFICANT CHANGE UP (ref 0–0.5)
EOSINOPHIL NFR BLD AUTO: 2.1 % — SIGNIFICANT CHANGE UP (ref 0–6)
GLUCOSE SERPL-MCNC: 80 MG/DL — SIGNIFICANT CHANGE UP (ref 70–99)
HCT VFR BLD CALC: 35.1 % — SIGNIFICANT CHANGE UP (ref 34.5–45)
HGB BLD-MCNC: 10 G/DL — LOW (ref 11.5–15.5)
IMM GRANULOCYTES # BLD AUTO: 0.02 K/UL — SIGNIFICANT CHANGE UP (ref 0–0.07)
IMM GRANULOCYTES NFR BLD AUTO: 0.4 % — SIGNIFICANT CHANGE UP (ref 0–0.9)
LYMPHOCYTES # BLD AUTO: 0.97 K/UL — LOW (ref 1–3.3)
LYMPHOCYTES NFR BLD AUTO: 20.7 % — SIGNIFICANT CHANGE UP (ref 13–44)
MAGNESIUM SERPL-MCNC: 2.1 MG/DL — SIGNIFICANT CHANGE UP (ref 1.6–2.6)
MCHC RBC-ENTMCNC: 21.6 PG — LOW (ref 27–34)
MCHC RBC-ENTMCNC: 28.5 G/DL — LOW (ref 32–36)
MCV RBC AUTO: 76 FL — LOW (ref 80–100)
MONOCYTES # BLD AUTO: 0.71 K/UL — SIGNIFICANT CHANGE UP (ref 0–0.9)
MONOCYTES NFR BLD AUTO: 15.1 % — HIGH (ref 2–14)
NEUTROPHILS # BLD AUTO: 2.86 K/UL — SIGNIFICANT CHANGE UP (ref 1.8–7.4)
NEUTROPHILS NFR BLD AUTO: 61.1 % — SIGNIFICANT CHANGE UP (ref 43–77)
NRBC # BLD AUTO: 0 K/UL — SIGNIFICANT CHANGE UP (ref 0–0)
NRBC # FLD: 0 K/UL — SIGNIFICANT CHANGE UP (ref 0–0)
NRBC BLD AUTO-RTO: 0 /100 WBCS — SIGNIFICANT CHANGE UP (ref 0–0)
PHOSPHATE SERPL-MCNC: 3.3 MG/DL — SIGNIFICANT CHANGE UP (ref 2.5–4.5)
PLATELET # BLD AUTO: 246 K/UL — SIGNIFICANT CHANGE UP (ref 150–400)
PMV BLD: 9.3 FL — SIGNIFICANT CHANGE UP (ref 7–13)
POTASSIUM SERPL-MCNC: 3.7 MMOL/L — SIGNIFICANT CHANGE UP (ref 3.5–5.3)
POTASSIUM SERPL-SCNC: 3.7 MMOL/L — SIGNIFICANT CHANGE UP (ref 3.5–5.3)
PROT SERPL-MCNC: 5.9 G/DL — LOW (ref 6–8.3)
RBC # BLD: 4.62 M/UL — SIGNIFICANT CHANGE UP (ref 3.8–5.2)
RBC # FLD: 18.5 % — HIGH (ref 10.3–14.5)
SODIUM SERPL-SCNC: 142 MMOL/L — SIGNIFICANT CHANGE UP (ref 135–145)
WBC # BLD: 4.69 K/UL — SIGNIFICANT CHANGE UP (ref 3.8–10.5)
WBC # FLD AUTO: 4.69 K/UL — SIGNIFICANT CHANGE UP (ref 3.8–10.5)

## 2025-08-05 PROCEDURE — 99232 SBSQ HOSP IP/OBS MODERATE 35: CPT | Mod: GC

## 2025-08-05 RX ORDER — POLYETHYLENE GLYCOL-3350 AND ELECTROLYTES 236; 6.74; 5.86; 2.97; 22.74 G/274.31G; G/274.31G; G/274.31G; G/274.31G; G/274.31G
4000 POWDER, FOR SOLUTION ORAL ONCE
Refills: 0 | Status: COMPLETED | OUTPATIENT
Start: 2025-08-05 | End: 2025-08-05

## 2025-08-05 RX ADMIN — Medication 40 MILLIGRAM(S): at 21:12

## 2025-08-05 RX ADMIN — ROSUVASTATIN CALCIUM 20 MILLIGRAM(S): 20 TABLET, FILM COATED ORAL at 21:13

## 2025-08-05 RX ADMIN — Medication 40 MILLIGRAM(S): at 08:44

## 2025-08-05 RX ADMIN — Medication 88 MICROGRAM(S): at 06:29

## 2025-08-05 RX ADMIN — POLYETHYLENE GLYCOL-3350 AND ELECTROLYTES 4000 MILLILITER(S): 236; 6.74; 5.86; 2.97; 22.74 POWDER, FOR SOLUTION ORAL at 18:35

## 2025-08-06 ENCOUNTER — RESULT REVIEW (OUTPATIENT)
Age: 85
End: 2025-08-06

## 2025-08-06 ENCOUNTER — TRANSCRIPTION ENCOUNTER (OUTPATIENT)
Age: 85
End: 2025-08-06

## 2025-08-06 LAB
ALBUMIN SERPL ELPH-MCNC: 3.7 G/DL — SIGNIFICANT CHANGE UP (ref 3.3–5)
ALP SERPL-CCNC: 39 U/L — LOW (ref 40–120)
ALT FLD-CCNC: 7 U/L — LOW (ref 10–45)
ANION GAP SERPL CALC-SCNC: 12 MMOL/L — SIGNIFICANT CHANGE UP (ref 5–17)
APTT BLD: 32.3 SEC — SIGNIFICANT CHANGE UP (ref 26.1–36.8)
AST SERPL-CCNC: 10 U/L — SIGNIFICANT CHANGE UP (ref 10–40)
BASOPHILS # BLD AUTO: 0.03 K/UL — SIGNIFICANT CHANGE UP (ref 0–0.2)
BASOPHILS NFR BLD AUTO: 0.6 % — SIGNIFICANT CHANGE UP (ref 0–2)
BILIRUB SERPL-MCNC: 0.6 MG/DL — SIGNIFICANT CHANGE UP (ref 0.2–1.2)
BUN SERPL-MCNC: 6 MG/DL — LOW (ref 7–23)
CALCIUM SERPL-MCNC: 9.3 MG/DL — SIGNIFICANT CHANGE UP (ref 8.4–10.5)
CHLORIDE SERPL-SCNC: 109 MMOL/L — HIGH (ref 96–108)
CO2 SERPL-SCNC: 20 MMOL/L — LOW (ref 22–31)
CREAT SERPL-MCNC: 0.47 MG/DL — LOW (ref 0.5–1.3)
EGFR: 93 ML/MIN/1.73M2 — SIGNIFICANT CHANGE UP
EGFR: 93 ML/MIN/1.73M2 — SIGNIFICANT CHANGE UP
EOSINOPHIL # BLD AUTO: 0.09 K/UL — SIGNIFICANT CHANGE UP (ref 0–0.5)
EOSINOPHIL NFR BLD AUTO: 1.8 % — SIGNIFICANT CHANGE UP (ref 0–6)
GLUCOSE SERPL-MCNC: 84 MG/DL — SIGNIFICANT CHANGE UP (ref 70–99)
HCT VFR BLD CALC: 37 % — SIGNIFICANT CHANGE UP (ref 34.5–45)
HGB BLD-MCNC: 10.2 G/DL — LOW (ref 11.5–15.5)
IMM GRANULOCYTES # BLD AUTO: 0.01 K/UL — SIGNIFICANT CHANGE UP (ref 0–0.07)
IMM GRANULOCYTES NFR BLD AUTO: 0.2 % — SIGNIFICANT CHANGE UP (ref 0–0.9)
INR BLD: 1.09 — SIGNIFICANT CHANGE UP (ref 0.85–1.16)
LYMPHOCYTES # BLD AUTO: 0.9 K/UL — LOW (ref 1–3.3)
LYMPHOCYTES NFR BLD AUTO: 17.8 % — SIGNIFICANT CHANGE UP (ref 13–44)
MAGNESIUM SERPL-MCNC: 2 MG/DL — SIGNIFICANT CHANGE UP (ref 1.6–2.6)
MCHC RBC-ENTMCNC: 21.7 PG — LOW (ref 27–34)
MCHC RBC-ENTMCNC: 27.6 G/DL — LOW (ref 32–36)
MCV RBC AUTO: 78.9 FL — LOW (ref 80–100)
MONOCYTES # BLD AUTO: 0.62 K/UL — SIGNIFICANT CHANGE UP (ref 0–0.9)
MONOCYTES NFR BLD AUTO: 12.2 % — SIGNIFICANT CHANGE UP (ref 2–14)
NEUTROPHILS # BLD AUTO: 3.42 K/UL — SIGNIFICANT CHANGE UP (ref 1.8–7.4)
NEUTROPHILS NFR BLD AUTO: 67.4 % — SIGNIFICANT CHANGE UP (ref 43–77)
NRBC # BLD AUTO: 0 K/UL — SIGNIFICANT CHANGE UP (ref 0–0)
NRBC # FLD: 0 K/UL — SIGNIFICANT CHANGE UP (ref 0–0)
NRBC BLD AUTO-RTO: 0 /100 WBCS — SIGNIFICANT CHANGE UP (ref 0–0)
PHOSPHATE SERPL-MCNC: 3.5 MG/DL — SIGNIFICANT CHANGE UP (ref 2.5–4.5)
PLATELET # BLD AUTO: 231 K/UL — SIGNIFICANT CHANGE UP (ref 150–400)
PMV BLD: 9.7 FL — SIGNIFICANT CHANGE UP (ref 7–13)
POTASSIUM SERPL-MCNC: 3.7 MMOL/L — SIGNIFICANT CHANGE UP (ref 3.5–5.3)
POTASSIUM SERPL-SCNC: 3.7 MMOL/L — SIGNIFICANT CHANGE UP (ref 3.5–5.3)
PROT SERPL-MCNC: 5.9 G/DL — LOW (ref 6–8.3)
PROTHROM AB SERPL-ACNC: 12.5 SEC — SIGNIFICANT CHANGE UP (ref 9.9–13.4)
RBC # BLD: 4.69 M/UL — SIGNIFICANT CHANGE UP (ref 3.8–5.2)
RBC # FLD: 19 % — HIGH (ref 10.3–14.5)
SODIUM SERPL-SCNC: 141 MMOL/L — SIGNIFICANT CHANGE UP (ref 135–145)
WBC # BLD: 5.07 K/UL — SIGNIFICANT CHANGE UP (ref 3.8–10.5)
WBC # FLD AUTO: 5.07 K/UL — SIGNIFICANT CHANGE UP (ref 3.8–10.5)

## 2025-08-06 PROCEDURE — 45378 DIAGNOSTIC COLONOSCOPY: CPT | Mod: GC

## 2025-08-06 PROCEDURE — 80048 BASIC METABOLIC PNL TOTAL CA: CPT

## 2025-08-06 PROCEDURE — 80053 COMPREHEN METABOLIC PANEL: CPT

## 2025-08-06 PROCEDURE — 87077 CULTURE AEROBIC IDENTIFY: CPT

## 2025-08-06 PROCEDURE — 97161 PT EVAL LOW COMPLEX 20 MIN: CPT

## 2025-08-06 PROCEDURE — 36430 TRANSFUSION BLD/BLD COMPNT: CPT

## 2025-08-06 PROCEDURE — P9040: CPT

## 2025-08-06 PROCEDURE — 85610 PROTHROMBIN TIME: CPT

## 2025-08-06 PROCEDURE — 87086 URINE CULTURE/COLONY COUNT: CPT

## 2025-08-06 PROCEDURE — 97530 THERAPEUTIC ACTIVITIES: CPT

## 2025-08-06 PROCEDURE — 99232 SBSQ HOSP IP/OBS MODERATE 35: CPT | Mod: GC

## 2025-08-06 PROCEDURE — 71045 X-RAY EXAM CHEST 1 VIEW: CPT

## 2025-08-06 PROCEDURE — 43251 EGD REMOVE LESION SNARE: CPT | Mod: GC

## 2025-08-06 PROCEDURE — 81001 URINALYSIS AUTO W/SCOPE: CPT

## 2025-08-06 PROCEDURE — 84484 ASSAY OF TROPONIN QUANT: CPT

## 2025-08-06 PROCEDURE — 83540 ASSAY OF IRON: CPT

## 2025-08-06 PROCEDURE — 85730 THROMBOPLASTIN TIME PARTIAL: CPT

## 2025-08-06 PROCEDURE — 97116 GAIT TRAINING THERAPY: CPT

## 2025-08-06 PROCEDURE — 97165 OT EVAL LOW COMPLEX 30 MIN: CPT

## 2025-08-06 PROCEDURE — 86900 BLOOD TYPING SEROLOGIC ABO: CPT

## 2025-08-06 PROCEDURE — 87186 SC STD MICRODIL/AGAR DIL: CPT

## 2025-08-06 PROCEDURE — 82607 VITAMIN B-12: CPT

## 2025-08-06 PROCEDURE — 88305 TISSUE EXAM BY PATHOLOGIST: CPT | Mod: 26

## 2025-08-06 PROCEDURE — 82962 GLUCOSE BLOOD TEST: CPT

## 2025-08-06 PROCEDURE — 84443 ASSAY THYROID STIM HORMONE: CPT

## 2025-08-06 PROCEDURE — 82746 ASSAY OF FOLIC ACID SERUM: CPT

## 2025-08-06 PROCEDURE — 83550 IRON BINDING TEST: CPT

## 2025-08-06 PROCEDURE — 84466 ASSAY OF TRANSFERRIN: CPT

## 2025-08-06 PROCEDURE — 83735 ASSAY OF MAGNESIUM: CPT

## 2025-08-06 PROCEDURE — 86850 RBC ANTIBODY SCREEN: CPT

## 2025-08-06 PROCEDURE — 36415 COLL VENOUS BLD VENIPUNCTURE: CPT

## 2025-08-06 PROCEDURE — 85025 COMPLETE CBC W/AUTO DIFF WBC: CPT

## 2025-08-06 PROCEDURE — 82272 OCCULT BLD FECES 1-3 TESTS: CPT

## 2025-08-06 PROCEDURE — 74019 RADEX ABDOMEN 2 VIEWS: CPT

## 2025-08-06 PROCEDURE — 82728 ASSAY OF FERRITIN: CPT

## 2025-08-06 PROCEDURE — 86901 BLOOD TYPING SEROLOGIC RH(D): CPT

## 2025-08-06 PROCEDURE — 80061 LIPID PANEL: CPT

## 2025-08-06 PROCEDURE — 82803 BLOOD GASES ANY COMBINATION: CPT

## 2025-08-06 PROCEDURE — 85027 COMPLETE CBC AUTOMATED: CPT

## 2025-08-06 PROCEDURE — 70450 CT HEAD/BRAIN W/O DYE: CPT

## 2025-08-06 PROCEDURE — 83605 ASSAY OF LACTIC ACID: CPT

## 2025-08-06 PROCEDURE — 84100 ASSAY OF PHOSPHORUS: CPT

## 2025-08-06 PROCEDURE — 86923 COMPATIBILITY TEST ELECTRIC: CPT

## 2025-08-06 PROCEDURE — 83615 LACTATE (LD) (LDH) ENZYME: CPT

## 2025-08-06 DEVICE — CLIP RESOLUTION 360 235CM: Type: IMPLANTABLE DEVICE | Status: FUNCTIONAL

## 2025-08-06 RX ADMIN — ROSUVASTATIN CALCIUM 20 MILLIGRAM(S): 20 TABLET, FILM COATED ORAL at 21:41

## 2025-08-06 RX ADMIN — Medication 1 TABLET(S): at 11:48

## 2025-08-06 RX ADMIN — Medication 88 MICROGRAM(S): at 05:47

## 2025-08-06 RX ADMIN — POLYETHYLENE GLYCOL 3350 17 GRAM(S): 17 POWDER, FOR SOLUTION ORAL at 11:48

## 2025-08-06 RX ADMIN — Medication 40 MILLIGRAM(S): at 09:36

## 2025-08-07 ENCOUNTER — TRANSCRIPTION ENCOUNTER (OUTPATIENT)
Age: 85
End: 2025-08-07

## 2025-08-07 VITALS
SYSTOLIC BLOOD PRESSURE: 108 MMHG | DIASTOLIC BLOOD PRESSURE: 66 MMHG | RESPIRATION RATE: 17 BRPM | HEART RATE: 70 BPM | OXYGEN SATURATION: 93 % | TEMPERATURE: 98 F

## 2025-08-07 LAB
ALBUMIN SERPL ELPH-MCNC: 3.4 G/DL — SIGNIFICANT CHANGE UP (ref 3.3–5)
ALP SERPL-CCNC: 45 U/L — SIGNIFICANT CHANGE UP (ref 40–120)
ALT FLD-CCNC: 5 U/L — LOW (ref 10–45)
ANION GAP SERPL CALC-SCNC: 12 MMOL/L — SIGNIFICANT CHANGE UP (ref 5–17)
AST SERPL-CCNC: 11 U/L — SIGNIFICANT CHANGE UP (ref 10–40)
BILIRUB SERPL-MCNC: 0.4 MG/DL — SIGNIFICANT CHANGE UP (ref 0.2–1.2)
BUN SERPL-MCNC: 13 MG/DL — SIGNIFICANT CHANGE UP (ref 7–23)
CALCIUM SERPL-MCNC: 9.2 MG/DL — SIGNIFICANT CHANGE UP (ref 8.4–10.5)
CHLORIDE SERPL-SCNC: 105 MMOL/L — SIGNIFICANT CHANGE UP (ref 96–108)
CO2 SERPL-SCNC: 20 MMOL/L — LOW (ref 22–31)
CREAT SERPL-MCNC: 0.47 MG/DL — LOW (ref 0.5–1.3)
EGFR: 93 ML/MIN/1.73M2 — SIGNIFICANT CHANGE UP
EGFR: 93 ML/MIN/1.73M2 — SIGNIFICANT CHANGE UP
GLUCOSE SERPL-MCNC: 91 MG/DL — SIGNIFICANT CHANGE UP (ref 70–99)
HCT VFR BLD CALC: 35.2 % — SIGNIFICANT CHANGE UP (ref 34.5–45)
HGB BLD-MCNC: 9.9 G/DL — LOW (ref 11.5–15.5)
MCHC RBC-ENTMCNC: 22.1 PG — LOW (ref 27–34)
MCHC RBC-ENTMCNC: 28.1 G/DL — LOW (ref 32–36)
MCV RBC AUTO: 78.6 FL — LOW (ref 80–100)
NRBC # BLD AUTO: 0 K/UL — SIGNIFICANT CHANGE UP (ref 0–0)
NRBC # FLD: 0 K/UL — SIGNIFICANT CHANGE UP (ref 0–0)
NRBC BLD AUTO-RTO: 0 /100 WBCS — SIGNIFICANT CHANGE UP (ref 0–0)
PLATELET # BLD AUTO: 225 K/UL — SIGNIFICANT CHANGE UP (ref 150–400)
PMV BLD: 10.2 FL — SIGNIFICANT CHANGE UP (ref 7–13)
POTASSIUM SERPL-MCNC: 3.9 MMOL/L — SIGNIFICANT CHANGE UP (ref 3.5–5.3)
POTASSIUM SERPL-SCNC: 3.9 MMOL/L — SIGNIFICANT CHANGE UP (ref 3.5–5.3)
PROT SERPL-MCNC: 5.6 G/DL — LOW (ref 6–8.3)
RBC # BLD: 4.48 M/UL — SIGNIFICANT CHANGE UP (ref 3.8–5.2)
RBC # FLD: 19.4 % — HIGH (ref 10.3–14.5)
SODIUM SERPL-SCNC: 137 MMOL/L — SIGNIFICANT CHANGE UP (ref 135–145)
WBC # BLD: 8.17 K/UL — SIGNIFICANT CHANGE UP (ref 3.8–10.5)
WBC # FLD AUTO: 8.17 K/UL — SIGNIFICANT CHANGE UP (ref 3.8–10.5)

## 2025-08-07 PROCEDURE — 36430 TRANSFUSION BLD/BLD COMPNT: CPT

## 2025-08-07 PROCEDURE — 74019 RADEX ABDOMEN 2 VIEWS: CPT

## 2025-08-07 PROCEDURE — 97530 THERAPEUTIC ACTIVITIES: CPT

## 2025-08-07 PROCEDURE — 80061 LIPID PANEL: CPT

## 2025-08-07 PROCEDURE — 36415 COLL VENOUS BLD VENIPUNCTURE: CPT

## 2025-08-07 PROCEDURE — C1889: CPT

## 2025-08-07 PROCEDURE — 70450 CT HEAD/BRAIN W/O DYE: CPT

## 2025-08-07 PROCEDURE — P9040: CPT

## 2025-08-07 PROCEDURE — 97165 OT EVAL LOW COMPLEX 30 MIN: CPT

## 2025-08-07 PROCEDURE — 85027 COMPLETE CBC AUTOMATED: CPT

## 2025-08-07 PROCEDURE — 82272 OCCULT BLD FECES 1-3 TESTS: CPT

## 2025-08-07 PROCEDURE — 83550 IRON BINDING TEST: CPT

## 2025-08-07 PROCEDURE — 80053 COMPREHEN METABOLIC PANEL: CPT

## 2025-08-07 PROCEDURE — 87086 URINE CULTURE/COLONY COUNT: CPT

## 2025-08-07 PROCEDURE — 81001 URINALYSIS AUTO W/SCOPE: CPT

## 2025-08-07 PROCEDURE — 97116 GAIT TRAINING THERAPY: CPT

## 2025-08-07 PROCEDURE — 87077 CULTURE AEROBIC IDENTIFY: CPT

## 2025-08-07 PROCEDURE — 82746 ASSAY OF FOLIC ACID SERUM: CPT

## 2025-08-07 PROCEDURE — 85730 THROMBOPLASTIN TIME PARTIAL: CPT

## 2025-08-07 PROCEDURE — 85025 COMPLETE CBC W/AUTO DIFF WBC: CPT

## 2025-08-07 PROCEDURE — 83735 ASSAY OF MAGNESIUM: CPT

## 2025-08-07 PROCEDURE — 83540 ASSAY OF IRON: CPT

## 2025-08-07 PROCEDURE — 82728 ASSAY OF FERRITIN: CPT

## 2025-08-07 PROCEDURE — 82803 BLOOD GASES ANY COMBINATION: CPT

## 2025-08-07 PROCEDURE — 85610 PROTHROMBIN TIME: CPT

## 2025-08-07 PROCEDURE — 97161 PT EVAL LOW COMPLEX 20 MIN: CPT

## 2025-08-07 PROCEDURE — 87186 SC STD MICRODIL/AGAR DIL: CPT

## 2025-08-07 PROCEDURE — 86901 BLOOD TYPING SEROLOGIC RH(D): CPT

## 2025-08-07 PROCEDURE — 86850 RBC ANTIBODY SCREEN: CPT

## 2025-08-07 PROCEDURE — 84484 ASSAY OF TROPONIN QUANT: CPT

## 2025-08-07 PROCEDURE — 99285 EMERGENCY DEPT VISIT HI MDM: CPT

## 2025-08-07 PROCEDURE — 82607 VITAMIN B-12: CPT

## 2025-08-07 PROCEDURE — 83605 ASSAY OF LACTIC ACID: CPT

## 2025-08-07 PROCEDURE — 84100 ASSAY OF PHOSPHORUS: CPT

## 2025-08-07 PROCEDURE — 84466 ASSAY OF TRANSFERRIN: CPT

## 2025-08-07 PROCEDURE — 82962 GLUCOSE BLOOD TEST: CPT

## 2025-08-07 PROCEDURE — 99239 HOSP IP/OBS DSCHRG MGMT >30: CPT | Mod: GC

## 2025-08-07 PROCEDURE — 86923 COMPATIBILITY TEST ELECTRIC: CPT

## 2025-08-07 PROCEDURE — 86900 BLOOD TYPING SEROLOGIC ABO: CPT

## 2025-08-07 PROCEDURE — 83615 LACTATE (LD) (LDH) ENZYME: CPT

## 2025-08-07 PROCEDURE — 80048 BASIC METABOLIC PNL TOTAL CA: CPT

## 2025-08-07 PROCEDURE — 71045 X-RAY EXAM CHEST 1 VIEW: CPT

## 2025-08-07 PROCEDURE — 99232 SBSQ HOSP IP/OBS MODERATE 35: CPT | Mod: GC

## 2025-08-07 PROCEDURE — 84443 ASSAY THYROID STIM HORMONE: CPT

## 2025-08-07 PROCEDURE — 88305 TISSUE EXAM BY PATHOLOGIST: CPT

## 2025-08-07 RX ADMIN — Medication 88 MICROGRAM(S): at 05:31

## 2025-08-07 RX ADMIN — Medication 40 MILLIGRAM(S): at 05:32

## 2025-08-13 DIAGNOSIS — I10 ESSENTIAL (PRIMARY) HYPERTENSION: ICD-10-CM

## 2025-08-13 DIAGNOSIS — Z88.1 ALLERGY STATUS TO OTHER ANTIBIOTIC AGENTS: ICD-10-CM

## 2025-08-13 DIAGNOSIS — K22.2 ESOPHAGEAL OBSTRUCTION: ICD-10-CM

## 2025-08-13 DIAGNOSIS — D50.9 IRON DEFICIENCY ANEMIA, UNSPECIFIED: ICD-10-CM

## 2025-08-13 DIAGNOSIS — Z90.710 ACQUIRED ABSENCE OF BOTH CERVIX AND UTERUS: ICD-10-CM

## 2025-08-13 DIAGNOSIS — Z79.890 HORMONE REPLACEMENT THERAPY: ICD-10-CM

## 2025-08-13 DIAGNOSIS — E78.5 HYPERLIPIDEMIA, UNSPECIFIED: ICD-10-CM

## 2025-08-13 DIAGNOSIS — M84.68XA PATHOLOGICAL FRACTURE IN OTHER DISEASE, OTHER SITE, INITIAL ENCOUNTER FOR FRACTURE: ICD-10-CM

## 2025-08-13 DIAGNOSIS — R82.71 BACTERIURIA: ICD-10-CM
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
## 2025-08-13 DIAGNOSIS — R33.9 RETENTION OF URINE, UNSPECIFIED: ICD-10-CM
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
## 2025-08-13 DIAGNOSIS — Z79.82 LONG TERM (CURRENT) USE OF ASPIRIN: ICD-10-CM

## 2025-08-13 DIAGNOSIS — K31.819 ANGIODYSPLASIA OF STOMACH AND DUODENUM WITHOUT BLEEDING: ICD-10-CM

## 2025-08-13 DIAGNOSIS — E03.9 HYPOTHYROIDISM, UNSPECIFIED: ICD-10-CM

## 2025-08-13 DIAGNOSIS — Z88.6 ALLERGY STATUS TO ANALGESIC AGENT: ICD-10-CM

## 2025-08-13 DIAGNOSIS — K55.20 ANGIODYSPLASIA OF COLON WITHOUT HEMORRHAGE: ICD-10-CM

## 2025-08-13 DIAGNOSIS — I69.898 OTHER SEQUELAE OF OTHER CEREBROVASCULAR DISEASE: ICD-10-CM

## 2025-08-13 DIAGNOSIS — K57.30 DIVERTICULOSIS OF LARGE INTESTINE WITHOUT PERFORATION OR ABSCESS WITHOUT BLEEDING: ICD-10-CM

## 2025-08-13 DIAGNOSIS — K64.8 OTHER HEMORRHOIDS: ICD-10-CM

## 2025-08-13 DIAGNOSIS — K44.9 DIAPHRAGMATIC HERNIA WITHOUT OBSTRUCTION OR GANGRENE: ICD-10-CM

## 2025-08-13 DIAGNOSIS — K31.7 POLYP OF STOMACH AND DUODENUM: ICD-10-CM

## 2025-08-13 DIAGNOSIS — I69.342 MONOPLEGIA OF LOWER LIMB FOLLOWING CEREBRAL INFARCTION AFFECTING LEFT DOMINANT SIDE: ICD-10-CM

## (undated) DEVICE — PROBE FIAPC CIRC O.D. 2.3MM/6.9FR LNTH 220CM/7.2FT

## (undated) DEVICE — SNARE CAPTIVATOR II 10MM

## (undated) DEVICE — NET RETRIEVAL RESCUENET 30MM

## (undated) DEVICE — ELCTR GROUNDING PAD ADULT COVIDIEN